# Patient Record
Sex: MALE | Race: WHITE | Employment: OTHER | URBAN - METROPOLITAN AREA
[De-identification: names, ages, dates, MRNs, and addresses within clinical notes are randomized per-mention and may not be internally consistent; named-entity substitution may affect disease eponyms.]

---

## 2023-05-19 LAB
ALANINE AMINOTRANSFERASE (SGPT) (U/L) IN SER/PLAS: 25 U/L (ref 10–52)
ALBUMIN (G/DL) IN SER/PLAS: 4 G/DL (ref 3.4–5)
ALKALINE PHOSPHATASE (U/L) IN SER/PLAS: 71 U/L (ref 33–136)
ASPARTATE AMINOTRANSFERASE (SGOT) (U/L) IN SER/PLAS: 26 U/L (ref 9–39)
BASOPHILS (10*3/UL) IN BLOOD BY AUTOMATED COUNT: 0.05 X10E9/L (ref 0–0.1)
BASOPHILS/100 LEUKOCYTES IN BLOOD BY AUTOMATED COUNT: 0.8 % (ref 0–2)
BILIRUBIN DIRECT (MG/DL) IN SER/PLAS: 0.1 MG/DL (ref 0–0.3)
BILIRUBIN TOTAL (MG/DL) IN SER/PLAS: 0.5 MG/DL (ref 0–1.2)
C REACTIVE PROTEIN (MG/L) IN SER/PLAS: 0.16 MG/DL
CREATININE (MG/DL) IN SER/PLAS: 1.04 MG/DL (ref 0.5–1.3)
EOSINOPHILS (10*3/UL) IN BLOOD BY AUTOMATED COUNT: 0.2 X10E9/L (ref 0–0.4)
EOSINOPHILS/100 LEUKOCYTES IN BLOOD BY AUTOMATED COUNT: 3.3 % (ref 0–6)
ERYTHROCYTE DISTRIBUTION WIDTH (RATIO) BY AUTOMATED COUNT: 15.1 % (ref 11.5–14.5)
ERYTHROCYTE MEAN CORPUSCULAR HEMOGLOBIN CONCENTRATION (G/DL) BY AUTOMATED: 31.6 G/DL (ref 32–36)
ERYTHROCYTE MEAN CORPUSCULAR VOLUME (FL) BY AUTOMATED COUNT: 100 FL (ref 80–100)
ERYTHROCYTES (10*6/UL) IN BLOOD BY AUTOMATED COUNT: 3.93 X10E12/L (ref 4.5–5.9)
GFR MALE: 74 ML/MIN/1.73M2
HEMATOCRIT (%) IN BLOOD BY AUTOMATED COUNT: 39.2 % (ref 41–52)
HEMOGLOBIN (G/DL) IN BLOOD: 12.4 G/DL (ref 13.5–17.5)
IMMATURE GRANULOCYTES/100 LEUKOCYTES IN BLOOD BY AUTOMATED COUNT: 0.3 % (ref 0–0.9)
LEUKOCYTES (10*3/UL) IN BLOOD BY AUTOMATED COUNT: 6.1 X10E9/L (ref 4.4–11.3)
LYMPHOCYTES (10*3/UL) IN BLOOD BY AUTOMATED COUNT: 1.15 X10E9/L (ref 0.8–3)
LYMPHOCYTES/100 LEUKOCYTES IN BLOOD BY AUTOMATED COUNT: 18.9 % (ref 13–44)
MONOCYTES (10*3/UL) IN BLOOD BY AUTOMATED COUNT: 0.47 X10E9/L (ref 0.05–0.8)
MONOCYTES/100 LEUKOCYTES IN BLOOD BY AUTOMATED COUNT: 7.7 % (ref 2–10)
NEUTROPHILS (10*3/UL) IN BLOOD BY AUTOMATED COUNT: 4.21 X10E9/L (ref 1.6–5.5)
NEUTROPHILS/100 LEUKOCYTES IN BLOOD BY AUTOMATED COUNT: 69 % (ref 40–80)
NRBC (PER 100 WBCS) BY AUTOMATED COUNT: 0 /100 WBC (ref 0–0)
PLATELETS (10*3/UL) IN BLOOD AUTOMATED COUNT: 159 X10E9/L (ref 150–450)
PROTEIN TOTAL: 6.6 G/DL (ref 6.4–8.2)

## 2023-08-18 LAB
ALANINE AMINOTRANSFERASE (SGPT) (U/L) IN SER/PLAS: 34 U/L (ref 10–52)
ALBUMIN (G/DL) IN SER/PLAS: 4.1 G/DL (ref 3.4–5)
ALKALINE PHOSPHATASE (U/L) IN SER/PLAS: 91 U/L (ref 33–136)
ASPARTATE AMINOTRANSFERASE (SGOT) (U/L) IN SER/PLAS: 43 U/L (ref 9–39)
BASOPHILS (10*3/UL) IN BLOOD BY AUTOMATED COUNT: 0.06 X10E9/L (ref 0–0.1)
BASOPHILS/100 LEUKOCYTES IN BLOOD BY AUTOMATED COUNT: 0.9 % (ref 0–2)
BILIRUBIN DIRECT (MG/DL) IN SER/PLAS: 0.1 MG/DL (ref 0–0.3)
BILIRUBIN TOTAL (MG/DL) IN SER/PLAS: 0.5 MG/DL (ref 0–1.2)
C REACTIVE PROTEIN (MG/L) IN SER/PLAS: 0.54 MG/DL
CREATININE (MG/DL) IN SER/PLAS: 1.06 MG/DL (ref 0.5–1.3)
EOSINOPHILS (10*3/UL) IN BLOOD BY AUTOMATED COUNT: 0.32 X10E9/L (ref 0–0.4)
EOSINOPHILS/100 LEUKOCYTES IN BLOOD BY AUTOMATED COUNT: 5 % (ref 0–6)
ERYTHROCYTE DISTRIBUTION WIDTH (RATIO) BY AUTOMATED COUNT: 14.6 % (ref 11.5–14.5)
ERYTHROCYTE MEAN CORPUSCULAR HEMOGLOBIN CONCENTRATION (G/DL) BY AUTOMATED: 32.8 G/DL (ref 32–36)
ERYTHROCYTE MEAN CORPUSCULAR VOLUME (FL) BY AUTOMATED COUNT: 101 FL (ref 80–100)
ERYTHROCYTES (10*6/UL) IN BLOOD BY AUTOMATED COUNT: 3.93 X10E12/L (ref 4.5–5.9)
GFR MALE: 72 ML/MIN/1.73M2
HEMATOCRIT (%) IN BLOOD BY AUTOMATED COUNT: 39.6 % (ref 41–52)
HEMOGLOBIN (G/DL) IN BLOOD: 13 G/DL (ref 13.5–17.5)
IMMATURE GRANULOCYTES/100 LEUKOCYTES IN BLOOD BY AUTOMATED COUNT: 0.3 % (ref 0–0.9)
LEUKOCYTES (10*3/UL) IN BLOOD BY AUTOMATED COUNT: 6.4 X10E9/L (ref 4.4–11.3)
LYMPHOCYTES (10*3/UL) IN BLOOD BY AUTOMATED COUNT: 1.18 X10E9/L (ref 0.8–3)
LYMPHOCYTES/100 LEUKOCYTES IN BLOOD BY AUTOMATED COUNT: 18.4 % (ref 13–44)
MONOCYTES (10*3/UL) IN BLOOD BY AUTOMATED COUNT: 0.77 X10E9/L (ref 0.05–0.8)
MONOCYTES/100 LEUKOCYTES IN BLOOD BY AUTOMATED COUNT: 12 % (ref 2–10)
NEUTROPHILS (10*3/UL) IN BLOOD BY AUTOMATED COUNT: 4.06 X10E9/L (ref 1.6–5.5)
NEUTROPHILS/100 LEUKOCYTES IN BLOOD BY AUTOMATED COUNT: 63.4 % (ref 40–80)
NRBC (PER 100 WBCS) BY AUTOMATED COUNT: 0 /100 WBC (ref 0–0)
PLATELETS (10*3/UL) IN BLOOD AUTOMATED COUNT: 146 X10E9/L (ref 150–450)
PROTEIN TOTAL: 6.5 G/DL (ref 6.4–8.2)

## 2023-08-31 ENCOUNTER — HOSPITAL ENCOUNTER (OUTPATIENT)
Dept: DATA CONVERSION | Facility: HOSPITAL | Age: 78
Discharge: HOME | End: 2023-08-31
Payer: MEDICARE

## 2023-08-31 DIAGNOSIS — E75.5 OTHER LIPID STORAGE DISORDERS: ICD-10-CM

## 2023-08-31 DIAGNOSIS — I10 ESSENTIAL (PRIMARY) HYPERTENSION: ICD-10-CM

## 2023-08-31 DIAGNOSIS — R73.09 OTHER ABNORMAL GLUCOSE: ICD-10-CM

## 2023-08-31 LAB
ALBUMIN SERPL-MCNC: 4 GM/DL (ref 3.5–5)
ALBUMIN/GLOB SERPL: 1.8 RATIO (ref 1.5–3)
ALP BLD-CCNC: 101 U/L (ref 35–125)
ALT SERPL-CCNC: 35 U/L (ref 5–40)
ANION GAP SERPL CALCULATED.3IONS-SCNC: 12 MMOL/L (ref 0–19)
AST SERPL-CCNC: 61 U/L (ref 5–40)
BASOPHILS # BLD AUTO: 0.06 K/UL (ref 0–0.22)
BASOPHILS NFR BLD AUTO: 1.1 % (ref 0–1)
BILIRUB SERPL-MCNC: 0.3 MG/DL (ref 0.1–1.2)
BUN SERPL-MCNC: 14 MG/DL (ref 8–25)
BUN/CREAT SERPL: 12.7 RATIO (ref 8–21)
CALCIUM SERPL-MCNC: 8.8 MG/DL (ref 8.5–10.4)
CHLORIDE SERPL-SCNC: 106 MMOL/L (ref 97–107)
CO2 SERPL-SCNC: 23 MMOL/L (ref 24–31)
CREAT SERPL-MCNC: 1.1 MG/DL (ref 0.4–1.6)
DEPRECATED RDW RBC AUTO: 52.4 FL (ref 37–54)
DIFFERENTIAL METHOD BLD: ABNORMAL
EOSINOPHIL # BLD AUTO: 0.67 K/UL (ref 0–0.45)
EOSINOPHIL NFR BLD: 12.2 % (ref 0–3)
ERYTHROCYTE [DISTWIDTH] IN BLOOD BY AUTOMATED COUNT: 14.5 % (ref 11.7–15)
GFR SERPL CREATININE-BSD FRML MDRD: 69 ML/MIN/1.73 M2
GLOBULIN SER-MCNC: 2.2 G/DL (ref 1.9–3.7)
GLUCOSE SERPL-MCNC: 106 MG/DL (ref 65–99)
HCT VFR BLD AUTO: 38.7 % (ref 41–50)
HGB BLD-MCNC: 12.6 GM/DL (ref 13.5–16.5)
IMM GRANULOCYTES # BLD AUTO: 0.02 K/UL (ref 0–0.1)
LYMPHOCYTES # BLD AUTO: 1.37 K/UL (ref 1.2–3.2)
LYMPHOCYTES NFR BLD MANUAL: 24.9 % (ref 20–40)
MCH RBC QN AUTO: 32.1 PG (ref 26–34)
MCHC RBC AUTO-ENTMCNC: 32.6 % (ref 31–37)
MCV RBC AUTO: 98.5 FL (ref 80–100)
MONOCYTES # BLD AUTO: 0.69 K/UL (ref 0–0.8)
MONOCYTES NFR BLD MANUAL: 12.5 % (ref 0–8)
NEUTROPHILS # BLD AUTO: 2.69 K/UL
NEUTROPHILS # BLD AUTO: 2.69 K/UL (ref 1.8–7.7)
NEUTROPHILS.IMMATURE NFR BLD: 0.4 % (ref 0–1)
NEUTS SEG NFR BLD: 48.9 % (ref 50–70)
NRBC BLD-RTO: 0 /100 WBC
PLATELET # BLD AUTO: 146 K/UL (ref 150–450)
PMV BLD AUTO: 9.9 CU (ref 7–12.6)
POTASSIUM SERPL-SCNC: 4.7 MMOL/L (ref 3.4–5.1)
PROT SERPL-MCNC: 6.2 G/DL (ref 5.9–7.9)
RBC # BLD AUTO: 3.93 M/UL (ref 4.5–5.5)
SODIUM SERPL-SCNC: 141 MMOL/L (ref 133–145)
WBC # BLD AUTO: 5.5 K/UL (ref 4.5–11)

## 2023-09-15 PROBLEM — I10 HYPERTENSION: Status: ACTIVE | Noted: 2023-09-15

## 2023-09-15 PROBLEM — K52.9 COLITIS: Status: ACTIVE | Noted: 2023-09-15

## 2023-09-15 PROBLEM — R21 RASH: Status: ACTIVE | Noted: 2023-09-15

## 2023-09-15 PROBLEM — R73.03 PREDIABETES: Status: ACTIVE | Noted: 2023-09-15

## 2023-09-15 PROBLEM — R79.9 ABNORMAL BLOOD CHEMISTRY: Status: ACTIVE | Noted: 2023-09-15

## 2023-09-15 PROBLEM — Q76.6 ABNORMALITY OF RIB DETERMINED BY X-RAY: Status: ACTIVE | Noted: 2023-09-15

## 2023-09-15 PROBLEM — R04.0 EPISTAXIS: Status: ACTIVE | Noted: 2023-09-15

## 2023-09-15 PROBLEM — N52.9 ERECTILE DYSFUNCTION: Status: ACTIVE | Noted: 2023-09-15

## 2023-09-15 PROBLEM — K21.9 GASTROESOPHAGEAL REFLUX DISEASE: Status: ACTIVE | Noted: 2023-09-15

## 2023-09-15 PROBLEM — M19.90 OSTEOARTHRITIS: Status: ACTIVE | Noted: 2023-09-15

## 2023-09-15 PROBLEM — E78.5 HYPERLIPIDEMIA: Status: ACTIVE | Noted: 2023-09-15

## 2023-09-15 PROBLEM — M06.9 RHEUMATOID ARTHRITIS (MULTI): Status: ACTIVE | Noted: 2023-09-15

## 2023-09-15 PROBLEM — M13.80 SERONEGATIVE ARTHRITIS: Status: ACTIVE | Noted: 2023-09-15

## 2023-09-15 PROBLEM — M81.0 OSTEOPOROSIS: Status: ACTIVE | Noted: 2023-09-15

## 2023-09-15 PROBLEM — K13.0 LIP ULCERATION: Status: ACTIVE | Noted: 2023-09-15

## 2023-09-15 RX ORDER — PREDNISONE 1 MG/1
TABLET ORAL
COMMUNITY
Start: 2023-05-19 | End: 2023-10-23 | Stop reason: ALTCHOICE

## 2023-09-15 RX ORDER — OMEPRAZOLE 10 MG/1
1 CAPSULE, DELAYED RELEASE ORAL DAILY
COMMUNITY
End: 2023-10-23 | Stop reason: ALTCHOICE

## 2023-09-15 RX ORDER — PREDNISONE 5 MG/1
1 TABLET ORAL DAILY
COMMUNITY
Start: 2022-09-19 | End: 2023-10-23 | Stop reason: ALTCHOICE

## 2023-09-15 RX ORDER — CHOLECALCIFEROL (VITAMIN D3) 125 MCG
1 CAPSULE ORAL DAILY
COMMUNITY
Start: 2019-10-07

## 2023-09-15 RX ORDER — UBIDECARENONE 30 MG
1 CAPSULE ORAL DAILY
COMMUNITY
End: 2024-04-22 | Stop reason: WASHOUT

## 2023-09-15 RX ORDER — ACETAMINOPHEN, ASPIRIN (NSAID), AND CAFFEINE 250; 250; 65 MG/1; MG/1; MG/1
2 TABLET, FILM COATED ORAL DAILY
COMMUNITY
End: 2024-05-30 | Stop reason: HOSPADM

## 2023-09-15 RX ORDER — LEFLUNOMIDE 20 MG/1
1 TABLET ORAL DAILY
COMMUNITY
End: 2024-04-02 | Stop reason: SDUPTHER

## 2023-09-15 RX ORDER — ACETAMINOPHEN 500 MG
1 TABLET ORAL DAILY
COMMUNITY
End: 2023-10-23 | Stop reason: ALTCHOICE

## 2023-09-15 RX ORDER — PREDNISONE 10 MG/1
10 TABLET ORAL EVERY MORNING
COMMUNITY
Start: 2023-04-10 | End: 2023-10-23 | Stop reason: ALTCHOICE

## 2023-09-15 RX ORDER — NABUMETONE 750 MG/1
1 TABLET, FILM COATED ORAL 2 TIMES DAILY
COMMUNITY
End: 2023-10-23 | Stop reason: ALTCHOICE

## 2023-09-15 RX ORDER — OMEPRAZOLE 20 MG/1
1 CAPSULE, DELAYED RELEASE ORAL DAILY
COMMUNITY
Start: 2019-10-07 | End: 2024-05-30 | Stop reason: HOSPADM

## 2023-09-15 RX ORDER — IBANDRONATE SODIUM 150 MG/1
1 TABLET, FILM COATED ORAL
COMMUNITY
End: 2023-12-04 | Stop reason: SDUPTHER

## 2023-09-15 RX ORDER — LISINOPRIL 10 MG/1
1 TABLET ORAL EVERY MORNING
COMMUNITY
Start: 2019-10-07 | End: 2023-10-10

## 2023-09-15 RX ORDER — LANOLIN ALCOHOL/MO/W.PET/CERES
1 CREAM (GRAM) TOPICAL DAILY
COMMUNITY
Start: 2019-10-07 | End: 2023-12-04 | Stop reason: WASHOUT

## 2023-10-09 DIAGNOSIS — I10 PRIMARY HYPERTENSION: Primary | ICD-10-CM

## 2023-10-10 RX ORDER — LISINOPRIL 10 MG/1
10 TABLET ORAL DAILY
Qty: 90 TABLET | Refills: 1 | Status: SHIPPED | OUTPATIENT
Start: 2023-10-10 | End: 2024-04-04

## 2023-10-23 ENCOUNTER — OFFICE VISIT (OUTPATIENT)
Dept: OTOLARYNGOLOGY | Facility: CLINIC | Age: 78
End: 2023-10-23
Payer: MEDICARE

## 2023-10-23 VITALS — WEIGHT: 166 LBS | BODY MASS INDEX: 24.59 KG/M2 | TEMPERATURE: 97.5 F | HEIGHT: 69 IN

## 2023-10-23 DIAGNOSIS — J34.3 HYPERTROPHY OF INFERIOR NASAL TURBINATE: ICD-10-CM

## 2023-10-23 DIAGNOSIS — J31.0 CHRONIC RHINITIS: Primary | ICD-10-CM

## 2023-10-23 DIAGNOSIS — J34.2 DEVIATED NASAL SEPTUM: ICD-10-CM

## 2023-10-23 PROCEDURE — 1160F RVW MEDS BY RX/DR IN RCRD: CPT | Performed by: OTOLARYNGOLOGY

## 2023-10-23 PROCEDURE — 99213 OFFICE O/P EST LOW 20 MIN: CPT | Performed by: OTOLARYNGOLOGY

## 2023-10-23 PROCEDURE — 1036F TOBACCO NON-USER: CPT | Performed by: OTOLARYNGOLOGY

## 2023-10-23 PROCEDURE — 1159F MED LIST DOCD IN RCRD: CPT | Performed by: OTOLARYNGOLOGY

## 2023-10-23 RX ORDER — AZELASTINE HCL 205.5 UG/1
SPRAY NASAL
Qty: 30 ML | Refills: 11 | Status: SHIPPED | OUTPATIENT
Start: 2023-10-23 | End: 2023-12-04 | Stop reason: WASHOUT

## 2023-10-23 RX ORDER — FLUTICASONE PROPIONATE 50 MCG
SPRAY, SUSPENSION (ML) NASAL
Qty: 16 G | Refills: 11 | Status: SHIPPED | OUTPATIENT
Start: 2023-10-23 | End: 2023-12-04 | Stop reason: WASHOUT

## 2023-10-23 NOTE — PROGRESS NOTES
HPI  Bret Rowland is a 78 y.o. male with long history of bilateral nasal obstruction, fluctuates.  Often better with Afrin but he uses very rarely.  He was told that he would benefit from septoplasty years ago but never pursued.  He is not on any prescription nasal spray.  Does not do much noise nasal infection.      Past Medical History:   Diagnosis Date    Personal history of other diseases of the digestive system     History of gastroesophageal reflux (GERD)    Personal history of other diseases of the musculoskeletal system and connective tissue     History of arthritis            Medications:     Current Outpatient Medications:     aspirin-acetaminophen-caffeine (Excedrin Extra Strength) 250-250-65 mg tablet, Take 2 tablets by mouth once daily., Disp: , Rfl:     cholecalciferol (Vitamin D-3) 125 MCG (5000 UT) capsule, Take 1 capsule (5,000 Units) by mouth once daily., Disp: , Rfl:     ibandronate (Boniva) 150 mg tablet, Take 1 tablet (150 mg) by mouth every 30 (thirty) days., Disp: , Rfl:     leflunomide (Arava) 20 mg tablet, Take 1 tablet (20 mg) by mouth once daily., Disp: , Rfl:     lisinopril 10 mg tablet, Take 1 tablet by mouth once daily, Disp: 90 tablet, Rfl: 1    magnesium oxide (Mag-Ox) 400 mg (241.3 mg magnesium) tablet, Take 1 tablet (400 mg) by mouth once daily., Disp: , Rfl:     multivit-min-FA-lycopen-lutein (ESSENTIAL Man) 0.4-2-250 mg-mg-mcg tablet, Take 1 tablet by mouth once daily., Disp: , Rfl:     NON FORMULARY, Prevagen CAPS  Refills: 0     Active, Disp: , Rfl:     omeprazole (PriLOSEC) 20 mg DR capsule, Take 1 capsule (20 mg) by mouth once daily. At the same time, Disp: , Rfl:      Allergies:  Allergies   Allergen Reactions    Amoxicillin-Pot Clavulanate Unknown    Erythromycin Unknown    Hydroxychloroquine Hives    Levofloxacin Hives    Nsaids (Non-Steroidal Anti-Inflammatory Drug) Unknown    Sulfa (Sulfonamide Antibiotics) Unknown        Physical Exam:  Last Recorded  "Vitals  Temperature 36.4 °C (97.5 °F), height 1.753 m (5' 9\"), weight 75.3 kg (166 lb).  General:     General appearance: Well-developed, well-nourished in no acute distress.       Voice:  normal       Head/face: Normal appearance; nontender to palpation     Facial nerve function: Normal and symmetric bilaterally.    Oral/oropharynx:     Oral vestibule: Normal labial and gingival mucosa     Tongue/floor of mouth: Normal without lesion     Oropharynx: Clear.  No lesions present of the hard/soft palate, posterior pharynx    Neck:     Neck: Normal appearance, trachea midline     Salivary glands: Normal to palpation bilaterally     Lymph nodes: No cervical lymphadenopathy to palpation     Thyroid: No thyromegaly.  No palpable nodules     Range of motion: Normal    Neurological:     Cortical functions: Alert and oriented x3, appropriate affect       Larynx/hypopharynx:     Laryngeal findings: Mirror exam inadequate or limited secondary to enlarged base of tongue and/or excessive gagging    Ear:     Ear canal: Normal bilaterally     Tympanic membrane: Intact and mobile bilaterally     Pinna: Normal bilaterally     Hearing:  Gross hearing assessment normal by voice    Nose:     Visualized using: Anterior rhinoscopy     Nasopharynx: Inadequate mirror exam secondary to gag, anatomy.       Nasal dorsum: Nontraumatic midline appearance     Septum: Deviation     inferior turbinates: Congestion bilaterally     Mucosa: Bilateral, pink, normal appearing       Assessment/Plan   He has obvious nasal septal deviation and bilateral inferior turbinate hypertrophy.  Recommend azelastine and fluticasone.  Follow-up in a month.  We discussed consideration of procedures including office-based turbinate surgeries which may be of some benefit here.  We will see how he responds to the prescribed therapy         Justino Dykes MD    "

## 2023-11-17 ENCOUNTER — APPOINTMENT (OUTPATIENT)
Dept: RHEUMATOLOGY | Facility: CLINIC | Age: 78
End: 2023-11-17
Payer: MEDICARE

## 2023-11-20 ENCOUNTER — OFFICE VISIT (OUTPATIENT)
Dept: OTOLARYNGOLOGY | Facility: CLINIC | Age: 78
End: 2023-11-20
Payer: MEDICARE

## 2023-11-20 VITALS — HEIGHT: 69 IN | BODY MASS INDEX: 24.44 KG/M2 | WEIGHT: 165 LBS

## 2023-11-20 DIAGNOSIS — J34.3 HYPERTROPHY OF INFERIOR NASAL TURBINATE: ICD-10-CM

## 2023-11-20 DIAGNOSIS — J34.2 DEVIATED NASAL SEPTUM: Primary | ICD-10-CM

## 2023-11-20 PROCEDURE — 1159F MED LIST DOCD IN RCRD: CPT | Performed by: OTOLARYNGOLOGY

## 2023-11-20 PROCEDURE — 1160F RVW MEDS BY RX/DR IN RCRD: CPT | Performed by: OTOLARYNGOLOGY

## 2023-11-20 PROCEDURE — 99213 OFFICE O/P EST LOW 20 MIN: CPT | Performed by: OTOLARYNGOLOGY

## 2023-11-20 PROCEDURE — 1036F TOBACCO NON-USER: CPT | Performed by: OTOLARYNGOLOGY

## 2023-11-20 NOTE — PROGRESS NOTES
HPI  Bret Rowland is a 78 y.o. male follow-up trial of nasal sprays.  He did not get much relief from his nasal congestion with the nasal sprays.  He continues to be bothered especially at night.      Prior history: long history of bilateral nasal obstruction, fluctuates.  Often better with Afrin but he uses very rarely.  He was told that he would benefit from septoplasty years ago but never pursued.  He is not on any prescription nasal spray.  Does not do much noise nasal infection.      Past Medical History:   Diagnosis Date    Personal history of other diseases of the digestive system     History of gastroesophageal reflux (GERD)    Personal history of other diseases of the musculoskeletal system and connective tissue     History of arthritis            Medications:     Current Outpatient Medications:     aspirin-acetaminophen-caffeine (Excedrin Extra Strength) 250-250-65 mg tablet, Take 2 tablets by mouth once daily., Disp: , Rfl:     azelastine 205.5 mcg (0.15 %) spray,non-aerosol, Administer 2 sprays to each nostril twice daily, Disp: 30 mL, Rfl: 11    cholecalciferol (Vitamin D-3) 125 MCG (5000 UT) capsule, Take 1 capsule (125 mcg) by mouth once daily., Disp: , Rfl:     fluticasone (Flonase) 50 mcg/actuation nasal spray, Administer 2 pumps to each nostril once daily, Disp: 16 g, Rfl: 11    ibandronate (Boniva) 150 mg tablet, Take 1 tablet (150 mg) by mouth every 30 (thirty) days., Disp: , Rfl:     leflunomide (Arava) 20 mg tablet, Take 1 tablet (20 mg) by mouth once daily., Disp: , Rfl:     lisinopril 10 mg tablet, Take 1 tablet by mouth once daily, Disp: 90 tablet, Rfl: 1    magnesium oxide (Mag-Ox) 400 mg (241.3 mg magnesium) tablet, Take 1 tablet (400 mg) by mouth once daily., Disp: , Rfl:     multivit-min-FA-lycopen-lutein (ESSENTIAL Man) 0.4-2-250 mg-mg-mcg tablet, Take 1 tablet by mouth once daily., Disp: , Rfl:     NON FORMULARY, Prevagen CAPS  Refills: 0     Active, Disp: , Rfl:     omeprazole  (PriLOSEC) 20 mg DR capsule, Take 1 capsule (20 mg) by mouth once daily. At the same time, Disp: , Rfl:      Allergies:  Allergies   Allergen Reactions    Amoxicillin-Pot Clavulanate Unknown    Erythromycin Unknown    Hydroxychloroquine Hives    Levofloxacin Hives    Nsaids (Non-Steroidal Anti-Inflammatory Drug) Unknown    Sulfa (Sulfonamide Antibiotics) Unknown        Physical Exam:  Last Recorded Vitals  There were no vitals taken for this visit.  General:     General appearance: Well-developed, well-nourished in no acute distress.       Voice:  normal       Head/face: Normal appearance; nontender to palpation     Facial nerve function: Normal and symmetric bilaterally.    Oral/oropharynx:     Oral vestibule: Normal labial and gingival mucosa     Tongue/floor of mouth: Normal without lesion     Oropharynx: Clear.  No lesions present of the hard/soft palate, posterior pharynx    Neck:     Neck: Normal appearance, trachea midline     Salivary glands: Normal to palpation bilaterally     Lymph nodes: No cervical lymphadenopathy to palpation     Thyroid: No thyromegaly.  No palpable nodules     Range of motion: Normal    Neurological:     Cortical functions: Alert and oriented x3, appropriate affect       Larynx/hypopharynx:     Laryngeal findings: Mirror exam inadequate or limited secondary to enlarged base of tongue and/or excessive gagging    Ear:     Ear canal: Normal bilaterally     Tympanic membrane: Intact and mobile bilaterally     Pinna: Normal bilaterally     Hearing:  Gross hearing assessment normal by voice    Nose:     Visualized using: Anterior rhinoscopy     Nasopharynx: Inadequate mirror exam secondary to gag, anatomy.       Nasal dorsum: Nontraumatic midline appearance     Septum: Deviation     inferior turbinates: Congestion bilaterally     Mucosa: Bilateral, pink, normal appearing       Assessment/Plan   He has obvious nasal septal deviation and bilateral inferior turbinate hypertrophy.  He has not  responded well to medical therapy.  We discussed procedural options including office-based turbinate hypertrophy versus septoplasty and bilateral inferior turbinate reduction under general anesthesia.  He is very vital and I think he would get more benefit from addressing the considerable nasal septal deviation and I have recommended septoplasty and bilateral inferior turbinate reduction and outfracture.  The risks, goals, and alternatives were discussed in detail.  Specifically, the risks including, but not limited to, general anesthesia, bleeding, infection, nasal septal perforation, numbness, alteration of sense of smell, potential need for revision surgery or inadequate resolution of symptoms and CSF leak were discussed in detail.  The patient has indicated informed consent would like to schedule.         Justino Dykes MD

## 2023-12-04 ENCOUNTER — LAB (OUTPATIENT)
Dept: LAB | Facility: LAB | Age: 78
End: 2023-12-04
Payer: MEDICARE

## 2023-12-04 ENCOUNTER — OFFICE VISIT (OUTPATIENT)
Dept: RHEUMATOLOGY | Facility: CLINIC | Age: 78
End: 2023-12-04
Payer: MEDICARE

## 2023-12-04 ENCOUNTER — APPOINTMENT (OUTPATIENT)
Dept: RHEUMATOLOGY | Facility: CLINIC | Age: 78
End: 2023-12-04
Payer: MEDICARE

## 2023-12-04 VITALS
DIASTOLIC BLOOD PRESSURE: 64 MMHG | HEIGHT: 69 IN | BODY MASS INDEX: 24.73 KG/M2 | SYSTOLIC BLOOD PRESSURE: 133 MMHG | WEIGHT: 167 LBS | HEART RATE: 65 BPM

## 2023-12-04 DIAGNOSIS — Z79.899 ENCOUNTER FOR LONG-TERM (CURRENT) USE OF MEDICATIONS: ICD-10-CM

## 2023-12-04 DIAGNOSIS — M81.0 OSTEOPOROSIS, UNSPECIFIED OSTEOPOROSIS TYPE, UNSPECIFIED PATHOLOGICAL FRACTURE PRESENCE: ICD-10-CM

## 2023-12-04 DIAGNOSIS — M13.80 SERONEGATIVE ARTHRITIS: Primary | ICD-10-CM

## 2023-12-04 DIAGNOSIS — M13.80 SERONEGATIVE ARTHRITIS: ICD-10-CM

## 2023-12-04 LAB
25(OH)D3 SERPL-MCNC: 84 NG/ML (ref 30–100)
ALBUMIN SERPL BCP-MCNC: 3.9 G/DL (ref 3.4–5)
ALP SERPL-CCNC: 67 U/L (ref 33–136)
ALT SERPL W P-5'-P-CCNC: 30 U/L (ref 10–52)
ANION GAP SERPL CALC-SCNC: 13 MMOL/L (ref 10–20)
AST SERPL W P-5'-P-CCNC: 32 U/L (ref 9–39)
BILIRUB SERPL-MCNC: 0.5 MG/DL (ref 0–1.2)
BUN SERPL-MCNC: 16 MG/DL (ref 6–23)
CALCIUM SERPL-MCNC: 8.3 MG/DL (ref 8.6–10.3)
CCP IGG SERPL-ACNC: <1 U/ML
CHLORIDE SERPL-SCNC: 108 MMOL/L (ref 98–107)
CO2 SERPL-SCNC: 26 MMOL/L (ref 21–32)
CREAT SERPL-MCNC: 0.94 MG/DL (ref 0.5–1.3)
CRP SERPL-MCNC: 0.31 MG/DL
ERYTHROCYTE [DISTWIDTH] IN BLOOD BY AUTOMATED COUNT: 16.6 % (ref 11.5–14.5)
ERYTHROCYTE [SEDIMENTATION RATE] IN BLOOD BY WESTERGREN METHOD: 4 MM/H (ref 0–20)
GFR SERPL CREATININE-BSD FRML MDRD: 83 ML/MIN/1.73M*2
GLUCOSE SERPL-MCNC: 89 MG/DL (ref 74–99)
HCT VFR BLD AUTO: 39.1 % (ref 41–52)
HGB BLD-MCNC: 12.2 G/DL (ref 13.5–17.5)
MCH RBC QN AUTO: 31.4 PG (ref 26–34)
MCHC RBC AUTO-ENTMCNC: 31.2 G/DL (ref 32–36)
MCV RBC AUTO: 101 FL (ref 80–100)
NRBC BLD-RTO: 0 /100 WBCS (ref 0–0)
PLATELET # BLD AUTO: 124 X10*3/UL (ref 150–450)
POTASSIUM SERPL-SCNC: 3.9 MMOL/L (ref 3.5–5.3)
PROT SERPL-MCNC: 5.9 G/DL (ref 6.4–8.2)
RBC # BLD AUTO: 3.88 X10*6/UL (ref 4.5–5.9)
RHEUMATOID FACT SER NEPH-ACNC: <10 IU/ML (ref 0–15)
SODIUM SERPL-SCNC: 143 MMOL/L (ref 136–145)
WBC # BLD AUTO: 6.2 X10*3/UL (ref 4.4–11.3)

## 2023-12-04 PROCEDURE — 86140 C-REACTIVE PROTEIN: CPT

## 2023-12-04 PROCEDURE — 1159F MED LIST DOCD IN RCRD: CPT | Performed by: INTERNAL MEDICINE

## 2023-12-04 PROCEDURE — 1036F TOBACCO NON-USER: CPT | Performed by: INTERNAL MEDICINE

## 2023-12-04 PROCEDURE — 3075F SYST BP GE 130 - 139MM HG: CPT | Performed by: INTERNAL MEDICINE

## 2023-12-04 PROCEDURE — 80053 COMPREHEN METABOLIC PANEL: CPT

## 2023-12-04 PROCEDURE — 82306 VITAMIN D 25 HYDROXY: CPT

## 2023-12-04 PROCEDURE — 99214 OFFICE O/P EST MOD 30 MIN: CPT | Performed by: INTERNAL MEDICINE

## 2023-12-04 PROCEDURE — 85027 COMPLETE CBC AUTOMATED: CPT

## 2023-12-04 PROCEDURE — 86431 RHEUMATOID FACTOR QUANT: CPT

## 2023-12-04 PROCEDURE — 1160F RVW MEDS BY RX/DR IN RCRD: CPT | Performed by: INTERNAL MEDICINE

## 2023-12-04 PROCEDURE — 3078F DIAST BP <80 MM HG: CPT | Performed by: INTERNAL MEDICINE

## 2023-12-04 PROCEDURE — 85652 RBC SED RATE AUTOMATED: CPT

## 2023-12-04 PROCEDURE — 36415 COLL VENOUS BLD VENIPUNCTURE: CPT

## 2023-12-04 PROCEDURE — 86200 CCP ANTIBODY: CPT

## 2023-12-04 RX ORDER — PREDNISONE 5 MG/1
5 TABLET ORAL EVERY OTHER DAY
Qty: 45 TABLET | Refills: 3 | Status: SHIPPED | OUTPATIENT
Start: 2023-12-04 | End: 2024-01-29 | Stop reason: SDUPTHER

## 2023-12-04 RX ORDER — IBANDRONATE SODIUM 150 MG/1
150 TABLET, FILM COATED ORAL
Qty: 3 TABLET | Refills: 3 | Status: SHIPPED | OUTPATIENT
Start: 2023-12-04 | End: 2024-04-22 | Stop reason: SDUPTHER

## 2023-12-04 RX ORDER — PREDNISONE 5 MG/1
5 TABLET ORAL DAILY
COMMUNITY
End: 2023-12-04 | Stop reason: SDUPTHER

## 2023-12-04 ASSESSMENT — ENCOUNTER SYMPTOMS
BRUISES/BLEEDS EASILY: 1
SLEEP DISTURBANCE: 1
FATIGUE: 1
SHORTNESS OF BREATH: 1

## 2023-12-04 NOTE — PROGRESS NOTES
"Subjective   Patient ID: Bret Rowland is a 78 y.o. male who presents for Rheumatoid Arthritis (New Patient~Previously saw Dr. Avila).  HPI  Patient with history of seronegative rheumatoid arthritis, osteoarthritis, and osteoporosis previously seen by Dr. Avila and Dr. Louis.  Previous medications have included Plaquenil    When he initially had his symptoms 5 years ago, he had symptoms in his shoulders that he could not lift his shoulders.  Also had symptoms in his hips.  He had seen provider and eventually Dr. Louis and was diagnosed with seronegative rheumatoid.  He says that they have never heard of polymyalgia rheumatica.  He never really had swollen joints.  Has been on ibandronate and has been compliant with medication.  Denies any heartburn issues with it.    He did have rib fractures in 1984 after falling down concrete steps.    When he had last seen Dr. Avila in August discussed about tapering off prednisone and he was going down 1 mg every 2 weeks.  He was off prednisone altogether for about 1 week and had such achiness, stiffness in his hips and shoulders that he got back on again.  He was on 10 mg for quite some time.  Has a lot of soreness.  The last time he took prednisone was about 2 days ago.    He is feeling a bit achy now.    He had been taking more Excedrin recentlyReview of Systems   Constitutional:  Positive for fatigue.   Eyes:         Wears glasses   Respiratory:  Positive for shortness of breath.         Previous smoker quit 35 years ago   Cardiovascular:  Negative for chest pain.        Hypertension, had full cardiac workup for his shortness of breath on exertion with cardiology and was negative.   Gastrointestinal:         Had  h/o microscopic colitis.  Now controlling it with diet.  Previously had been on budesonide and colestipol.   Musculoskeletal:         Per HPI   Hematological:  Bruises/bleeds easily.   Psychiatric/Behavioral:  Positive for sleep disturbance (\"can't shurt " "down.\").        Objective   Physical Exam  GEN: NAD A&O x3 appropriate affect able to get onto the exam table  EYES:  no conjunctival redness, eyelids normal, wears glasses  SKIN: no rashes, ulcers, or subcutaneous nodules  PULSES: +radials  TENDER POINTS: 0/18   MSK:  Contracture Dupuytren's of the fifth digit on the right but no synovitis in DIP PIP MCP wrist elbows no tenderness in the proximal upper or lower extremities no swelling of the knees or ankles  Assessment/Plan        Seronegative rheumatoid arthritis -his presentation previously sounded like polymyalgia rheumatica.  He said he never really had any swelling but had stiffness in the hips and shoulders.  Currently on leflunomide 20 mg and has been on prednisone.  Had been on prednisone 10 mg for quite some time and getting off it caused return of symptoms.  Discussed about just trying doing prednisone every other day 5 mg.    Osteoporosis last bone density was 5/17/2022   -Left femoral neck -2.5.  Currently on ibandronate    Osteoarthritis    Will have him do blood work    Will have him come back in 4 months or as needed.  "

## 2023-12-06 NOTE — RESULT ENCOUNTER NOTE
I have reviewed your blood work from 12/4/2023.    Your comprehensive metabolic panel showed a mild decrease in your calcium level but this may be falsely decreased because of your low protein.  Your liver and kidney functions were normal.    Your complete blood count did show some mild anemia but stable from previous.  You still have some mild decrease in your platelet counts.    You had antibodies negative for rheumatoid such as rheumatoid factor and Citrulline antibody.    Your vitamin D level was normal.    Your inflammatory markers, sedimentation rate and C-reactive protein, were normal.

## 2024-01-11 ENCOUNTER — DOCUMENTATION (OUTPATIENT)
Dept: OTOLARYNGOLOGY | Facility: HOSPITAL | Age: 79
End: 2024-01-11
Payer: MEDICARE

## 2024-01-11 NOTE — PROGRESS NOTES
Pre-op. Diagnosis:      1.Deviated nasal septum   2.Bilateral turbinate hypertrophy      Post-op. Diagnosis:      1.Same as pre-op diagnosis      Operation:      1.Septoplasty   2.Bilateral inferior turbinate reduction surgery   3.Bilateral inferior turbinate outfracture      Anesthesia:   GETA       Indications:   78 year old male with history of bilateral nasal obstruction refractory to medical management.  Significant nasal septal deviation and some inferior turbinate hypertrophy on physical examination. The above procedure was recommended and a detailed discussion of the risks goals and alternatives were discussed in detail. Informed consent was indicated and the patient presents today for the procedure.      Details of Procedure:   The patient was seen and identified in the preoperative area and transported to the operating room and positioned on the table in a supine fashion. General anesthesia was induced and the patient was orotracheally intubated without difficulty. A throat pack was placed. The nose was prepared by infiltrating 1% lidocaine with epinepherine into the septal mucosa bilaterally. Neosynepherine soaked pledgets were placed in the nasal cavity bilaterally. The patient was draped in the standard fashion. The pledgets were removed. A Start incision was made in the left septal mucosa and a mucoperichondrial flap was elevated. The anterior cartilage was penetrated and a mucoperichondrial flap was elevated on the right as well.  With endoscopic guidance, deviated portions of the cartilage and bony septum were resected significantly improving the nasal airway.  Subsequent to this, the left inferior turbinate was infiltrated with 4 cc of normal saline. The Gyrus microdebrider with the turbinate blade was used to submucosally resect the turbinate on the left. The inferior turbinate was then outfractured. This further improved the airway on the left. The same procedure was performed to the right  inferior turbinate with a similar result. The nasal cavities were irrigated and suctioned bilaterally. The incision was closed anteriorly on the left with chromic suture. Silastic splints were placed bilaterally and sutured across the septum with prolene suture. Telfa packs were placed for hemostasis bilaterally. A drip pad was placed after cleaning the face. The patient was awakened, extubated, and transferred to the recovery room in good condition. The patient tolerated the procedure well and there were no apparent intraoperative complications.      Specimens:   None      Complications:   None      Findings:   Nasal septal deviation and bilateral inferior turbinate hypertrophy

## 2024-01-17 ENCOUNTER — OFFICE VISIT (OUTPATIENT)
Dept: OTOLARYNGOLOGY | Facility: CLINIC | Age: 79
End: 2024-01-17
Payer: MEDICARE

## 2024-01-17 VITALS — BODY MASS INDEX: 24.73 KG/M2 | WEIGHT: 167 LBS | HEIGHT: 69 IN

## 2024-01-17 DIAGNOSIS — J34.2 DEVIATED NASAL SEPTUM: Primary | ICD-10-CM

## 2024-01-17 PROCEDURE — 1036F TOBACCO NON-USER: CPT | Performed by: OTOLARYNGOLOGY

## 2024-01-17 PROCEDURE — 1160F RVW MEDS BY RX/DR IN RCRD: CPT | Performed by: OTOLARYNGOLOGY

## 2024-01-17 PROCEDURE — 1159F MED LIST DOCD IN RCRD: CPT | Performed by: OTOLARYNGOLOGY

## 2024-01-17 PROCEDURE — 99024 POSTOP FOLLOW-UP VISIT: CPT | Performed by: OTOLARYNGOLOGY

## 2024-01-17 NOTE — PROGRESS NOTES
Patient is approximately 1 week status post septoplasty and bilateral inferior turbinate reduction and outfracture.  He had considerable bleeding in the recovery room.  Cauterized and packed and splints removed at that time.  Since then, uneventful postoperative course.  Pain tolerable.  No significant bleeding.        Exam:  Awake, alert, no apparent distress.  Nasal dorsum midline.  1 packing removed from the left and 2 packings removed from the right.  Septal incision healing well.  Cautery healing well.  No perforations.  Septum midline.  Suctioned bilaterally.  Excellent airway bilaterally.  Satisfied      Impression:  Approximately 1 week status post septoplasty, bilateral inferior turbinate reduction and outfracture.  Doing well.      Packing removed.  Wound care reviewed.  Recommend nasal saline.  Recheck 2 weeks, sooner as needed

## 2024-01-24 ENCOUNTER — OFFICE VISIT (OUTPATIENT)
Dept: OTOLARYNGOLOGY | Facility: CLINIC | Age: 79
End: 2024-01-24
Payer: MEDICARE

## 2024-01-24 DIAGNOSIS — J34.2 DEVIATED NASAL SEPTUM: Primary | ICD-10-CM

## 2024-01-24 PROCEDURE — 1036F TOBACCO NON-USER: CPT | Performed by: OTOLARYNGOLOGY

## 2024-01-24 PROCEDURE — 1159F MED LIST DOCD IN RCRD: CPT | Performed by: OTOLARYNGOLOGY

## 2024-01-24 PROCEDURE — 99024 POSTOP FOLLOW-UP VISIT: CPT | Performed by: OTOLARYNGOLOGY

## 2024-01-24 PROCEDURE — 1157F ADVNC CARE PLAN IN RCRD: CPT | Performed by: OTOLARYNGOLOGY

## 2024-01-24 PROCEDURE — 1160F RVW MEDS BY RX/DR IN RCRD: CPT | Performed by: OTOLARYNGOLOGY

## 2024-01-24 NOTE — PROGRESS NOTES
2 weeks status post septoplasty and bilateral inferior turbinate reduction and outfracture.  He has done well since seen last week.  He has not had any bleeding.  He is breathing great through his nose bilaterally.    Awake, alert, no apparent distress.  Septum midline.  Incision healed.  He has some healing cauterized areas of the left and right.  No perforation seen.    A/p: Healing well status post septo/turbs with postoperative bleeding that has remained controlled.  Functionally he is doing much better than preoperatively.  I have asked him to use some antibiotic ointment twice daily for a week or so and he will recheck in 3 months, sooner as needed

## 2024-01-26 ENCOUNTER — PATIENT MESSAGE (OUTPATIENT)
Dept: RHEUMATOLOGY | Facility: CLINIC | Age: 79
End: 2024-01-26
Payer: MEDICARE

## 2024-01-26 DIAGNOSIS — M13.80 SERONEGATIVE ARTHRITIS: ICD-10-CM

## 2024-01-29 RX ORDER — PREDNISONE 1 MG/1
TABLET ORAL
Qty: 140 TABLET | Refills: 0 | Status: SHIPPED | OUTPATIENT
Start: 2024-01-29 | End: 2024-04-22 | Stop reason: ALTCHOICE

## 2024-01-31 ENCOUNTER — APPOINTMENT (OUTPATIENT)
Dept: OTOLARYNGOLOGY | Facility: CLINIC | Age: 79
End: 2024-01-31
Payer: MEDICARE

## 2024-02-28 ENCOUNTER — OFFICE VISIT (OUTPATIENT)
Dept: PRIMARY CARE | Facility: CLINIC | Age: 79
End: 2024-02-28
Payer: MEDICARE

## 2024-02-28 ENCOUNTER — LAB (OUTPATIENT)
Dept: LAB | Facility: LAB | Age: 79
End: 2024-02-28
Payer: MEDICARE

## 2024-02-28 VITALS
TEMPERATURE: 98 F | HEIGHT: 69 IN | OXYGEN SATURATION: 98 % | DIASTOLIC BLOOD PRESSURE: 68 MMHG | SYSTOLIC BLOOD PRESSURE: 138 MMHG | WEIGHT: 166 LBS | BODY MASS INDEX: 24.59 KG/M2 | HEART RATE: 72 BPM

## 2024-02-28 DIAGNOSIS — M13.80 SERONEGATIVE ARTHRITIS: Chronic | ICD-10-CM

## 2024-02-28 DIAGNOSIS — R79.89 ELEVATED LIVER FUNCTION TESTS: ICD-10-CM

## 2024-02-28 DIAGNOSIS — Z00.00 ROUTINE GENERAL MEDICAL EXAMINATION AT HEALTH CARE FACILITY: Primary | ICD-10-CM

## 2024-02-28 DIAGNOSIS — D64.9 ANEMIA, UNSPECIFIED TYPE: Primary | ICD-10-CM

## 2024-02-28 DIAGNOSIS — R73.03 PREDIABETES: Chronic | ICD-10-CM

## 2024-02-28 DIAGNOSIS — E78.2 MIXED HYPERLIPIDEMIA: ICD-10-CM

## 2024-02-28 DIAGNOSIS — E78.2 MIXED HYPERLIPIDEMIA: Chronic | ICD-10-CM

## 2024-02-28 DIAGNOSIS — I10 PRIMARY HYPERTENSION: ICD-10-CM

## 2024-02-28 DIAGNOSIS — K21.9 GASTROESOPHAGEAL REFLUX DISEASE, UNSPECIFIED WHETHER ESOPHAGITIS PRESENT: Chronic | ICD-10-CM

## 2024-02-28 PROBLEM — M81.0 OSTEOPOROSIS: Status: RESOLVED | Noted: 2023-09-15 | Resolved: 2024-02-28

## 2024-02-28 PROBLEM — E78.5 HYPERLIPIDEMIA: Chronic | Status: ACTIVE | Noted: 2023-09-15

## 2024-02-28 PROBLEM — R04.0 EPISTAXIS: Status: RESOLVED | Noted: 2023-09-15 | Resolved: 2024-02-28

## 2024-02-28 PROBLEM — R79.9 ABNORMAL BLOOD CHEMISTRY: Status: RESOLVED | Noted: 2023-09-15 | Resolved: 2024-02-28

## 2024-02-28 PROBLEM — R21 RASH: Status: RESOLVED | Noted: 2023-09-15 | Resolved: 2024-02-28

## 2024-02-28 PROBLEM — M19.90 OSTEOARTHRITIS: Chronic | Status: ACTIVE | Noted: 2023-09-15

## 2024-02-28 PROBLEM — M06.9 RHEUMATOID ARTHRITIS (MULTI): Chronic | Status: RESOLVED | Noted: 2023-09-15 | Resolved: 2024-02-28

## 2024-02-28 PROBLEM — M06.9 RHEUMATOID ARTHRITIS (MULTI): Chronic | Status: ACTIVE | Noted: 2023-09-15

## 2024-02-28 PROBLEM — N52.9 ERECTILE DYSFUNCTION: Chronic | Status: ACTIVE | Noted: 2023-09-15

## 2024-02-28 PROBLEM — Q76.6 ABNORMALITY OF RIB DETERMINED BY X-RAY: Status: RESOLVED | Noted: 2023-09-15 | Resolved: 2024-02-28

## 2024-02-28 PROBLEM — K13.0 LIP ULCERATION: Status: RESOLVED | Noted: 2023-09-15 | Resolved: 2024-02-28

## 2024-02-28 PROBLEM — K52.9 COLITIS: Status: RESOLVED | Noted: 2023-09-15 | Resolved: 2024-02-28

## 2024-02-28 LAB
ALBUMIN SERPL BCP-MCNC: 3.4 G/DL (ref 3.4–5)
ALP SERPL-CCNC: 97 U/L (ref 33–136)
ALT SERPL W P-5'-P-CCNC: 52 U/L (ref 10–52)
ANION GAP SERPL CALC-SCNC: 13 MMOL/L (ref 10–20)
APPEARANCE UR: ABNORMAL
AST SERPL W P-5'-P-CCNC: 76 U/L (ref 9–39)
BASOPHILS # BLD AUTO: 0.04 X10*3/UL (ref 0–0.1)
BASOPHILS NFR BLD AUTO: 0.9 %
BILIRUB SERPL-MCNC: 0.3 MG/DL (ref 0–1.2)
BILIRUB UR STRIP.AUTO-MCNC: NEGATIVE MG/DL
BUN SERPL-MCNC: 17 MG/DL (ref 6–23)
CALCIUM SERPL-MCNC: 7.9 MG/DL (ref 8.6–10.3)
CHLORIDE SERPL-SCNC: 108 MMOL/L (ref 98–107)
CHOLEST SERPL-MCNC: 185 MG/DL (ref 0–199)
CHOLESTEROL/HDL RATIO: 3.4
CO2 SERPL-SCNC: 24 MMOL/L (ref 21–32)
COLOR UR: ABNORMAL
CREAT SERPL-MCNC: 1.03 MG/DL (ref 0.5–1.3)
EGFRCR SERPLBLD CKD-EPI 2021: 74 ML/MIN/1.73M*2
EOSINOPHIL # BLD AUTO: 0.34 X10*3/UL (ref 0–0.4)
EOSINOPHIL NFR BLD AUTO: 7.7 %
ERYTHROCYTE [DISTWIDTH] IN BLOOD BY AUTOMATED COUNT: 15.5 % (ref 11.5–14.5)
GLUCOSE SERPL-MCNC: 127 MG/DL (ref 74–99)
GLUCOSE UR STRIP.AUTO-MCNC: NEGATIVE MG/DL
HCT VFR BLD AUTO: 32.9 % (ref 41–52)
HDLC SERPL-MCNC: 54.3 MG/DL
HGB BLD-MCNC: 10.1 G/DL (ref 13.5–17.5)
HOLD SPECIMEN: NORMAL
IMM GRANULOCYTES # BLD AUTO: 0.02 X10*3/UL (ref 0–0.5)
IMM GRANULOCYTES NFR BLD AUTO: 0.5 % (ref 0–0.9)
KETONES UR STRIP.AUTO-MCNC: NEGATIVE MG/DL
LDLC SERPL CALC-MCNC: 85 MG/DL
LEUKOCYTE ESTERASE UR QL STRIP.AUTO: NEGATIVE
LYMPHOCYTES # BLD AUTO: 0.89 X10*3/UL (ref 0.8–3)
LYMPHOCYTES NFR BLD AUTO: 20.3 %
MCH RBC QN AUTO: 30.9 PG (ref 26–34)
MCHC RBC AUTO-ENTMCNC: 30.7 G/DL (ref 32–36)
MCV RBC AUTO: 101 FL (ref 80–100)
MONOCYTES # BLD AUTO: 0.61 X10*3/UL (ref 0.05–0.8)
MONOCYTES NFR BLD AUTO: 13.9 %
NEUTROPHILS # BLD AUTO: 2.49 X10*3/UL (ref 1.6–5.5)
NEUTROPHILS NFR BLD AUTO: 56.7 %
NITRITE UR QL STRIP.AUTO: NEGATIVE
NON HDL CHOLESTEROL: 131 MG/DL (ref 0–149)
NRBC BLD-RTO: 0 /100 WBCS (ref 0–0)
PH UR STRIP.AUTO: 5 [PH]
PLATELET # BLD AUTO: 133 X10*3/UL (ref 150–450)
POTASSIUM SERPL-SCNC: 4 MMOL/L (ref 3.5–5.3)
PROT SERPL-MCNC: 5.8 G/DL (ref 6.4–8.2)
PROT UR STRIP.AUTO-MCNC: NEGATIVE MG/DL
RBC # BLD AUTO: 3.27 X10*6/UL (ref 4.5–5.9)
RBC # UR STRIP.AUTO: NEGATIVE /UL
SODIUM SERPL-SCNC: 141 MMOL/L (ref 136–145)
SP GR UR STRIP.AUTO: 1.02
TRIGL SERPL-MCNC: 227 MG/DL (ref 0–149)
TSH SERPL-ACNC: 0.67 MIU/L (ref 0.44–3.98)
UROBILINOGEN UR STRIP.AUTO-MCNC: <2 MG/DL
VLDL: 45 MG/DL (ref 0–40)
WBC # BLD AUTO: 4.4 X10*3/UL (ref 4.4–11.3)

## 2024-02-28 PROCEDURE — 86803 HEPATITIS C AB TEST: CPT

## 2024-02-28 PROCEDURE — 85025 COMPLETE CBC W/AUTO DIFF WBC: CPT

## 2024-02-28 PROCEDURE — 3075F SYST BP GE 130 - 139MM HG: CPT | Performed by: FAMILY MEDICINE

## 2024-02-28 PROCEDURE — 86015 ACTIN ANTIBODY EACH: CPT

## 2024-02-28 PROCEDURE — G0439 PPPS, SUBSEQ VISIT: HCPCS | Performed by: FAMILY MEDICINE

## 2024-02-28 PROCEDURE — 86038 ANTINUCLEAR ANTIBODIES: CPT

## 2024-02-28 PROCEDURE — 1157F ADVNC CARE PLAN IN RCRD: CPT | Performed by: FAMILY MEDICINE

## 2024-02-28 PROCEDURE — 80053 COMPREHEN METABOLIC PANEL: CPT

## 2024-02-28 PROCEDURE — 87340 HEPATITIS B SURFACE AG IA: CPT

## 2024-02-28 PROCEDURE — 86256 FLUORESCENT ANTIBODY TITER: CPT

## 2024-02-28 PROCEDURE — 99214 OFFICE O/P EST MOD 30 MIN: CPT | Performed by: FAMILY MEDICINE

## 2024-02-28 PROCEDURE — 81003 URINALYSIS AUTO W/O SCOPE: CPT

## 2024-02-28 PROCEDURE — 1170F FXNL STATUS ASSESSED: CPT | Performed by: FAMILY MEDICINE

## 2024-02-28 PROCEDURE — 82728 ASSAY OF FERRITIN: CPT

## 2024-02-28 PROCEDURE — 1036F TOBACCO NON-USER: CPT | Performed by: FAMILY MEDICINE

## 2024-02-28 PROCEDURE — 1160F RVW MEDS BY RX/DR IN RCRD: CPT | Performed by: FAMILY MEDICINE

## 2024-02-28 PROCEDURE — 80061 LIPID PANEL: CPT

## 2024-02-28 PROCEDURE — 36415 COLL VENOUS BLD VENIPUNCTURE: CPT

## 2024-02-28 PROCEDURE — 83036 HEMOGLOBIN GLYCOSYLATED A1C: CPT

## 2024-02-28 PROCEDURE — 85045 AUTOMATED RETICULOCYTE COUNT: CPT

## 2024-02-28 PROCEDURE — 86708 HEPATITIS A ANTIBODY: CPT

## 2024-02-28 PROCEDURE — 82607 VITAMIN B-12: CPT

## 2024-02-28 PROCEDURE — 99215 OFFICE O/P EST HI 40 MIN: CPT | Performed by: FAMILY MEDICINE

## 2024-02-28 PROCEDURE — 3078F DIAST BP <80 MM HG: CPT | Performed by: FAMILY MEDICINE

## 2024-02-28 PROCEDURE — 1159F MED LIST DOCD IN RCRD: CPT | Performed by: FAMILY MEDICINE

## 2024-02-28 PROCEDURE — 84443 ASSAY THYROID STIM HORMONE: CPT

## 2024-02-28 PROCEDURE — 1125F AMNT PAIN NOTED PAIN PRSNT: CPT | Performed by: FAMILY MEDICINE

## 2024-02-28 ASSESSMENT — ENCOUNTER SYMPTOMS
PSYCHIATRIC NEGATIVE: 1
LOSS OF SENSATION IN FEET: 0
RESPIRATORY NEGATIVE: 1
NEUROLOGICAL NEGATIVE: 1
CONSTITUTIONAL NEGATIVE: 1
OCCASIONAL FEELINGS OF UNSTEADINESS: 0
GASTROINTESTINAL NEGATIVE: 1
EYES NEGATIVE: 1
ENDOCRINE NEGATIVE: 1
MUSCULOSKELETAL NEGATIVE: 1
ALLERGIC/IMMUNOLOGIC NEGATIVE: 1
DEPRESSION: 0

## 2024-02-28 ASSESSMENT — PATIENT HEALTH QUESTIONNAIRE - PHQ9
SUM OF ALL RESPONSES TO PHQ9 QUESTIONS 1 AND 2: 0
1. LITTLE INTEREST OR PLEASURE IN DOING THINGS: NOT AT ALL
2. FEELING DOWN, DEPRESSED OR HOPELESS: NOT AT ALL

## 2024-02-28 ASSESSMENT — ACTIVITIES OF DAILY LIVING (ADL)
ADEQUATE_TO_COMPLETE_ADL: YES
GROCERY_SHOPPING: INDEPENDENT
DRESSING: INDEPENDENT
FEEDING: INDEPENDENT
BATHING: INDEPENDENT
TOILETING: INDEPENDENT
DOING_HOUSEWORK: INDEPENDENT
JUDGMENT_ADEQUATE_SAFELY_COMPLETE_DAILY_ACTIVITIES: YES
MANAGING_FINANCES: INDEPENDENT
TAKING_MEDICATION: INDEPENDENT

## 2024-02-28 ASSESSMENT — LIFESTYLE VARIABLES
AUDIT-C TOTAL SCORE: 3
HOW OFTEN DO YOU HAVE A DRINK CONTAINING ALCOHOL: 2-3 TIMES A WEEK
SKIP TO QUESTIONS 9-10: 1
HOW MANY STANDARD DRINKS CONTAINING ALCOHOL DO YOU HAVE ON A TYPICAL DAY: 1 OR 2
HOW OFTEN DO YOU HAVE SIX OR MORE DRINKS ON ONE OCCASION: NEVER

## 2024-02-28 ASSESSMENT — PAIN SCALES - GENERAL: PAINLEVEL: 7

## 2024-02-28 ASSESSMENT — COGNITIVE AND FUNCTIONAL STATUS - GENERAL: TRAIL MAKING TEST: PATIENT DOES NOT COMPLETE TRAIL MAKING TEST PROPERLY.

## 2024-02-28 NOTE — PROGRESS NOTES
"Subjective   Reason for Visit: Bret Rowland is an 78 y.o. male here for a Medicare Wellness visit.     Past Medical, Surgical, and Family History reviewed and updated in chart.    Reviewed all medications by prescribing practitioner or clinical pharmacist (such as prescriptions, OTCs, herbal therapies and supplements) and documented in the medical record.    HPI  Hyperlipidemia:          Patient here for F/U. stable, due for fasting lipid profile, currently trying to control with therapeutic lifestyle changes.   Hypertension:          Patient here for F/U. at goal, doing well and without complaints, tolerating meds with no side effects.   Diabetes Mellitus:          Insulin Resistance diagnosed with pre diabetes, follow fasting blood sugars periodically, counseled on weight loss and regular exercise.   GERD:          GERD F/U doing well and without complaints, controlled.   Inflammatory Polyarthritis:          c/o Morning stiffness on prednisone for serroneg ra managed by long at [facility]          c/o Joint pain.          Denies : Joint swelling.   Patient Care Team:  Justin Gallo MD as PCP - General (Family Medicine)  Justin Gallo MD as PCP - [employer] PCP     Review of Systems   Constitutional: Negative.    HENT: Negative.     Eyes: Negative.    Respiratory: Negative.     Gastrointestinal: Negative.    Endocrine: Negative.    Genitourinary: Negative.    Musculoskeletal: Negative.    Skin: Negative.    Allergic/Immunologic: Negative.    Neurological: Negative.    Psychiatric/Behavioral: Negative.         Objective   Vitals:  /68   Pulse 72   Temp 36.7 °C (98 °F) (Temporal)   Ht 1.753 m (5' 9\")   Wt 75.3 kg (166 lb)   SpO2 98%   BMI 24.51 kg/m²       Physical Exam  Vitals reviewed.   Constitutional:       Appearance: Normal appearance.   HENT:      Right Ear: Tympanic membrane, ear canal and external ear normal.      Left Ear: Tympanic membrane, ear canal and external ear " normal.      Nose: Nose normal.      Mouth/Throat:      Mouth: Mucous membranes are moist.   Eyes:      Extraocular Movements: Extraocular movements intact.      Conjunctiva/sclera: Conjunctivae normal.      Pupils: Pupils are equal, round, and reactive to light.   Cardiovascular:      Rate and Rhythm: Normal rate and regular rhythm.      Pulses: Normal pulses.      Heart sounds: Normal heart sounds.   Pulmonary:      Breath sounds: Normal breath sounds.   Abdominal:      General: Abdomen is flat. Bowel sounds are normal.      Palpations: Abdomen is soft.   Musculoskeletal:         General: Normal range of motion.      Cervical back: Neck supple.   Skin:     General: Skin is warm.   Neurological:      General: No focal deficit present.      Mental Status: He is alert and oriented to person, place, and time.      Cranial Nerves: Cranial nerves 2-12 are intact.   Psychiatric:         Attention and Perception: Attention normal.         Mood and Affect: Mood normal.         Assessment/Plan   Problem List Items Addressed This Visit       Gastroesophageal reflux disease (Chronic)    Hyperlipidemia (Chronic)    Relevant Orders    Urinalysis with Reflex Culture and Microscopic    Comprehensive Metabolic Panel    CBC and Auto Differential    TSH with reflex to Free T4 if abnormal    Lipid Panel    Hypertension (Chronic)    Relevant Orders    Urinalysis with Reflex Culture and Microscopic    Comprehensive Metabolic Panel    CBC and Auto Differential    TSH with reflex to Free T4 if abnormal    Lipid Panel    Prediabetes (Chronic)    Relevant Orders    Hemoglobin A1C    Seronegative arthritis (Chronic)     Other Visit Diagnoses       Routine general medical examination at health care facility    -  Primary    Relevant Orders    1 Year Follow Up In Primary Care - Wellness Exam          Rto 6mo    Cont current meds

## 2024-02-29 ENCOUNTER — TELEPHONE (OUTPATIENT)
Dept: PRIMARY CARE | Facility: CLINIC | Age: 79
End: 2024-02-29
Payer: MEDICARE

## 2024-02-29 LAB
EST. AVERAGE GLUCOSE BLD GHB EST-MCNC: 108 MG/DL
FERRITIN SERPL-MCNC: 58 NG/ML (ref 20–300)
HAV AB SER QL IA: NONREACTIVE
HBA1C MFR BLD: 5.4 %
HCV AB SER QL: NONREACTIVE
HGB RETIC QN: 30 PG (ref 28–38)
IMMATURE RETIC FRACTION: 18.8 %
RETICS #: 0.05 X10*6/UL (ref 0.02–0.11)
RETICS/RBC NFR AUTO: 1.7 % (ref 0.5–2)
VIT B12 SERPL-MCNC: 1616 PG/ML (ref 211–911)

## 2024-03-01 ENCOUNTER — HOSPITAL ENCOUNTER (OUTPATIENT)
Dept: RADIOLOGY | Facility: HOSPITAL | Age: 79
Discharge: HOME | End: 2024-03-01
Payer: MEDICARE

## 2024-03-01 ENCOUNTER — TELEPHONE (OUTPATIENT)
Dept: PRIMARY CARE | Facility: CLINIC | Age: 79
End: 2024-03-01
Payer: MEDICARE

## 2024-03-01 DIAGNOSIS — R79.89 ELEVATED LIVER FUNCTION TESTS: ICD-10-CM

## 2024-03-01 LAB — HBV SURFACE AG SERPL QL IA: NORMAL

## 2024-03-01 PROCEDURE — 76705 ECHO EXAM OF ABDOMEN: CPT

## 2024-03-01 NOTE — TELEPHONE ENCOUNTER
Kayla from client services called to say the test for Hep B surface antigen was cancelled, due to not enough sample.

## 2024-03-03 LAB — ANA SER QL HEP2 SUBST: NEGATIVE

## 2024-03-04 LAB — SMOOTH MUSCLE AB SER QL IF: ABNORMAL

## 2024-03-08 ENCOUNTER — TELEPHONE (OUTPATIENT)
Dept: PRIMARY CARE | Facility: CLINIC | Age: 79
End: 2024-03-08
Payer: MEDICARE

## 2024-03-08 NOTE — TELEPHONE ENCOUNTER
Pt's wife called back about lab results. Columba is aware and she says Oren has an upcoming appt with Dr. Prashant Dean.

## 2024-03-27 ENCOUNTER — HOSPITAL ENCOUNTER (OUTPATIENT)
Dept: RADIOLOGY | Facility: HOSPITAL | Age: 79
Discharge: HOME | End: 2024-03-27
Payer: MEDICARE

## 2024-03-27 ENCOUNTER — LAB (OUTPATIENT)
Dept: LAB | Facility: LAB | Age: 79
End: 2024-03-27
Payer: MEDICARE

## 2024-03-27 DIAGNOSIS — M81.0 OSTEOPOROSIS, UNSPECIFIED OSTEOPOROSIS TYPE, UNSPECIFIED PATHOLOGICAL FRACTURE PRESENCE: ICD-10-CM

## 2024-03-27 DIAGNOSIS — R74.8 ABNORMAL LEVELS OF OTHER SERUM ENZYMES: ICD-10-CM

## 2024-03-27 DIAGNOSIS — D64.9 ANEMIA, UNSPECIFIED TYPE: ICD-10-CM

## 2024-03-27 DIAGNOSIS — M13.80 SERONEGATIVE ARTHRITIS: ICD-10-CM

## 2024-03-27 DIAGNOSIS — R79.89 ELEVATED LIVER FUNCTION TESTS: ICD-10-CM

## 2024-03-27 DIAGNOSIS — Z79.899 ENCOUNTER FOR LONG-TERM (CURRENT) USE OF MEDICATIONS: ICD-10-CM

## 2024-03-27 LAB
ALBUMIN SERPL BCP-MCNC: 3.5 G/DL (ref 3.4–5)
ALP SERPL-CCNC: 94 U/L (ref 33–136)
ALT SERPL W P-5'-P-CCNC: 55 U/L (ref 10–52)
ANION GAP SERPL CALC-SCNC: 12 MMOL/L (ref 10–20)
AST SERPL W P-5'-P-CCNC: 57 U/L (ref 9–39)
BILIRUB DIRECT SERPL-MCNC: 0.1 MG/DL (ref 0–0.3)
BILIRUB SERPL-MCNC: 0.4 MG/DL (ref 0–1.2)
BUN SERPL-MCNC: 13 MG/DL (ref 6–23)
CALCIUM SERPL-MCNC: 8.3 MG/DL (ref 8.6–10.3)
CHLORIDE SERPL-SCNC: 108 MMOL/L (ref 98–107)
CO2 SERPL-SCNC: 25 MMOL/L (ref 21–32)
CREAT SERPL-MCNC: 1.03 MG/DL (ref 0.5–1.3)
CRP SERPL-MCNC: 0.43 MG/DL
EGFRCR SERPLBLD CKD-EPI 2021: 74 ML/MIN/1.73M*2
ERYTHROCYTE [DISTWIDTH] IN BLOOD BY AUTOMATED COUNT: 14.6 % (ref 11.5–14.5)
ERYTHROCYTE [SEDIMENTATION RATE] IN BLOOD BY WESTERGREN METHOD: 9 MM/H (ref 0–20)
GLUCOSE SERPL-MCNC: 96 MG/DL (ref 74–99)
HCT VFR BLD AUTO: 33.7 % (ref 41–52)
HGB BLD-MCNC: 10.3 G/DL (ref 13.5–17.5)
MCH RBC QN AUTO: 29.5 PG (ref 26–34)
MCHC RBC AUTO-ENTMCNC: 30.6 G/DL (ref 32–36)
MCV RBC AUTO: 97 FL (ref 80–100)
NRBC BLD-RTO: 0 /100 WBCS (ref 0–0)
PLATELET # BLD AUTO: 157 X10*3/UL (ref 150–450)
POTASSIUM SERPL-SCNC: 4.1 MMOL/L (ref 3.5–5.3)
PROT SERPL-MCNC: 6.1 G/DL (ref 6.4–8.2)
RBC # BLD AUTO: 3.49 X10*6/UL (ref 4.5–5.9)
SODIUM SERPL-SCNC: 141 MMOL/L (ref 136–145)
WBC # BLD AUTO: 5 X10*3/UL (ref 4.4–11.3)

## 2024-03-27 PROCEDURE — 86140 C-REACTIVE PROTEIN: CPT

## 2024-03-27 PROCEDURE — 85027 COMPLETE CBC AUTOMATED: CPT

## 2024-03-27 PROCEDURE — 82248 BILIRUBIN DIRECT: CPT

## 2024-03-27 PROCEDURE — 36415 COLL VENOUS BLD VENIPUNCTURE: CPT

## 2024-03-27 PROCEDURE — 76981 USE PARENCHYMA: CPT | Performed by: RADIOLOGY

## 2024-03-27 PROCEDURE — 85652 RBC SED RATE AUTOMATED: CPT

## 2024-03-27 PROCEDURE — 80053 COMPREHEN METABOLIC PANEL: CPT

## 2024-03-27 PROCEDURE — 76981 USE PARENCHYMA: CPT

## 2024-04-02 DIAGNOSIS — M13.80 SERONEGATIVE ARTHRITIS: Primary | ICD-10-CM

## 2024-04-03 RX ORDER — LEFLUNOMIDE 20 MG/1
20 TABLET ORAL DAILY
Qty: 30 TABLET | Refills: 0 | Status: SHIPPED | OUTPATIENT
Start: 2024-04-03 | End: 2024-04-04 | Stop reason: SDUPTHER

## 2024-04-04 DIAGNOSIS — I10 PRIMARY HYPERTENSION: ICD-10-CM

## 2024-04-04 DIAGNOSIS — M13.80 SERONEGATIVE ARTHRITIS: ICD-10-CM

## 2024-04-04 RX ORDER — LISINOPRIL 10 MG/1
10 TABLET ORAL DAILY
Qty: 90 TABLET | Refills: 1 | Status: SHIPPED | OUTPATIENT
Start: 2024-04-04

## 2024-04-05 RX ORDER — LEFLUNOMIDE 20 MG/1
20 TABLET ORAL DAILY
Qty: 30 TABLET | Refills: 1 | Status: SHIPPED | OUTPATIENT
Start: 2024-04-05 | End: 2024-04-22 | Stop reason: SDUPTHER

## 2024-04-05 RX ORDER — LISINOPRIL 10 MG/1
10 TABLET ORAL DAILY
Qty: 90 TABLET | Refills: 1 | OUTPATIENT
Start: 2024-04-05

## 2024-04-22 ENCOUNTER — LAB (OUTPATIENT)
Dept: LAB | Facility: LAB | Age: 79
End: 2024-04-22
Payer: MEDICARE

## 2024-04-22 ENCOUNTER — OFFICE VISIT (OUTPATIENT)
Dept: RHEUMATOLOGY | Facility: CLINIC | Age: 79
End: 2024-04-22
Payer: MEDICARE

## 2024-04-22 VITALS
BODY MASS INDEX: 24.29 KG/M2 | HEART RATE: 101 BPM | OXYGEN SATURATION: 96 % | WEIGHT: 164 LBS | SYSTOLIC BLOOD PRESSURE: 123 MMHG | HEIGHT: 69 IN | DIASTOLIC BLOOD PRESSURE: 71 MMHG

## 2024-04-22 DIAGNOSIS — M13.80 SERONEGATIVE ARTHRITIS: ICD-10-CM

## 2024-04-22 DIAGNOSIS — M13.80 SERONEGATIVE ARTHRITIS: Primary | ICD-10-CM

## 2024-04-22 DIAGNOSIS — M81.0 OSTEOPOROSIS, UNSPECIFIED OSTEOPOROSIS TYPE, UNSPECIFIED PATHOLOGICAL FRACTURE PRESENCE: ICD-10-CM

## 2024-04-22 LAB
CCP IGG SERPL-ACNC: <1 U/ML
CENTROMERE B AB SER-ACNC: <0.2 AI
CHROMATIN AB SERPL-ACNC: <0.2 AI
CRP SERPL-MCNC: 0.41 MG/DL
DSDNA AB SER-ACNC: <1 IU/ML
ENA JO1 AB SER QL IA: <0.2 AI
ENA RNP AB SER IA-ACNC: <0.2 AI
ENA SCL70 AB SER QL IA: <0.2 AI
ENA SM AB SER IA-ACNC: <0.2 AI
ENA SM+RNP AB SER QL IA: <0.2 AI
ENA SS-A AB SER IA-ACNC: <0.2 AI
ENA SS-B AB SER IA-ACNC: <0.2 AI
ERYTHROCYTE [SEDIMENTATION RATE] IN BLOOD BY WESTERGREN METHOD: 18 MM/H (ref 0–20)
HBV CORE AB SER QL: NONREACTIVE
HBV SURFACE AG SERPL QL IA: NONREACTIVE
HCV AB SER QL: NONREACTIVE
RHEUMATOID FACT SER NEPH-ACNC: <10 IU/ML (ref 0–15)
RIBOSOMAL P AB SER-ACNC: <0.2 AI

## 2024-04-22 PROCEDURE — 86704 HEP B CORE ANTIBODY TOTAL: CPT

## 2024-04-22 PROCEDURE — 86200 CCP ANTIBODY: CPT

## 2024-04-22 PROCEDURE — 36415 COLL VENOUS BLD VENIPUNCTURE: CPT

## 2024-04-22 PROCEDURE — 1159F MED LIST DOCD IN RCRD: CPT | Performed by: INTERNAL MEDICINE

## 2024-04-22 PROCEDURE — 3078F DIAST BP <80 MM HG: CPT | Performed by: INTERNAL MEDICINE

## 2024-04-22 PROCEDURE — 3074F SYST BP LT 130 MM HG: CPT | Performed by: INTERNAL MEDICINE

## 2024-04-22 PROCEDURE — 99214 OFFICE O/P EST MOD 30 MIN: CPT | Performed by: INTERNAL MEDICINE

## 2024-04-22 PROCEDURE — 1157F ADVNC CARE PLAN IN RCRD: CPT | Performed by: INTERNAL MEDICINE

## 2024-04-22 PROCEDURE — 86140 C-REACTIVE PROTEIN: CPT

## 2024-04-22 PROCEDURE — 86803 HEPATITIS C AB TEST: CPT

## 2024-04-22 PROCEDURE — 85652 RBC SED RATE AUTOMATED: CPT

## 2024-04-22 PROCEDURE — 1160F RVW MEDS BY RX/DR IN RCRD: CPT | Performed by: INTERNAL MEDICINE

## 2024-04-22 PROCEDURE — 86235 NUCLEAR ANTIGEN ANTIBODY: CPT

## 2024-04-22 PROCEDURE — 86481 TB AG RESPONSE T-CELL SUSP: CPT

## 2024-04-22 PROCEDURE — 86038 ANTINUCLEAR ANTIBODIES: CPT

## 2024-04-22 PROCEDURE — 86431 RHEUMATOID FACTOR QUANT: CPT

## 2024-04-22 PROCEDURE — 87340 HEPATITIS B SURFACE AG IA: CPT

## 2024-04-22 PROCEDURE — 86225 DNA ANTIBODY NATIVE: CPT

## 2024-04-22 RX ORDER — IBANDRONATE SODIUM 150 MG/1
150 TABLET, FILM COATED ORAL
Qty: 3 TABLET | Refills: 3 | Status: SHIPPED | OUTPATIENT
Start: 2024-04-22 | End: 2025-04-22

## 2024-04-22 RX ORDER — LEFLUNOMIDE 20 MG/1
20 TABLET ORAL DAILY
Qty: 30 TABLET | Refills: 5 | Status: SHIPPED | OUTPATIENT
Start: 2024-04-22 | End: 2024-05-07 | Stop reason: ALTCHOICE

## 2024-04-22 ASSESSMENT — ENCOUNTER SYMPTOMS
BRUISES/BLEEDS EASILY: 1
FATIGUE: 1
SHORTNESS OF BREATH: 1

## 2024-04-22 NOTE — PROGRESS NOTES
"Subjective   Patient ID: Bret Rowland \"Oren\" is a 78 y.o. male who presents for Arthritis (4 month follow up ).  HPI  Patient with history of seronegative rheumatoid arthritis, osteoarthritis, and osteoporosis previously seen by Dr. Avila and Dr. Louis.  Previous medications have included Plaquenil    When I had last seen him in December he was on leflunomide 20 mg and prednisone 10 mg.  He had been attempting to try to taper off.  He has now been off of prednisone for 1 month.  He has been taking 8 Excedrin a day and is still very achy.  Has swelling in his hands and has some finger pain.  His pain in his hips, neck, shoulders,.  He goes on his exercise bike for about 10 minutes.  Has a lot of stiffness after sitting for a while.  Review of Systems   Constitutional:  Positive for fatigue.   Eyes:         Wears glasses   Respiratory:  Positive for shortness of breath.         Previous smoker quit 35 years ago   Cardiovascular:  Negative for chest pain.        Hypertension, had full cardiac workup for his shortness of breath on exertion with cardiology and was negative.   Gastrointestinal:         Had  h/o microscopic colitis.  Now controlling it with diet.  Previously had been on budesonide and colestipol.   Musculoskeletal:         Per HPI   Hematological:  Bruises/bleeds easily.   Psychiatric/Behavioral:  Positive for sleep disturbance.        Objective   Physical Exam  GEN: NAD A&O x3 appropriate affect able to get onto the exam table  EYES:  no conjunctival redness, eyelids normal, wears glasses  SKIN: no rashes, ulcers, or subcutaneous nodules  PULSES: +radials  TENDER POINTS: 0/18   MSK:  Contracture Dupuytren's of the fifth digit on the right but no synovitis in DIP PIP MCP wrist elbows no tenderness in the proximal upper or lower extremities no swelling of the knees or ankles  Assessment/Plan        Seronegative rheumatoid arthritis versus PMR symptoms.  Uncertain of his diagnosis since he has been on " medicines for quite some time.  Has had difficulty getting off prednisone and now has been off for about a month.  Will check his inflammatory markers.  Currently on leflunomide 10 mg   -If his inflammatory markers are elevated would consider possible biologic.  He has had elevations of transaminases so the use of Kevzara/Actemra would be prohibited.  Could consider the use of Orencia or anti-TNF inhibitor.  Elevation of transaminases with negative STEVE and just very mildly elevated anti-smooth muscle antibody.  Has been seen by Dr. Dean and had an ultrasound that did show some mild to moderate fibrotic changes of the liver.  Has been on prednisone off and on and even with prednisone his transaminases has been elevated.    Osteoporosis last bone density was 5/17/2022   -Left femoral neck -2.5.  Currently on ibandronate   -Will be due for bone density in May    Osteoarthritis        Will have him come back in 4 months or as needed.

## 2024-04-23 LAB — ANA SER QL HEP2 SUBST: NEGATIVE

## 2024-04-24 ENCOUNTER — LAB (OUTPATIENT)
Dept: LAB | Facility: LAB | Age: 79
End: 2024-04-24
Payer: MEDICARE

## 2024-04-24 ENCOUNTER — OFFICE VISIT (OUTPATIENT)
Dept: OTOLARYNGOLOGY | Facility: CLINIC | Age: 79
End: 2024-04-24
Payer: MEDICARE

## 2024-04-24 ENCOUNTER — PATIENT MESSAGE (OUTPATIENT)
Dept: RHEUMATOLOGY | Facility: CLINIC | Age: 79
End: 2024-04-24

## 2024-04-24 VITALS — HEIGHT: 69 IN | BODY MASS INDEX: 24.44 KG/M2 | WEIGHT: 165 LBS

## 2024-04-24 DIAGNOSIS — M13.80 SERONEGATIVE ARTHRITIS: Primary | ICD-10-CM

## 2024-04-24 DIAGNOSIS — M13.80 SERONEGATIVE ARTHRITIS: ICD-10-CM

## 2024-04-24 DIAGNOSIS — J34.2 DEVIATED NASAL SEPTUM: Primary | ICD-10-CM

## 2024-04-24 LAB
NIL(NEG) CONTROL SPOT COUNT: NORMAL
PANEL A SPOT COUNT: 0
PANEL B SPOT COUNT: 0
POS CONTROL SPOT COUNT: NORMAL
T-SPOT. TB INTERPRETATION: NEGATIVE

## 2024-04-24 PROCEDURE — 99024 POSTOP FOLLOW-UP VISIT: CPT | Performed by: OTOLARYNGOLOGY

## 2024-04-24 PROCEDURE — 36415 COLL VENOUS BLD VENIPUNCTURE: CPT

## 2024-04-24 PROCEDURE — 1157F ADVNC CARE PLAN IN RCRD: CPT | Performed by: OTOLARYNGOLOGY

## 2024-04-24 PROCEDURE — 83789 MASS SPECTROMETRY QUAL/QUAN: CPT

## 2024-04-24 PROCEDURE — 82657 ENZYME CELL ACTIVITY: CPT

## 2024-04-24 PROCEDURE — 1159F MED LIST DOCD IN RCRD: CPT | Performed by: OTOLARYNGOLOGY

## 2024-04-24 PROCEDURE — 1160F RVW MEDS BY RX/DR IN RCRD: CPT | Performed by: OTOLARYNGOLOGY

## 2024-04-24 ASSESSMENT — ENCOUNTER SYMPTOMS: SLEEP DISTURBANCE: 1

## 2024-04-24 NOTE — PROGRESS NOTES
status post septoplasty and bilateral inferior turbinate reduction and outfracture 1/2024.  He has done well.  He has not had any bleeding.  He is breathing very well through his nose bilaterally.    Awake, alert, no apparent distress.  Septum midline.  Incision healed.  He is healed at the cauterized anterior septum.  No perforation seen anteriorly.  Small clean posterior perforation seen better through right nasal cavity.    A/p: Healed well.  Breathing well.  Nasal moisture. Recheck as needed.

## 2024-04-27 LAB — TPMT BLD-CCNC: 29.6 U/ML (ref 24–44)

## 2024-05-07 RX ORDER — AZATHIOPRINE 50 MG/1
50 TABLET ORAL 2 TIMES DAILY
Qty: 60 TABLET | Refills: 5 | Status: SHIPPED | OUTPATIENT
Start: 2024-05-07 | End: 2024-05-30 | Stop reason: ALTCHOICE

## 2024-05-08 ENCOUNTER — OFFICE VISIT (OUTPATIENT)
Dept: PODIATRY | Facility: CLINIC | Age: 79
End: 2024-05-08
Payer: MEDICARE

## 2024-05-08 ENCOUNTER — LAB (OUTPATIENT)
Dept: LAB | Facility: LAB | Age: 79
End: 2024-05-08
Payer: MEDICARE

## 2024-05-08 DIAGNOSIS — B35.1 FUNGAL NAIL INFECTION: ICD-10-CM

## 2024-05-08 DIAGNOSIS — M79.672 LEFT FOOT PAIN: Primary | ICD-10-CM

## 2024-05-08 DIAGNOSIS — R74.8 ABNORMAL LEVELS OF OTHER SERUM ENZYMES: Primary | ICD-10-CM

## 2024-05-08 DIAGNOSIS — L85.1 PLANTAR POROKERATOSIS, ACQUIRED: ICD-10-CM

## 2024-05-08 LAB
ALBUMIN SERPL BCP-MCNC: 3.9 G/DL (ref 3.4–5)
ALP SERPL-CCNC: 77 U/L (ref 33–136)
ALT SERPL W P-5'-P-CCNC: 37 U/L (ref 10–52)
AST SERPL W P-5'-P-CCNC: 42 U/L (ref 9–39)
BILIRUB DIRECT SERPL-MCNC: 0.1 MG/DL (ref 0–0.3)
BILIRUB SERPL-MCNC: 0.3 MG/DL (ref 0–1.2)
PROT SERPL-MCNC: 5.9 G/DL (ref 6.4–8.2)

## 2024-05-08 PROCEDURE — 1160F RVW MEDS BY RX/DR IN RCRD: CPT | Performed by: PODIATRIST

## 2024-05-08 PROCEDURE — 1036F TOBACCO NON-USER: CPT | Performed by: PODIATRIST

## 2024-05-08 PROCEDURE — 1159F MED LIST DOCD IN RCRD: CPT | Performed by: PODIATRIST

## 2024-05-08 PROCEDURE — 99203 OFFICE O/P NEW LOW 30 MIN: CPT | Performed by: PODIATRIST

## 2024-05-08 PROCEDURE — 80076 HEPATIC FUNCTION PANEL: CPT

## 2024-05-08 PROCEDURE — 1157F ADVNC CARE PLAN IN RCRD: CPT | Performed by: PODIATRIST

## 2024-05-08 PROCEDURE — 36415 COLL VENOUS BLD VENIPUNCTURE: CPT

## 2024-05-08 NOTE — PROGRESS NOTES
This is a 78 y.o. male new patient for foot pain    History of Present Illness:   Patient states they are here for  foot pain  Left foot has callous  Tender when walking  Also has question of fungal nail to right foot  Has CNU he has been applying, happy with results  No other pedal complaints    Past Medical History  Past Medical History:   Diagnosis Date    Personal history of other diseases of the digestive system     History of gastroesophageal reflux (GERD)    Personal history of other diseases of the musculoskeletal system and connective tissue     History of arthritis       Medications and Allergies have been reviewed.    Review Of Systems:  GENERAL: No weight loss, malaise or fevers.  HEENT: Negative for frequent or significant headaches,   RESPIRATORY: Negative for cough, wheezing or shortness of breath.  CARDIOVASCULAR: Negative for chest pain, leg swelling or palpitations.    Physical Exam:  Patient is a pleasant, cooperative, well developed 78 y.o.  adult male. The patient is alert and oriented to time, place and person.   Patient has normal affect and mood.    Examination of Both Lower Extremities:   Objective:   Vasc: DP and PT pulses are palpable bilateral.  CFT is less than 3 seconds bilateral.  Skin temperature is warm to cool proximal to distal bilateral.  +varicositites    Neuro: Vibratory, light touch and proprioception are intact bilateral.      Derm: Nails 1-5 bilateral are intact. HPK to left sub 4th met head. Debrided to smooth skin. Right fungal nail noted.    Ortho: Muscle strength is 5/5 for all pedal groups tested.     1. Left foot pain        2. Plantar porokeratosis, acquired        3. Fungal nail infection          Patient educated on proper foot care.  All hpk tissue was debrided to smooth skin  Keep filed down  Right foot notes fungal hallux  Continue topical   Fu prn  Patient was in agreement to this plan. All questions answered.      Rupa Hernandez DPM  [phone]  Option  2  Fax: [phone]

## 2024-05-14 ENCOUNTER — APPOINTMENT (OUTPATIENT)
Dept: RADIOLOGY | Facility: HOSPITAL | Age: 79
End: 2024-05-14
Payer: MEDICARE

## 2024-05-28 ENCOUNTER — TELEPHONE (OUTPATIENT)
Dept: PRIMARY CARE | Facility: CLINIC | Age: 79
End: 2024-05-28

## 2024-05-28 ENCOUNTER — APPOINTMENT (OUTPATIENT)
Dept: RADIOLOGY | Facility: HOSPITAL | Age: 79
DRG: 438 | End: 2024-05-28
Payer: MEDICARE

## 2024-05-28 ENCOUNTER — OFFICE VISIT (OUTPATIENT)
Dept: PRIMARY CARE | Facility: CLINIC | Age: 79
End: 2024-05-28
Payer: MEDICARE

## 2024-05-28 ENCOUNTER — HOSPITAL ENCOUNTER (OUTPATIENT)
Facility: HOSPITAL | Age: 79
Setting detail: OBSERVATION
Discharge: HOME | DRG: 438 | End: 2024-05-30
Attending: STUDENT IN AN ORGANIZED HEALTH CARE EDUCATION/TRAINING PROGRAM | Admitting: INTERNAL MEDICINE
Payer: MEDICARE

## 2024-05-28 ENCOUNTER — APPOINTMENT (OUTPATIENT)
Dept: CARDIOLOGY | Facility: HOSPITAL | Age: 79
DRG: 438 | End: 2024-05-28
Payer: MEDICARE

## 2024-05-28 VITALS
HEART RATE: 104 BPM | BODY MASS INDEX: 23.63 KG/M2 | OXYGEN SATURATION: 98 % | DIASTOLIC BLOOD PRESSURE: 62 MMHG | TEMPERATURE: 98.1 F | SYSTOLIC BLOOD PRESSURE: 102 MMHG | WEIGHT: 160 LBS

## 2024-05-28 DIAGNOSIS — R41.0 CONFUSION: ICD-10-CM

## 2024-05-28 DIAGNOSIS — R93.89 ABNORMAL CT SCAN: ICD-10-CM

## 2024-05-28 DIAGNOSIS — K92.1 MELENA: ICD-10-CM

## 2024-05-28 DIAGNOSIS — K21.9 GASTROESOPHAGEAL REFLUX DISEASE, UNSPECIFIED WHETHER ESOPHAGITIS PRESENT: Chronic | ICD-10-CM

## 2024-05-28 DIAGNOSIS — K85.90 ACUTE PANCREATITIS, UNSPECIFIED COMPLICATION STATUS, UNSPECIFIED PANCREATITIS TYPE (HHS-HCC): ICD-10-CM

## 2024-05-28 DIAGNOSIS — N30.90 CYSTITIS: ICD-10-CM

## 2024-05-28 DIAGNOSIS — R53.1 GENERALIZED WEAKNESS: Primary | ICD-10-CM

## 2024-05-28 DIAGNOSIS — K92.2 UGIB (UPPER GASTROINTESTINAL BLEED): Primary | ICD-10-CM

## 2024-05-28 DIAGNOSIS — R53.83 OTHER FATIGUE: ICD-10-CM

## 2024-05-28 LAB
ALBUMIN SERPL BCP-MCNC: 3.8 G/DL (ref 3.4–5)
ALP SERPL-CCNC: 138 U/L (ref 33–136)
ALT SERPL W P-5'-P-CCNC: 36 U/L (ref 10–52)
ANION GAP SERPL CALC-SCNC: 18 MMOL/L (ref 10–20)
APPEARANCE UR: ABNORMAL
AST SERPL W P-5'-P-CCNC: 64 U/L (ref 9–39)
BASOPHILS # BLD AUTO: 0.04 X10*3/UL (ref 0–0.1)
BASOPHILS NFR BLD AUTO: 0.6 %
BILIRUB SERPL-MCNC: 0.6 MG/DL (ref 0–1.2)
BILIRUB UR STRIP.AUTO-MCNC: NEGATIVE MG/DL
BNP SERPL-MCNC: 297 PG/ML (ref 0–99)
BUN SERPL-MCNC: 27 MG/DL (ref 6–23)
CALCIUM SERPL-MCNC: 8.8 MG/DL (ref 8.6–10.3)
CARDIAC TROPONIN I PNL SERPL HS: 34 NG/L (ref 0–20)
CARDIAC TROPONIN I PNL SERPL HS: 35 NG/L (ref 0–20)
CHLORIDE SERPL-SCNC: 102 MMOL/L (ref 98–107)
CO2 SERPL-SCNC: 21 MMOL/L (ref 21–32)
COLOR UR: YELLOW
CREAT SERPL-MCNC: 1.48 MG/DL (ref 0.5–1.3)
EGFRCR SERPLBLD CKD-EPI 2021: 48 ML/MIN/1.73M*2
EOSINOPHIL # BLD AUTO: 0.21 X10*3/UL (ref 0–0.4)
EOSINOPHIL NFR BLD AUTO: 3 %
ERYTHROCYTE [DISTWIDTH] IN BLOOD BY AUTOMATED COUNT: 17.1 % (ref 11.5–14.5)
GLUCOSE SERPL-MCNC: 93 MG/DL (ref 74–99)
GLUCOSE UR STRIP.AUTO-MCNC: NORMAL MG/DL
HCT VFR BLD AUTO: 30.7 % (ref 41–52)
HEMOCCULT SP1 STL QL: NEGATIVE
HGB BLD-MCNC: 9.8 G/DL (ref 13.5–17.5)
HYALINE CASTS #/AREA URNS AUTO: ABNORMAL /LPF
IMM GRANULOCYTES # BLD AUTO: 0.02 X10*3/UL (ref 0–0.5)
IMM GRANULOCYTES NFR BLD AUTO: 0.3 % (ref 0–0.9)
KETONES UR STRIP.AUTO-MCNC: ABNORMAL MG/DL
LEUKOCYTE ESTERASE UR QL STRIP.AUTO: NEGATIVE
LYMPHOCYTES # BLD AUTO: 1.27 X10*3/UL (ref 0.8–3)
LYMPHOCYTES NFR BLD AUTO: 18.1 %
MAGNESIUM SERPL-MCNC: 2.08 MG/DL (ref 1.6–2.4)
MCH RBC QN AUTO: 28.1 PG (ref 26–34)
MCHC RBC AUTO-ENTMCNC: 31.9 G/DL (ref 32–36)
MCV RBC AUTO: 88 FL (ref 80–100)
MONOCYTES # BLD AUTO: 0.72 X10*3/UL (ref 0.05–0.8)
MONOCYTES NFR BLD AUTO: 10.2 %
MUCOUS THREADS #/AREA URNS AUTO: ABNORMAL /LPF
NEUTROPHILS # BLD AUTO: 4.77 X10*3/UL (ref 1.6–5.5)
NEUTROPHILS NFR BLD AUTO: 67.8 %
NITRITE UR QL STRIP.AUTO: NEGATIVE
NRBC BLD-RTO: 0 /100 WBCS (ref 0–0)
PH UR STRIP.AUTO: 5.5 [PH]
PHOSPHATE SERPL-MCNC: 4.3 MG/DL (ref 2.5–4.9)
PLATELET # BLD AUTO: 158 X10*3/UL (ref 150–450)
POTASSIUM SERPL-SCNC: 4 MMOL/L (ref 3.5–5.3)
PROT SERPL-MCNC: 6.7 G/DL (ref 6.4–8.2)
PROT UR STRIP.AUTO-MCNC: ABNORMAL MG/DL
RBC # BLD AUTO: 3.49 X10*6/UL (ref 4.5–5.9)
RBC # UR STRIP.AUTO: NEGATIVE /UL
RBC #/AREA URNS AUTO: ABNORMAL /HPF
SODIUM SERPL-SCNC: 137 MMOL/L (ref 136–145)
SP GR UR STRIP.AUTO: 1.02
TSH SERPL-ACNC: 2.27 MIU/L (ref 0.44–3.98)
UROBILINOGEN UR STRIP.AUTO-MCNC: NORMAL MG/DL
WBC # BLD AUTO: 7 X10*3/UL (ref 4.4–11.3)
WBC #/AREA URNS AUTO: ABNORMAL /HPF

## 2024-05-28 PROCEDURE — 87086 URINE CULTURE/COLONY COUNT: CPT | Mod: GEALAB | Performed by: STUDENT IN AN ORGANIZED HEALTH CARE EDUCATION/TRAINING PROGRAM

## 2024-05-28 PROCEDURE — 36415 COLL VENOUS BLD VENIPUNCTURE: CPT | Performed by: STUDENT IN AN ORGANIZED HEALTH CARE EDUCATION/TRAINING PROGRAM

## 2024-05-28 PROCEDURE — 96375 TX/PRO/DX INJ NEW DRUG ADDON: CPT

## 2024-05-28 PROCEDURE — 99214 OFFICE O/P EST MOD 30 MIN: CPT | Performed by: FAMILY MEDICINE

## 2024-05-28 PROCEDURE — 1036F TOBACCO NON-USER: CPT | Performed by: FAMILY MEDICINE

## 2024-05-28 PROCEDURE — 1160F RVW MEDS BY RX/DR IN RCRD: CPT | Performed by: FAMILY MEDICINE

## 2024-05-28 PROCEDURE — 1123F ACP DISCUSS/DSCN MKR DOCD: CPT | Performed by: FAMILY MEDICINE

## 2024-05-28 PROCEDURE — 83880 ASSAY OF NATRIURETIC PEPTIDE: CPT | Performed by: STUDENT IN AN ORGANIZED HEALTH CARE EDUCATION/TRAINING PROGRAM

## 2024-05-28 PROCEDURE — 84484 ASSAY OF TROPONIN QUANT: CPT | Performed by: STUDENT IN AN ORGANIZED HEALTH CARE EDUCATION/TRAINING PROGRAM

## 2024-05-28 PROCEDURE — 2550000001 HC RX 255 CONTRASTS: Performed by: STUDENT IN AN ORGANIZED HEALTH CARE EDUCATION/TRAINING PROGRAM

## 2024-05-28 PROCEDURE — 99285 EMERGENCY DEPT VISIT HI MDM: CPT | Mod: 25

## 2024-05-28 PROCEDURE — 96361 HYDRATE IV INFUSION ADD-ON: CPT

## 2024-05-28 PROCEDURE — 3074F SYST BP LT 130 MM HG: CPT | Performed by: FAMILY MEDICINE

## 2024-05-28 PROCEDURE — 1125F AMNT PAIN NOTED PAIN PRSNT: CPT | Performed by: FAMILY MEDICINE

## 2024-05-28 PROCEDURE — 74174 CTA ABD&PLVS W/CONTRAST: CPT | Performed by: RADIOLOGY

## 2024-05-28 PROCEDURE — 3078F DIAST BP <80 MM HG: CPT | Performed by: FAMILY MEDICINE

## 2024-05-28 PROCEDURE — 1159F MED LIST DOCD IN RCRD: CPT | Performed by: FAMILY MEDICINE

## 2024-05-28 PROCEDURE — 71275 CT ANGIOGRAPHY CHEST: CPT | Performed by: RADIOLOGY

## 2024-05-28 PROCEDURE — 80053 COMPREHEN METABOLIC PANEL: CPT | Performed by: STUDENT IN AN ORGANIZED HEALTH CARE EDUCATION/TRAINING PROGRAM

## 2024-05-28 PROCEDURE — 83735 ASSAY OF MAGNESIUM: CPT | Performed by: STUDENT IN AN ORGANIZED HEALTH CARE EDUCATION/TRAINING PROGRAM

## 2024-05-28 PROCEDURE — 71046 X-RAY EXAM CHEST 2 VIEWS: CPT | Performed by: RADIOLOGY

## 2024-05-28 PROCEDURE — 2500000004 HC RX 250 GENERAL PHARMACY W/ HCPCS (ALT 636 FOR OP/ED): Performed by: STUDENT IN AN ORGANIZED HEALTH CARE EDUCATION/TRAINING PROGRAM

## 2024-05-28 PROCEDURE — 81001 URINALYSIS AUTO W/SCOPE: CPT | Performed by: STUDENT IN AN ORGANIZED HEALTH CARE EDUCATION/TRAINING PROGRAM

## 2024-05-28 PROCEDURE — 82270 OCCULT BLOOD FECES: CPT | Performed by: STUDENT IN AN ORGANIZED HEALTH CARE EDUCATION/TRAINING PROGRAM

## 2024-05-28 PROCEDURE — 1157F ADVNC CARE PLAN IN RCRD: CPT | Performed by: FAMILY MEDICINE

## 2024-05-28 PROCEDURE — 84443 ASSAY THYROID STIM HORMONE: CPT | Performed by: STUDENT IN AN ORGANIZED HEALTH CARE EDUCATION/TRAINING PROGRAM

## 2024-05-28 PROCEDURE — 84100 ASSAY OF PHOSPHORUS: CPT | Performed by: STUDENT IN AN ORGANIZED HEALTH CARE EDUCATION/TRAINING PROGRAM

## 2024-05-28 PROCEDURE — 85025 COMPLETE CBC W/AUTO DIFF WBC: CPT | Performed by: STUDENT IN AN ORGANIZED HEALTH CARE EDUCATION/TRAINING PROGRAM

## 2024-05-28 PROCEDURE — 71275 CT ANGIOGRAPHY CHEST: CPT

## 2024-05-28 PROCEDURE — 83690 ASSAY OF LIPASE: CPT | Performed by: STUDENT IN AN ORGANIZED HEALTH CARE EDUCATION/TRAINING PROGRAM

## 2024-05-28 PROCEDURE — 71046 X-RAY EXAM CHEST 2 VIEWS: CPT

## 2024-05-28 PROCEDURE — 96365 THER/PROPH/DIAG IV INF INIT: CPT

## 2024-05-28 PROCEDURE — 93005 ELECTROCARDIOGRAM TRACING: CPT

## 2024-05-28 PROCEDURE — 1158F ADVNC CARE PLAN TLK DOCD: CPT | Performed by: FAMILY MEDICINE

## 2024-05-28 RX ADMIN — HYDROMORPHONE HYDROCHLORIDE 0.5 MG: 1 INJECTION, SOLUTION INTRAMUSCULAR; INTRAVENOUS; SUBCUTANEOUS at 21:46

## 2024-05-28 RX ADMIN — SODIUM CHLORIDE, POTASSIUM CHLORIDE, SODIUM LACTATE AND CALCIUM CHLORIDE 1000 ML: 600; 310; 30; 20 INJECTION, SOLUTION INTRAVENOUS at 19:31

## 2024-05-28 RX ADMIN — IOHEXOL 90 ML: 350 INJECTION, SOLUTION INTRAVENOUS at 21:41

## 2024-05-28 ASSESSMENT — PATIENT HEALTH QUESTIONNAIRE - PHQ9
1. LITTLE INTEREST OR PLEASURE IN DOING THINGS: NOT AT ALL
SUM OF ALL RESPONSES TO PHQ9 QUESTIONS 1 AND 2: 0
2. FEELING DOWN, DEPRESSED OR HOPELESS: NOT AT ALL

## 2024-05-28 ASSESSMENT — PAIN - FUNCTIONAL ASSESSMENT: PAIN_FUNCTIONAL_ASSESSMENT: 0-10

## 2024-05-28 ASSESSMENT — PAIN SCALES - GENERAL
PAINLEVEL: 9
PAINLEVEL_OUTOF10: 0 - NO PAIN

## 2024-05-28 ASSESSMENT — COLUMBIA-SUICIDE SEVERITY RATING SCALE - C-SSRS
2. HAVE YOU ACTUALLY HAD ANY THOUGHTS OF KILLING YOURSELF?: NO
1. IN THE PAST MONTH, HAVE YOU WISHED YOU WERE DEAD OR WISHED YOU COULD GO TO SLEEP AND NOT WAKE UP?: NO
6. HAVE YOU EVER DONE ANYTHING, STARTED TO DO ANYTHING, OR PREPARED TO DO ANYTHING TO END YOUR LIFE?: NO
1. IN THE PAST MONTH, HAVE YOU WISHED YOU WERE DEAD OR WISHED YOU COULD GO TO SLEEP AND NOT WAKE UP?: NO
6. HAVE YOU EVER DONE ANYTHING, STARTED TO DO ANYTHING, OR PREPARED TO DO ANYTHING TO END YOUR LIFE?: NO
2. HAVE YOU ACTUALLY HAD ANY THOUGHTS OF KILLING YOURSELF?: NO

## 2024-05-28 ASSESSMENT — ENCOUNTER SYMPTOMS
OCCASIONAL FEELINGS OF UNSTEADINESS: 0
SHORTNESS OF BREATH: 1
COUGH: 1
FATIGUE: 1
DEPRESSION: 0
ALTERED MENTAL STATUS: 1
LOSS OF SENSATION IN FEET: 0

## 2024-05-28 ASSESSMENT — LIFESTYLE VARIABLES
AUDIT-C TOTAL SCORE: 0
HOW OFTEN DO YOU HAVE A DRINK CONTAINING ALCOHOL: NEVER
SKIP TO QUESTIONS 9-10: 1
HOW OFTEN DO YOU HAVE SIX OR MORE DRINKS ON ONE OCCASION: NEVER
HOW MANY STANDARD DRINKS CONTAINING ALCOHOL DO YOU HAVE ON A TYPICAL DAY: PATIENT DOES NOT DRINK

## 2024-05-28 NOTE — ED PROVIDER NOTES
CC: Weakness, Gen (Sent in from PCP to be worked up for weakness, confusion, diarrhea and paleness.  Concerned for bleeding.)     HPI:  Patient is a 78-year-old male with a past medical history as noted below. He is presenting to the emergency department from his primary care physician with 2 days of generalized weakness, confusion, lethargy persistent cough as well as diarrhea.  Primary physician notes that there has been intermittent black tarry stools no bright red blood per rectum.    On my evaluation patient is reporting intermittent episodes of weakness which wife reports are accompanied by confusion, intermittent episodes of generalized pain.  Patient reports that he is not previously had dark or tarry stools noted above, endorses no bright red blood per rectum, no hemoptysis or hematemesis.  He is otherwise denying any active chest pain, shortness of breath, nausea/vomiting, fevers/chills, focal numbness weakness bilateral arms, legs, face.    Records Reviewed:  Recent available ED and inpatient notes reviewed in EMR.    PMHx/PSHx:  Per HPI.   - has a past medical history of Anemia (2019), Arthritis (2019), GERD (gastroesophageal reflux disease) (2000), Headache (April 20, 2024), Hypertension (2000), Inflammatory bowel disease (2020), Osteoporosis, Personal history of other diseases of the digestive system, and Personal history of other diseases of the musculoskeletal system and connective tissue.  - has a past surgical history that includes Other surgical history (10/07/2019); Septoplasty (2023); Sinus surgery (Jan 2024); and [place] tooth extraction (Jan 2024).    Medications:  Reviewed in EMR. See EMR for complete list of medications and doses.    Allergies:  Amoxicillin-pot clavulanate, Erythromycin, Hydroxychloroquine, Levofloxacin, Nsaids (non-steroidal anti-inflammatory drug), and Sulfa (sulfonamide antibiotics)    Social History:  - Tobacco:  reports that he quit smoking about 43 years ago. His  smoking use included cigarettes. He started smoking about 49 years ago. He has a 5 pack-year smoking history. He has never used smokeless tobacco.   - Alcohol:  reports that he does not currently use alcohol after a past usage of about 10.0 standard drinks of alcohol per week.   - Illicit Drugs:  reports no history of drug use.     ROS:  Per HPI.       ???????????????????????????????????????????????????????????????  Triage Vitals:  T 36.2 °C (97.2 °F)  HR (!) 105  BP 94/64  RR 18  O2 97 %      Physical Exam  Vitals and nursing note reviewed.   Constitutional:       General: He is not in acute distress.     Appearance: Normal appearance. He is not toxic-appearing.   HENT:      Head: Normocephalic and atraumatic.      Nose: No congestion or rhinorrhea.      Mouth/Throat:      Mouth: Mucous membranes are moist.      Pharynx: Oropharynx is clear.   Eyes:      Extraocular Movements: Extraocular movements intact.      Conjunctiva/sclera: Conjunctivae normal.      Pupils: Pupils are equal, round, and reactive to light.   Cardiovascular:      Rate and Rhythm: Regular rhythm. Tachycardia present.      Pulses: Normal pulses.      Heart sounds: No murmur heard.     No friction rub. No gallop.   Pulmonary:      Effort: Pulmonary effort is normal.      Breath sounds: Normal breath sounds. No wheezing, rhonchi or rales.   Abdominal:      General: There is no distension.      Palpations: Abdomen is soft.      Tenderness: There is no abdominal tenderness. There is no guarding or rebound.   Musculoskeletal:         General: No swelling, deformity or signs of injury. Normal range of motion.      Right lower leg: No edema.      Left lower leg: No edema.   Skin:     General: Skin is warm.      Findings: No bruising, lesion or rash.   Neurological:      General: No focal deficit present.      Mental Status: He is alert and oriented to person, place, and time. Mental status is at baseline.      Cranial Nerves: No cranial nerve  deficit.      Sensory: No sensory deficit.      Coordination: Coordination normal.   Psychiatric:         Mood and Affect: Mood normal.         Thought Content: Thought content normal.         Judgment: Judgment normal.       ???????????????????????????????????????????????????????????????  EKG:  EKG is obtained at 1721 it is noted be normal sinus rhythm with a ventricular rate of 92 bpm, P interval 140, QRS duration 92, QTc slightly prolonged at 489 there are T wave inversions that are noted in lead V1, no significant T wave inversions noted, occasional PVCs noted, it is compared to prior EKG from October 28, 2022 with persistent T wave inversions in lead V1.  Overall interpretation is reassuring study with no signs of acute ischemia or infarction.    Assessment and Plan:  Patient is a 78-year-old male with past medical history as noted above presenting at recommendation of PCP for further evaluation.  Initial workup included basic labs, chest x-ray, urinalysis for potential causes of the patient's confusion.  Labs are reviewed without any significant arrangements, troponin stable at 35, 34, initially had discussion with patient and wife regarding dispo home with safe follow-up however patient had acute episode of significant chest pain radiating towards his back.  At this point decision was made to admit the patient however we will first obtain CT angiogram of the chest abdomen pelvis to evaluate for potential vascular pathology that may be causing this pain such as abdominal aortic aneurysm/aortic dissection.    Admission is pending based on this imaging.  Please see providers handoff note/attending attestation for interpretation of final radiologist reading context patient's clinical condition and patient's eventual disposition.  Regardless believe the patient will require admission given chest pain symptoms and multiple cardiac risk factors.        ED Course:  ED Course as of 05/30/24 0046   Tue May 28, 2024    1813 OCCULT BLOOD, STOOL X1: Negative [BN]   1813 HEMOGLOBIN(!): 9.8 [BN]      ED Course User Index  [BN] Don Patel MD         Diagnoses as of 05/30/24 0046   Generalized weakness   Cystitis   Abnormal CT scan   Acute pancreatitis, unspecified complication status, unspecified pancreatitis type ([facility])        Social Determinants Limiting Care:      Disposition:  Admit    Don Patel MD       Procedures ? SmartLinks last updated 5/28/2024 5:04 PM        Don Patel MD  Resident  05/30/24 0055

## 2024-05-28 NOTE — TELEPHONE ENCOUNTER
Saturday he started with feeling weak, cough, chest congestion, confusion at night. Difficulty getting around, today he was able to get out of bed but couldn't yesterday. She said he has a hx of pna and is concerned about that.

## 2024-05-28 NOTE — PROGRESS NOTES
Normal rate Subjective   Patient ID: Oren Rowland is a 78 y.o. male who presents for Cough, Altered Mental Status, Fatigue, and Shortness of Breath.    Cough  Associated symptoms include shortness of breath.   Altered Mental Status  Fatigue  Associated symptoms include coughing and fatigue.   Shortness of Breath        2 days  Weakness generalized   Confusion   Lethargy  Cough without fevers  Diarrhea seeing gi  Using 8 Excedrin a day  Also on prednisone from Dr. Vera  Has intermittent black tarry stools, denies any bright red blood in his stool.  Normally his stools are brown    Review of Systems   Constitutional:  Positive for fatigue.   Respiratory:  Positive for cough and shortness of breath.        Objective   /62   Pulse 104   Temp 36.7 °C (98.1 °F) (Temporal)   Wt 72.6 kg (160 lb)   SpO2 98%   BMI 23.63 kg/m²     Physical Exam  Vitals reviewed.   Constitutional:       Appearance: Normal appearance.   Cardiovascular:      Rate and Rhythm: Normal rate and regular rhythm.      Heart sounds: Normal heart sounds. No murmur heard.  Pulmonary:      Breath sounds: Normal breath sounds.   Abdominal:      General: Abdomen is flat. Bowel sounds are normal.      Palpations: Abdomen is soft.   Musculoskeletal:         General: No swelling.   Neurological:      Mental Status: He is alert.     Pale on exam  Decreased capillary refill  Nailbeds appear pale  Slightly tachycardic noted on exam    Assessment/Plan   Diagnoses and all orders for this visit:  UGIB (upper gastrointestinal bleed)  Confusion  Other fatigue    Likely slow bleed from the chronic use of Excedrin daily  Discussed with patient and wife  Needs ED evaluation  Case was discussed with the ER physician  Feel safe if the patient can be travel by car  Patient is instructed to go directly to the emergency department  Patient was transported to his car in a wheelchair  Needs labs done  He is to stop taking the Excedrin  Await further ED workup

## 2024-05-29 PROBLEM — R53.1 GENERALIZED WEAKNESS: Status: ACTIVE | Noted: 2024-05-29

## 2024-05-29 LAB
ANION GAP SERPL CALC-SCNC: 15 MMOL/L (ref 10–20)
ATRIAL RATE: 92 BPM
BUN SERPL-MCNC: 30 MG/DL (ref 6–23)
CALCIUM SERPL-MCNC: 7.5 MG/DL (ref 8.6–10.3)
CHLORIDE SERPL-SCNC: 102 MMOL/L (ref 98–107)
CO2 SERPL-SCNC: 19 MMOL/L (ref 21–32)
CREAT SERPL-MCNC: 1.47 MG/DL (ref 0.5–1.3)
EGFRCR SERPLBLD CKD-EPI 2021: 49 ML/MIN/1.73M*2
ERYTHROCYTE [DISTWIDTH] IN BLOOD BY AUTOMATED COUNT: 16.6 % (ref 11.5–14.5)
GLUCOSE SERPL-MCNC: 68 MG/DL (ref 74–99)
HCT VFR BLD AUTO: 25.9 % (ref 41–52)
HGB BLD-MCNC: 8.3 G/DL (ref 13.5–17.5)
HOLD SPECIMEN: NORMAL
LIPASE SERPL-CCNC: 234 U/L (ref 9–82)
LIPASE SERPL-CCNC: 245 U/L (ref 9–82)
LIPASE SERPL-CCNC: 256 U/L (ref 9–82)
MCH RBC QN AUTO: 28 PG (ref 26–34)
MCHC RBC AUTO-ENTMCNC: 32 G/DL (ref 32–36)
MCV RBC AUTO: 88 FL (ref 80–100)
NRBC BLD-RTO: 0 /100 WBCS (ref 0–0)
P AXIS: 80 DEGREES
P OFFSET: 201 MS
P ONSET: 153 MS
PLATELET # BLD AUTO: 119 X10*3/UL (ref 150–450)
POTASSIUM SERPL-SCNC: 3.5 MMOL/L (ref 3.5–5.3)
PR INTERVAL: 140 MS
Q ONSET: 223 MS
QRS COUNT: 15 BEATS
QRS DURATION: 92 MS
QT INTERVAL: 396 MS
QTC CALCULATION(BAZETT): 489 MS
QTC FREDERICIA: 456 MS
R AXIS: -6 DEGREES
RBC # BLD AUTO: 2.96 X10*6/UL (ref 4.5–5.9)
SODIUM SERPL-SCNC: 132 MMOL/L (ref 136–145)
T AXIS: 53 DEGREES
T OFFSET: 421 MS
VENTRICULAR RATE: 92 BPM
WBC # BLD AUTO: 6.2 X10*3/UL (ref 4.4–11.3)

## 2024-05-29 PROCEDURE — G0378 HOSPITAL OBSERVATION PER HR: HCPCS

## 2024-05-29 PROCEDURE — 83690 ASSAY OF LIPASE: CPT | Performed by: INTERNAL MEDICINE

## 2024-05-29 PROCEDURE — 36415 COLL VENOUS BLD VENIPUNCTURE: CPT | Performed by: INTERNAL MEDICINE

## 2024-05-29 PROCEDURE — 97165 OT EVAL LOW COMPLEX 30 MIN: CPT | Mod: GO

## 2024-05-29 PROCEDURE — 97530 THERAPEUTIC ACTIVITIES: CPT | Mod: GO

## 2024-05-29 PROCEDURE — 83690 ASSAY OF LIPASE: CPT | Mod: 91 | Performed by: STUDENT IN AN ORGANIZED HEALTH CARE EDUCATION/TRAINING PROGRAM

## 2024-05-29 PROCEDURE — 2500000004 HC RX 250 GENERAL PHARMACY W/ HCPCS (ALT 636 FOR OP/ED): Performed by: INTERNAL MEDICINE

## 2024-05-29 PROCEDURE — 85027 COMPLETE CBC AUTOMATED: CPT | Performed by: INTERNAL MEDICINE

## 2024-05-29 PROCEDURE — 2500000001 HC RX 250 WO HCPCS SELF ADMINISTERED DRUGS (ALT 637 FOR MEDICARE OP)

## 2024-05-29 PROCEDURE — 80048 BASIC METABOLIC PNL TOTAL CA: CPT | Performed by: INTERNAL MEDICINE

## 2024-05-29 PROCEDURE — 2500000004 HC RX 250 GENERAL PHARMACY W/ HCPCS (ALT 636 FOR OP/ED): Performed by: STUDENT IN AN ORGANIZED HEALTH CARE EDUCATION/TRAINING PROGRAM

## 2024-05-29 PROCEDURE — 2500000001 HC RX 250 WO HCPCS SELF ADMINISTERED DRUGS (ALT 637 FOR MEDICARE OP): Performed by: INTERNAL MEDICINE

## 2024-05-29 PROCEDURE — 99223 1ST HOSP IP/OBS HIGH 75: CPT | Performed by: INTERNAL MEDICINE

## 2024-05-29 PROCEDURE — 1100000001 HC PRIVATE ROOM DAILY

## 2024-05-29 RX ORDER — ACETAMINOPHEN 650 MG/1
650 SUPPOSITORY RECTAL EVERY 4 HOURS PRN
Status: DISCONTINUED | OUTPATIENT
Start: 2024-05-29 | End: 2024-05-30 | Stop reason: HOSPADM

## 2024-05-29 RX ORDER — ACETAMINOPHEN 160 MG/5ML
650 SOLUTION ORAL EVERY 4 HOURS PRN
Status: DISCONTINUED | OUTPATIENT
Start: 2024-05-29 | End: 2024-05-30 | Stop reason: HOSPADM

## 2024-05-29 RX ORDER — GUAIFENESIN/DEXTROMETHORPHAN 100-10MG/5
5 SYRUP ORAL EVERY 4 HOURS PRN
Status: DISCONTINUED | OUTPATIENT
Start: 2024-05-29 | End: 2024-05-30 | Stop reason: HOSPADM

## 2024-05-29 RX ORDER — ALUMINUM HYDROXIDE, MAGNESIUM HYDROXIDE, AND SIMETHICONE 1200; 120; 1200 MG/30ML; MG/30ML; MG/30ML
30 SUSPENSION ORAL EVERY 6 HOURS PRN
Status: DISCONTINUED | OUTPATIENT
Start: 2024-05-29 | End: 2024-05-30 | Stop reason: HOSPADM

## 2024-05-29 RX ORDER — AZATHIOPRINE 50 MG/1
50 TABLET ORAL 2 TIMES DAILY
Status: DISCONTINUED | OUTPATIENT
Start: 2024-05-29 | End: 2024-05-30 | Stop reason: HOSPADM

## 2024-05-29 RX ORDER — ENOXAPARIN SODIUM 100 MG/ML
40 INJECTION SUBCUTANEOUS EVERY 24 HOURS
Status: DISCONTINUED | OUTPATIENT
Start: 2024-05-29 | End: 2024-05-30 | Stop reason: HOSPADM

## 2024-05-29 RX ORDER — LISINOPRIL 10 MG/1
10 TABLET ORAL DAILY
Status: DISCONTINUED | OUTPATIENT
Start: 2024-05-29 | End: 2024-05-30 | Stop reason: HOSPADM

## 2024-05-29 RX ORDER — CEPHALEXIN 500 MG/1
500 CAPSULE ORAL ONCE
Status: DISCONTINUED | OUTPATIENT
Start: 2024-05-29 | End: 2024-05-29

## 2024-05-29 RX ORDER — ONDANSETRON HYDROCHLORIDE 2 MG/ML
4 INJECTION, SOLUTION INTRAVENOUS EVERY 8 HOURS PRN
Status: DISCONTINUED | OUTPATIENT
Start: 2024-05-29 | End: 2024-05-30 | Stop reason: HOSPADM

## 2024-05-29 RX ORDER — ONDANSETRON 4 MG/1
4 TABLET, FILM COATED ORAL EVERY 8 HOURS PRN
Status: DISCONTINUED | OUTPATIENT
Start: 2024-05-29 | End: 2024-05-30 | Stop reason: HOSPADM

## 2024-05-29 RX ORDER — CEFTRIAXONE 1 G/50ML
1 INJECTION, SOLUTION INTRAVENOUS ONCE
Status: COMPLETED | OUTPATIENT
Start: 2024-05-29 | End: 2024-05-29

## 2024-05-29 RX ORDER — DOCUSATE SODIUM 100 MG/1
100 CAPSULE, LIQUID FILLED ORAL 2 TIMES DAILY
Status: DISCONTINUED | OUTPATIENT
Start: 2024-05-29 | End: 2024-05-29

## 2024-05-29 RX ORDER — PANTOPRAZOLE SODIUM 40 MG/1
40 TABLET, DELAYED RELEASE ORAL
Status: DISCONTINUED | OUTPATIENT
Start: 2024-05-29 | End: 2024-05-30 | Stop reason: HOSPADM

## 2024-05-29 RX ORDER — BACITRACIN ZINC 500 UNIT/G
OINTMENT IN PACKET (EA) TOPICAL
Status: COMPLETED
Start: 2024-05-29 | End: 2024-05-29

## 2024-05-29 RX ORDER — CEFTRIAXONE 1 G/50ML
1 INJECTION, SOLUTION INTRAVENOUS EVERY 24 HOURS
Status: DISCONTINUED | OUTPATIENT
Start: 2024-05-29 | End: 2024-05-30

## 2024-05-29 RX ORDER — CALCIUM CARBONATE 200(500)MG
500 TABLET,CHEWABLE ORAL 4 TIMES DAILY PRN
Status: DISCONTINUED | OUTPATIENT
Start: 2024-05-29 | End: 2024-05-30 | Stop reason: HOSPADM

## 2024-05-29 RX ORDER — GUAIFENESIN 600 MG/1
600 TABLET, EXTENDED RELEASE ORAL EVERY 12 HOURS PRN
Status: DISCONTINUED | OUTPATIENT
Start: 2024-05-29 | End: 2024-05-30 | Stop reason: HOSPADM

## 2024-05-29 RX ORDER — CHOLECALCIFEROL (VITAMIN D3) 125 MCG
5000 CAPSULE ORAL DAILY
Status: DISCONTINUED | OUTPATIENT
Start: 2024-05-29 | End: 2024-05-30 | Stop reason: HOSPADM

## 2024-05-29 RX ORDER — SODIUM CHLORIDE 9 MG/ML
100 INJECTION, SOLUTION INTRAVENOUS CONTINUOUS
Status: DISCONTINUED | OUTPATIENT
Start: 2024-05-29 | End: 2024-05-30 | Stop reason: HOSPADM

## 2024-05-29 RX ORDER — BACITRACIN ZINC 500 UNIT/G
1 OINTMENT IN PACKET (EA) TOPICAL ONCE
Status: COMPLETED | OUTPATIENT
Start: 2024-05-29 | End: 2024-05-29

## 2024-05-29 RX ORDER — ACETAMINOPHEN 325 MG/1
650 TABLET ORAL EVERY 4 HOURS PRN
Status: DISCONTINUED | OUTPATIENT
Start: 2024-05-29 | End: 2024-05-30 | Stop reason: HOSPADM

## 2024-05-29 RX ORDER — ACETAMINOPHEN 325 MG/1
975 TABLET ORAL ONCE
Status: COMPLETED | OUTPATIENT
Start: 2024-05-29 | End: 2024-05-29

## 2024-05-29 RX ORDER — CEPHALEXIN 500 MG/1
500 CAPSULE ORAL 3 TIMES DAILY
Qty: 30 CAPSULE | Refills: 0 | Status: SHIPPED | OUTPATIENT
Start: 2024-05-29 | End: 2024-05-30 | Stop reason: HOSPADM

## 2024-05-29 RX ADMIN — AZATHIOPRINE 50 MG: 50 TABLET ORAL at 20:45

## 2024-05-29 RX ADMIN — CEFTRIAXONE SODIUM 1 G: 1 INJECTION, SOLUTION INTRAVENOUS at 02:16

## 2024-05-29 RX ADMIN — ACETAMINOPHEN 650 MG: 325 TABLET ORAL at 20:55

## 2024-05-29 RX ADMIN — AZATHIOPRINE 50 MG: 50 TABLET ORAL at 08:31

## 2024-05-29 RX ADMIN — SODIUM CHLORIDE 100 ML/HR: 9 INJECTION, SOLUTION INTRAVENOUS at 18:28

## 2024-05-29 RX ADMIN — ACETAMINOPHEN 975 MG: 325 TABLET ORAL at 02:16

## 2024-05-29 RX ADMIN — CEFTRIAXONE SODIUM 1 G: 1 INJECTION, SOLUTION INTRAVENOUS at 20:45

## 2024-05-29 RX ADMIN — ENOXAPARIN SODIUM 40 MG: 40 INJECTION SUBCUTANEOUS at 08:30

## 2024-05-29 RX ADMIN — DOCUSATE SODIUM 100 MG: 100 CAPSULE, LIQUID FILLED ORAL at 08:30

## 2024-05-29 RX ADMIN — SODIUM CHLORIDE 100 ML/HR: 9 INJECTION, SOLUTION INTRAVENOUS at 08:31

## 2024-05-29 RX ADMIN — Medication 125 MCG: at 08:30

## 2024-05-29 RX ADMIN — Medication 1 APPLICATION: at 02:22

## 2024-05-29 RX ADMIN — BACITRACIN 1 APPLICATION: 500 OINTMENT TOPICAL at 02:22

## 2024-05-29 RX ADMIN — PANTOPRAZOLE SODIUM 40 MG: 40 TABLET, DELAYED RELEASE ORAL at 06:56

## 2024-05-29 RX ADMIN — ACETAMINOPHEN 650 MG: 325 TABLET ORAL at 16:45

## 2024-05-29 RX ADMIN — SODIUM CHLORIDE 1000 ML: 9 INJECTION, SOLUTION INTRAVENOUS at 02:16

## 2024-05-29 SDOH — HEALTH STABILITY: MENTAL HEALTH
HOW OFTEN DO YOU NEED TO HAVE SOMEONE HELP YOU WHEN YOU READ INSTRUCTIONS, PAMPHLETS, OR OTHER WRITTEN MATERIAL FROM YOUR DOCTOR OR PHARMACY?: PATIENT DECLINES TO RESPOND

## 2024-05-29 SDOH — ECONOMIC STABILITY: HOUSING INSECURITY
IN THE LAST 12 MONTHS, WAS THERE A TIME WHEN YOU DID NOT HAVE A STEADY PLACE TO SLEEP OR SLEPT IN A SHELTER (INCLUDING NOW)?: PATIENT DECLINED

## 2024-05-29 SDOH — SOCIAL STABILITY: SOCIAL INSECURITY: DO YOU FEEL ANYONE HAS EXPLOITED OR TAKEN ADVANTAGE OF YOU FINANCIALLY OR OF YOUR PERSONAL PROPERTY?: NO

## 2024-05-29 SDOH — SOCIAL STABILITY: SOCIAL NETWORK: IN A TYPICAL WEEK, HOW MANY TIMES DO YOU TALK ON THE PHONE WITH FAMILY, FRIENDS, OR NEIGHBORS?: PATIENT DECLINED

## 2024-05-29 SDOH — ECONOMIC STABILITY: INCOME INSECURITY
IN THE PAST 12 MONTHS, HAS THE ELECTRIC, GAS, OIL, OR WATER COMPANY THREATENED TO SHUT OFF SERVICE IN YOUR HOME?: PATIENT DECLINED

## 2024-05-29 SDOH — HEALTH STABILITY: PHYSICAL HEALTH: ON AVERAGE, HOW MANY MINUTES DO YOU ENGAGE IN EXERCISE AT THIS LEVEL?: 20 MIN

## 2024-05-29 SDOH — ECONOMIC STABILITY: FOOD INSECURITY: WITHIN THE PAST 12 MONTHS, YOU WORRIED THAT YOUR FOOD WOULD RUN OUT BEFORE YOU GOT MONEY TO BUY MORE.: NEVER TRUE

## 2024-05-29 SDOH — ECONOMIC STABILITY: FOOD INSECURITY: WITHIN THE PAST 12 MONTHS, THE FOOD YOU BOUGHT JUST DIDN'T LAST AND YOU DIDN'T HAVE MONEY TO GET MORE.: NEVER TRUE

## 2024-05-29 SDOH — ECONOMIC STABILITY: HOUSING INSECURITY: IN THE LAST 12 MONTHS, HOW MANY PLACES HAVE YOU LIVED?: 1

## 2024-05-29 SDOH — SOCIAL STABILITY: SOCIAL INSECURITY: ARE THERE ANY APPARENT SIGNS OF INJURIES/BEHAVIORS THAT COULD BE RELATED TO ABUSE/NEGLECT?: NO

## 2024-05-29 SDOH — HEALTH STABILITY: PHYSICAL HEALTH: ON AVERAGE, HOW MANY DAYS PER WEEK DO YOU ENGAGE IN MODERATE TO STRENUOUS EXERCISE (LIKE A BRISK WALK)?: 7 DAYS

## 2024-05-29 SDOH — HEALTH STABILITY: MENTAL HEALTH
STRESS IS WHEN SOMEONE FEELS TENSE, NERVOUS, ANXIOUS, OR CAN'T SLEEP AT NIGHT BECAUSE THEIR MIND IS TROUBLED. HOW STRESSED ARE YOU?: PATIENT DECLINED

## 2024-05-29 SDOH — SOCIAL STABILITY: SOCIAL INSECURITY
WITHIN THE LAST YEAR, HAVE YOU BEEN KICKED, HIT, SLAPPED, OR OTHERWISE PHYSICALLY HURT BY YOUR PARTNER OR EX-PARTNER?: PATIENT DECLINED

## 2024-05-29 SDOH — ECONOMIC STABILITY: HOUSING INSECURITY

## 2024-05-29 SDOH — SOCIAL STABILITY: SOCIAL NETWORK: HOW OFTEN DO YOU ATTENT MEETINGS OF THE CLUB OR ORGANIZATION YOU BELONG TO?: PATIENT DECLINED

## 2024-05-29 SDOH — ECONOMIC STABILITY: GENERAL

## 2024-05-29 SDOH — SOCIAL STABILITY: SOCIAL NETWORK
DO YOU BELONG TO ANY CLUBS OR ORGANIZATIONS SUCH AS CHURCH GROUPS UNIONS, FRATERNAL OR ATHLETIC GROUPS, OR SCHOOL GROUPS?: PATIENT DECLINED

## 2024-05-29 SDOH — SOCIAL STABILITY: SOCIAL NETWORK: ARE YOU MARRIED, WIDOWED, DIVORCED, SEPARATED, NEVER MARRIED, OR LIVING WITH A PARTNER?: PATIENT DECLINED

## 2024-05-29 SDOH — SOCIAL STABILITY: SOCIAL INSECURITY: WITHIN THE LAST YEAR, HAVE YOU BEEN AFRAID OF YOUR PARTNER OR EX-PARTNER?: PATIENT DECLINED

## 2024-05-29 SDOH — ECONOMIC STABILITY: FOOD INSECURITY: WITHIN THE PAST 12 MONTHS, YOU WORRIED THAT YOUR FOOD WOULD RUN OUT BEFORE YOU GOT THE MONEY TO BUY MORE.: NEVER TRUE

## 2024-05-29 SDOH — SOCIAL STABILITY: SOCIAL INSECURITY
WITHIN THE LAST YEAR, HAVE YOU BEEN HUMILIATED OR EMOTIONALLY ABUSED IN OTHER WAYS BY YOUR PARTNER OR EX-PARTNER?: PATIENT DECLINED

## 2024-05-29 SDOH — ECONOMIC STABILITY: FOOD INSECURITY: WITHIN THE PAST 12 MONTHS, THE FOOD YOU BOUGHT JUST DIDN'T LAST AND YOU DIDN'T HAVE MONEY TO GET MORE.: PATIENT DECLINED

## 2024-05-29 SDOH — SOCIAL STABILITY: SOCIAL NETWORK: HOW OFTEN DO YOU GET TOGETHER WITH FRIENDS OR RELATIVES?: PATIENT DECLINED

## 2024-05-29 SDOH — SOCIAL STABILITY: SOCIAL NETWORK: HOW OFTEN DO YOU ATTEND CHURCH OR RELIGIOUS SERVICES?: PATIENT DECLINED

## 2024-05-29 SDOH — ECONOMIC STABILITY: HOUSING INSECURITY
IN THE LAST 12 MONTHS, WAS THERE A TIME WHEN YOU DID NOT HAVE A STEADY PLACE TO SLEEP OR SLEPT IN A SHELTER (INCLUDING NOW)?: NO

## 2024-05-29 SDOH — SOCIAL STABILITY: SOCIAL INSECURITY: HAVE YOU HAD ANY THOUGHTS OF HARMING ANYONE ELSE?: NO

## 2024-05-29 SDOH — ECONOMIC STABILITY: FOOD INSECURITY: WITHIN THE PAST 12 MONTHS, YOU WORRIED THAT YOUR FOOD WOULD RUN OUT BEFORE YOU GOT MONEY TO BUY MORE.: PATIENT DECLINED

## 2024-05-29 SDOH — SOCIAL STABILITY: SOCIAL INSECURITY
WITHIN THE LAST YEAR, HAVE TO BEEN RAPED OR FORCED TO HAVE ANY KIND OF SEXUAL ACTIVITY BY YOUR PARTNER OR EX-PARTNER?: PATIENT DECLINED

## 2024-05-29 SDOH — SOCIAL STABILITY: SOCIAL INSECURITY: HAS ANYONE EVER THREATENED TO HURT YOUR FAMILY OR YOUR PETS?: NO

## 2024-05-29 SDOH — SOCIAL STABILITY: SOCIAL INSECURITY: DOES ANYONE TRY TO KEEP YOU FROM HAVING/CONTACTING OTHER FRIENDS OR DOING THINGS OUTSIDE YOUR HOME?: NO

## 2024-05-29 SDOH — ECONOMIC STABILITY: INCOME INSECURITY: HOW HARD IS IT FOR YOU TO PAY FOR THE VERY BASICS LIKE FOOD, HOUSING, MEDICAL CARE, AND HEATING?: PATIENT DECLINED

## 2024-05-29 SDOH — ECONOMIC STABILITY: TRANSPORTATION INSECURITY
IN THE PAST 12 MONTHS, HAS THE LACK OF TRANSPORTATION KEPT YOU FROM MEDICAL APPOINTMENTS OR FROM GETTING MEDICATIONS?: PATIENT DECLINED

## 2024-05-29 SDOH — ECONOMIC STABILITY: TRANSPORTATION INSECURITY
IN THE PAST 12 MONTHS, HAS LACK OF TRANSPORTATION KEPT YOU FROM MEETINGS, WORK, OR FROM GETTING THINGS NEEDED FOR DAILY LIVING?: PATIENT DECLINED

## 2024-05-29 SDOH — ECONOMIC STABILITY: HOUSING INSECURITY: IN THE PAST 12 MONTHS HAS THE ELECTRIC, GAS, OIL, OR WATER COMPANY THREATENED TO SHUT OFF SERVICES IN YOUR HOME?: NO

## 2024-05-29 SDOH — ECONOMIC STABILITY: TRANSPORTATION INSECURITY: IN THE PAST 12 MONTHS, HAS LACK OF TRANSPORTATION KEPT YOU FROM MEDICAL APPOINTMENTS OR FROM GETTING MEDICATIONS?: NO

## 2024-05-29 SDOH — ECONOMIC STABILITY: FOOD INSECURITY

## 2024-05-29 SDOH — ECONOMIC STABILITY: INCOME INSECURITY: IN THE LAST 12 MONTHS, WAS THERE A TIME WHEN YOU WERE NOT ABLE TO PAY THE MORTGAGE OR RENT ON TIME?: NO

## 2024-05-29 SDOH — SOCIAL STABILITY: SOCIAL INSECURITY: ARE YOU OR HAVE YOU BEEN THREATENED OR ABUSED PHYSICALLY, EMOTIONALLY, OR SEXUALLY BY ANYONE?: NO

## 2024-05-29 SDOH — SOCIAL STABILITY: SOCIAL INSECURITY: ABUSE: ADULT

## 2024-05-29 SDOH — SOCIAL STABILITY: SOCIAL INSECURITY: DO YOU FEEL UNSAFE GOING BACK TO THE PLACE WHERE YOU ARE LIVING?: NO

## 2024-05-29 SDOH — ECONOMIC STABILITY: TRANSPORTATION INSECURITY
IN THE PAST 12 MONTHS, HAS THE LACK OF TRANSPORTATION KEPT YOU FROM MEDICAL APPOINTMENTS OR FROM GETTING MEDICATIONS?: NO

## 2024-05-29 SDOH — ECONOMIC STABILITY: FOOD INSECURITY: WITHIN THE PAST 12 MONTHS, THE FOOD YOU BOUGHT JUST DIDN’T LAST AND YOU DIDN’T HAVE MONEY TO GET MORE.: NEVER TRUE

## 2024-05-29 SDOH — ECONOMIC STABILITY: INCOME INSECURITY: IN THE LAST 12 MONTHS, WAS THERE A TIME WHEN YOU WERE NOT ABLE TO PAY THE MORTGAGE OR RENT ON TIME?: PATIENT DECLINED

## 2024-05-29 SDOH — SOCIAL STABILITY: SOCIAL INSECURITY: WERE YOU ABLE TO COMPLETE ALL THE BEHAVIORAL HEALTH SCREENINGS?: YES

## 2024-05-29 SDOH — SOCIAL STABILITY: SOCIAL INSECURITY: HAVE YOU HAD THOUGHTS OF HARMING ANYONE ELSE?: NO

## 2024-05-29 SDOH — ECONOMIC STABILITY: TRANSPORTATION INSECURITY
IN THE PAST 12 MONTHS, HAS LACK OF TRANSPORTATION KEPT YOU FROM MEETINGS, WORK, OR FROM GETTING THINGS NEEDED FOR DAILY LIVING?: NO

## 2024-05-29 SDOH — ECONOMIC STABILITY: TRANSPORTATION INSECURITY

## 2024-05-29 SDOH — ECONOMIC STABILITY: HOUSING INSECURITY: IN THE LAST 12 MONTHS, WAS THERE A TIME WHEN YOU WERE NOT ABLE TO PAY THE MORTGAGE OR RENT ON TIME?: NO

## 2024-05-29 ASSESSMENT — ACTIVITIES OF DAILY LIVING (ADL)
BATHING_ASSISTANCE: STAND BY
HEARING - RIGHT EAR: FUNCTIONAL
JUDGMENT_ADEQUATE_SAFELY_COMPLETE_DAILY_ACTIVITIES: YES
HEARING - LEFT EAR: FUNCTIONAL
FEEDING YOURSELF: INDEPENDENT
ADEQUATE_TO_COMPLETE_ADL: YES
LACK_OF_TRANSPORTATION: NO
GROOMING: INDEPENDENT
ASSISTIVE_DEVICE: EYEGLASSES;DENTURES UPPER;DENTURES PARTIAL
PATIENT'S MEMORY ADEQUATE TO SAFELY COMPLETE DAILY ACTIVITIES?: YES
WALKS IN HOME: INDEPENDENT
LACK_OF_TRANSPORTATION: NO
DRESSING YOURSELF: INDEPENDENT
TOILETING: INDEPENDENT
ADL_ASSISTANCE: INDEPENDENT
BATHING: INDEPENDENT

## 2024-05-29 ASSESSMENT — COGNITIVE AND FUNCTIONAL STATUS - GENERAL
DAILY ACTIVITIY SCORE: 24
DAILY ACTIVITIY SCORE: 22
MOBILITY SCORE: 22
TOILETING: A LITTLE
PATIENT BASELINE BEDBOUND: NO
HELP NEEDED FOR BATHING: A LITTLE
DAILY ACTIVITIY SCORE: 24
CLIMB 3 TO 5 STEPS WITH RAILING: A LITTLE
WALKING IN HOSPITAL ROOM: A LITTLE
MOBILITY SCORE: 22
WALKING IN HOSPITAL ROOM: A LITTLE
CLIMB 3 TO 5 STEPS WITH RAILING: A LITTLE

## 2024-05-29 ASSESSMENT — LIFESTYLE VARIABLES
PRESCIPTION_ABUSE_PAST_12_MONTHS: NO
HOW OFTEN DO YOU HAVE A DRINK CONTAINING ALCOHOL: NEVER
SUBSTANCE_ABUSE_PAST_12_MONTHS: NO
AUDIT-C TOTAL SCORE: 0
AUDIT-C TOTAL SCORE: 0
HOW MANY STANDARD DRINKS CONTAINING ALCOHOL DO YOU HAVE ON A TYPICAL DAY: PATIENT DOES NOT DRINK
SKIP TO QUESTIONS 9-10: 1
HOW OFTEN DO YOU HAVE 6 OR MORE DRINKS ON ONE OCCASION: NEVER

## 2024-05-29 ASSESSMENT — SOCIAL DETERMINANTS OF HEALTH (SDOH): IN THE PAST 12 MONTHS, HAS THE ELECTRIC, GAS, OIL, OR WATER COMPANY THREATENED TO SHUT OFF SERVICE IN YOUR HOME?: NO

## 2024-05-29 ASSESSMENT — PAIN - FUNCTIONAL ASSESSMENT
PAIN_FUNCTIONAL_ASSESSMENT: 0-10

## 2024-05-29 ASSESSMENT — PAIN SCALES - GENERAL
PAINLEVEL_OUTOF10: 3
PAINLEVEL_OUTOF10: 0 - NO PAIN
PAINLEVEL_OUTOF10: 0 - NO PAIN
PAINLEVEL_OUTOF10: 9
PAINLEVEL_OUTOF10: 0 - NO PAIN
PAINLEVEL_OUTOF10: 0 - NO PAIN

## 2024-05-29 ASSESSMENT — ENCOUNTER SYMPTOMS
COUGH: 1
CONFUSION: 1
FEVER: 1
ACTIVITY CHANGE: 1
FATIGUE: 1
DIARRHEA: 1

## 2024-05-29 ASSESSMENT — PATIENT HEALTH QUESTIONNAIRE - PHQ9
1. LITTLE INTEREST OR PLEASURE IN DOING THINGS: NOT AT ALL
SUM OF ALL RESPONSES TO PHQ9 QUESTIONS 1 & 2: 0
2. FEELING DOWN, DEPRESSED OR HOPELESS: NOT AT ALL

## 2024-05-29 NOTE — PROGRESS NOTES
05/29/24 0947   Discharge Planning   Living Arrangements Spouse/significant other   Support Systems Spouse/significant other   Assistance Needed X3, independent in ADLs, ambulates without assistive devices, drives, No O2, CPAP or Bipap at baseline   Type of Residence Private residence   Do you have animals or pets at home? No   Who is requesting discharge planning? Patient   Home or Post Acute Services None   Patient expects to be discharged to: Home, No needs-denied need for HHC   Does the patient need discharge transport arranged? No   RoundTrip coordination needed? No   Has discharge transport been arranged? No   Financial Resource Strain   How hard is it for you to pay for the very basics like food, housing, medical care, and heating? Not very   Housing Stability   In the last 12 months, was there a time when you were not able to pay the mortgage or rent on time? N   In the last 12 months, how many places have you lived? 1   In the last 12 months, was there a time when you did not have a steady place to sleep or slept in a shelter (including now)? N   Transportation Needs   In the past 12 months, has lack of transportation kept you from medical appointments or from getting medications? no   In the past 12 months, has lack of transportation kept you from meetings, work, or from getting things needed for daily living? No

## 2024-05-29 NOTE — PROGRESS NOTES
For full history, physical exam, and prior hospital course, please see previous ED provider note. This is in addition to the primary record.    Comments/Additional Findings: Patient was signed out to me by Dr Carter at change of shift.   Pending items at this time include CT result.    CT shows 1. No thoracic or abdominal aortic aneurysm or dissection. Moderate  vascular calcification  2. Stable pancreatic cystic lesion seen at the pancreatic head. If  not previously evaluated, consider nonemergent pancreatic MRI to  better interrogate the internal features.  3. Subtle peripancreatic fluid and edema. Correlation with pancreatic  enzymes advised to exclude mild pancreatitis.  4. Emphysema. Bronchial thickening  5. Subtle bladder wall thickening. Correlation with urinalysis is  advised to exclude cystitis    I discussed lab work and imaging with patient and wife at bedside and they do not want admitted although I did offer multiple times and would prefer discharge home and close follow-up.  Was found to have cystitis and given Keflex here and a prescription sent to the pharmacy.  Discussed with them strict return precautions.  Also discussed all CT findings with them.    On reevaluation he developed a fever here and after further discussion with wife not comfortable going home so we will give him a dose of Rocephin and Keflex will be Keflex.  Case discussed with hospitalist service who agreed on admission.  Lipase is elevated.  Was also given a dose of Tylenol for the fever.    I discussed the differential, results and plan with the patient and/or family/friend/caregiver if present. All questions answered.        ED Course as of 05/29/24 0150 Tue May 28, 2024   1813 OCCULT BLOOD, STOOL X1: Negative [BN]   1813 HEMOGLOBIN(!): 9.8 [BN]      ED Course User Index  [BN] Don Patel MD         Diagnoses as of 05/29/24 0150   Generalized weakness   Cystitis   Abnormal CT scan   Acute pancreatitis, unspecified  complication status, unspecified pancreatitis type ([facility])                    Note: This note was dictated by speech recognition. Minor errors in transcription may be present.

## 2024-05-29 NOTE — CARE PLAN
The patient's goals for the shift include      The clinical goals for the shift include  patient will have a decrease in c/o pain through shift    Over the shift, the patient did not make progress toward the following goals. Barriers to progression include . Recommendations to address these barriers include   Problem: Pain  Goal: My pain/discomfort is manageable  Outcome: Progressing  Flowsheets (Taken 5/29/2024 0346)  Resident's pain/discomfort is manageable: Offer non-pharmacological pain management interventions   .

## 2024-05-29 NOTE — PROGRESS NOTES
"Physical Therapy                 Therapy Communication Note    Patient Name: Bret Rowland \"Bill\"  MRN: [id number]  Today's Date: 5/29/2024     Discipline: Physical Therapy    Missed Visit Reason: Missed Visit Reason: Cancel (Per OT patient is independent for functional mobility, no skilled PT necessary at this time.)    Missed Time: Cancel    "

## 2024-05-29 NOTE — PROGRESS NOTES
"Occupational Therapy    Evaluation    Patient Name: Bret Rowland \"Oren\"  MRN: [id number]  Today's Date: 5/29/2024  Time Calculation  Start Time: 1110  Stop Time: 1122  Time Calculation (min): 12 min    Assessment  IP OT Assessment  OT Assessment: Pt at functional baseline with ADL performance and household mobility. No further OT needs or goals identified.  Prognosis: Excellent  Barriers to Discharge: None  Evaluation/Treatment Tolerance: Patient tolerated treatment well  Medical Staff Made Aware: Yes  End of Session Communication: Bedside nurse  End of Session Patient Position: Bed, 3 rail up, Alarm on  Plan:  No Skilled OT: No acute OT goals identified  OT Frequency: OT eval only  OT Discharge Recommendations: No further acute OT  Equipment Recommended upon Discharge: Wheeled walker  OT Recommended Transfer Status: Stand by assist  OT - OK to Discharge: Yes (per OT POC)    Subjective   Current Problem:  1. Generalized weakness        2. Cystitis  cephalexin (Keflex) 500 mg capsule      3. Abnormal CT scan        4. Acute pancreatitis, unspecified complication status, unspecified pancreatitis type ([facility])          General:  General  Reason for Referral: 79 yo male referred to OT for generalized weakness  Referred By: Edgard Silva MD  Past Medical History Relevant to Rehab: HTN, OA, Anemia  Prior to Session Communication: Bedside nurse  Patient Position Received: Bed, 3 rail up, Alarm on  General Comment: Pt pleasant, cooperative with OT evaluation. IV in place.  Precautions:  Medical Precautions: Fall precautions     Pain:  Pain Assessment  Pain Assessment: 0-10  Pain Score: 0 - No pain    Objective   Cognition:  Overall Cognitive Status: Within Functional Limits  Orientation Level: Oriented X4     Home Living:  Type of Home: House  Lives With: Spouse  Home Adaptive Equipment: Walker rolling or standard  Home Layout: One level  Home Access: Stairs to enter without rails  Entrance Stairs-Rails: " None  Entrance Stairs-Number of Steps: 1 step threshold  Bathroom Shower/Tub: Tub/shower unit  Bathroom Toilet: Handicapped height  Bathroom Equipment: Grab bars in shower, Grab bars around toilet   Prior Function:  Level of [place]: Independent with ADLs and functional transfers, Independent with homemaking with ambulation  Receives Help From: Family  ADL Assistance: Independent  Homemaking Assistance: Independent (shares IADL with wife, (+) driving)  Ambulatory Assistance: Independent (no devices used)    ADL:  Eating Assistance: Independent  Grooming Assistance: Independent  Bathing Assistance: Stand by  UE Dressing Assistance: Independent  LE Dressing Assistance: Independent  Toileting Assistance with Device: Stand by  Functional Assistance: Stand by  ADL Comments: Pt stood/balanced in front of toilet to urinate, able to don/doff socks without physical assist. Other ADL performance anticipated d/t current clincial presentation  Activity Tolerance:  Endurance: Tolerates 10 - 20 min exercise with multiple rests  Bed Mobility/Transfers: Bed Mobility  Bed Mobility: Yes  Bed Mobility 1  Bed Mobility 1: Supine to sitting, Sitting to supine  Level of Assistance 1: Modified independent  Bed Mobility Comments 1: bed flat    Transfers  Transfer: Yes  Transfer 1  Transfer From 1: Sit to  Transfer to 1: Stand  Technique 1: Sit to stand, Stand to sit  Transfer Device 1: Walker  Transfer Level of Assistance 1: Modified independent    Functional Mobility:  Functional Mobility  Functional Mobility Performed: Yes  Functional Mobility 1  Surface 1: Level tile  Device 1: Rolling walker  Assistance 1: Close supervision  Comments 1: ambulated from bed <> bathroom with intermittent use of WW. Pt with good balance, no concerns with household distances  Sitting Balance:  Static Sitting Balance  Static Sitting-Balance Support: Feet supported  Static Sitting-Level of Assistance: Independent  Standing Balance:  Static Standing  Balance  Static Standing-Balance Support: Bilateral upper extremity supported  Static Standing-Level of Assistance: Distant supervision    Strength:  Strength Comments: MEGAN 5/5    Hand Function:  Hand Function  Gross Grasp: Functional  Coordination: Functional  Extremities: RUE   RUE : Within Functional Limits and LUE   LUE: Within Functional Limits    Outcome Measures: [facility] Daily Activity  Putting on and taking off regular lower body clothing: None  Bathing (including washing, rinsing, drying): A little  Putting on and taking off regular upper body clothing: None  Toileting, which includes using toilet, bedpan or urinal: A little  Taking care of personal grooming such as brushing teeth: None  Eating Meals: None  Daily Activity - Total Score: 22      Education Documentation  Body Mechanics, taught by Semaj Thompson OT at 5/29/2024 11:33 AM.  Learner: Patient  Readiness: Acceptance  Method: Explanation  Response: Verbalizes Understanding    Precautions, taught by Semaj Thompson OT at 5/29/2024 11:33 AM.  Learner: Patient  Readiness: Acceptance  Method: Explanation  Response: Verbalizes Understanding    ADL Training, taught by Semaj Thompson OT at 5/29/2024 11:33 AM.  Learner: Patient  Readiness: Acceptance  Method: Explanation  Response: Verbalizes Understanding    Education Comments  No comments found.

## 2024-05-29 NOTE — PROGRESS NOTES
05/29/24 1100   Referral Data   Referral Source Nurse   Referral Reason Follow up   Legal   Legal Comments SW consulted for AD.  Pt has AD on file, contact info updated & copies placed in chart.

## 2024-05-29 NOTE — CONSULTS
"Patient has Malnutrition Diagnosis: No  Nutrition Assessment    Reason for Assessment: Admission nursing screening (MST=3 wieght loss, eating poorly; consult needed: NO)    Patient is a 78 y.o. male presenting with: Generalized weakness,   Past Medical History:   Diagnosis Date    Anemia 2019    Arthritis 2019    GERD (gastroesophageal reflux disease) 2000    Headache April 20, 2024    Hypertension 2000    Inflammatory bowel disease 2020    Osteoporosis     Personal history of other diseases of the digestive system     History of gastroesophageal reflux (GERD)    Personal history of other diseases of the musculoskeletal system and connective tissue     History of arthritis      Past Surgical History:   Procedure Laterality Date    OTHER SURGICAL HISTORY  10/07/2019    Trigger finger repair    SEPTOPLASTY  2023    elizabeth    SINUS SURGERY  Jan 2024    WISDOM TOOTH EXTRACTION  Jan 2024      Nutrition History:  Food and Nutrient History: Pt reports decreased oral  intakes over past 7 days- this AM per pt he was able to tolerate fruit, juice, and coffee. Pt reports diarrhea that occurs intermittently- no foods aggravate this in particular per pt.     Allergies   Allergen Reactions    Amoxicillin-Pot Clavulanate Unknown and Other    Erythromycin Unknown and Other    Hydroxychloroquine Hives and Other    Levofloxacin Hives     Other Reaction(s): hive    Nsaids (Non-Steroidal Anti-Inflammatory Drug) Unknown    Sulfa (Sulfonamide Antibiotics) Unknown      GI Symptoms: Diarrhea     Oral Problems: None          Anthropometrics:  Height: 172.7 cm (5' 8\")   Weight: 72.6 kg (160 lb)   BMI (Calculated): 24.33  IBW/kg (Dietitian Calculated): 70 kg  Percent of IBW: 104 %       Weight History:   Wt Readings from Last 10 Encounters:   05/28/24 72.6 kg (160 lb)   05/28/24 72.6 kg (160 lb)   04/24/24 74.8 kg (165 lb)   04/22/24 74.4 kg (164 lb)   02/28/24 75.3 kg (166 lb)   01/17/24 75.8 kg (167 lb)   12/04/23 75.8 kg (167 lb)   11/20/23 " 74.8 kg (165 lb)   10/23/23 75.3 kg (166 lb)   08/28/23 76.3 kg (168 lb 3.2 oz)       Weight Change %:     Significant Weight Loss: No          Nutrition Focused Physical Exam Findings:    Subcutaneous Fat Loss:   Orbital Fat Pads: Mild-Moderate (slight dark circles and slight hollowing)  Buccal Fat Pads: Well nourished (full, rounded cheeks)  Muscle Wasting:  Temporalis: Well nourished (well-defined muscle)  Pectoralis (Clavicular Region): Mild-Moderate (some protrusion of clavicle)  Edema:  Edema: none  Physical Findings:  Skin: Negative (intact)    Nutrition Significant Labs:  CBC Trend:   Results from last 7 days   Lab Units 05/29/24  0450 05/28/24  1719   WBC AUTO x10*3/uL 6.2 7.0   RBC AUTO x10*6/uL 2.96* 3.49*   HEMOGLOBIN g/dL 8.3* 9.8*   HEMATOCRIT % 25.9* 30.7*   MCV fL 88 88   PLATELETS AUTO x10*3/uL 119* 158    , BMP Trend:   Results from last 7 days   Lab Units 05/29/24  0450 05/28/24  1719   GLUCOSE mg/dL 68* 93   CALCIUM mg/dL 7.5* 8.8   SODIUM mmol/L 132* 137   POTASSIUM mmol/L 3.5 4.0   CO2 mmol/L 19* 21   CHLORIDE mmol/L 102 102   BUN mg/dL 30* 27*   CREATININE mg/dL 1.47* 1.48*    , A1C:  Lab Results   Component Value Date    HGBA1C 5.4 02/28/2024   , BG POCT trend:    , Liver Function Trend:   Results from last 7 days   Lab Units 05/28/24  1719   ALK PHOS U/L 138*   AST U/L 64*   ALT U/L 36   BILIRUBIN TOTAL mg/dL 0.6    , Vit D:   Lab Results   Component Value Date    VITD25 84 12/04/2023    , Vit B12:   Lab Results   Component Value Date    THZXPGIG20 1,616 (H) 02/28/2024    , CRP:   Lab Results   Component Value Date    CRP 0.41 04/22/2024     Lab Results   Component Value Date    ALBUMIN 3.8 05/28/2024         Nutrition Specific Medications:      Current Facility-Administered Medications:     acetaminophen (Tylenol) tablet 650 mg, 650 mg, oral, q4h PRN **OR** acetaminophen (Tylenol) oral liquid 650 mg, 650 mg, oral, q4h PRN **OR** acetaminophen (Tylenol) suppository 650 mg, 650 mg, rectal,  q4h PRN, Edgard Silva MD    alum-mag hydroxide-simeth (Mylanta) 200-200-20 mg/5 mL oral suspension 30 mL, 30 mL, oral, q6h PRN, Edgard Silva MD    azaTHIOprine (Imuran) tablet 50 mg, 50 mg, oral, BID, Edgard Silva MD, 50 mg at 05/29/24 0831    calcium carbonate (Tums) chewable tablet 500 mg, 500 mg, oral, 4x daily PRN, Edgard Silva MD    cefTRIAXone (Rocephin) IVPB 1 g, 1 g, intravenous, q24h, Edgard Silva MD    cholecalciferol (Vitamin D-3) capsule 125 mcg, 5,000 Units, oral, Daily, Edgard Silva MD, 125 mcg at 05/29/24 0830    dextromethorphan-guaifenesin (Robitussin DM)  mg/5 mL oral liquid 5 mL, 5 mL, oral, q4h PRN, Edgard Silva MD    [Held by provider] enoxaparin (Lovenox) syringe 40 mg, 40 mg, subcutaneous, q24h, Edgard Silva MD, 40 mg at 05/29/24 0830    guaiFENesin (Mucinex) 12 hr tablet 600 mg, 600 mg, oral, q12h PRN, Edgard Silva MD    [Held by provider] lisinopril tablet 10 mg, 10 mg, oral, Daily, Edgard Silva MD    ondansetron (Zofran) tablet 4 mg, 4 mg, oral, q8h PRN **OR** ondansetron (Zofran) injection 4 mg, 4 mg, intravenous, q8h PRN, Edgard Silva MD    pantoprazole (ProtoNix) EC tablet 40 mg, 40 mg, oral, Daily before breakfast, Edgard Silva MD, 40 mg at 05/29/24 0656    sodium chloride 0.9% infusion, 100 mL/hr, intravenous, Continuous, Edgard Silva MD, Last Rate: 100 mL/hr at 05/29/24 1508, 100 mL/hr at 05/29/24 1508   Nursing Data Per flowsheet:   Stool Appearance: Loose, Mucous (05/29/24 1000)  Gastrointestinal  Gastrointestinal (WDL): Exceptions to WDL  Bowel Incontinence: Yes  Stool Appearance: Loose, Mucous  Stool Color: Green    Intake/Output Summary (Last 24 hours) at 5/29/2024 1515  Last data filed at 5/29/2024 1508  Gross per 24 hour   Intake 2951.67 ml   Output --   Net 2951.67 ml       Pain Score: 0 - No pain   Dietary Orders (From admission, onward)       Start     Ordered    05/29/24 1200  Oral  nutritional supplements  Until discontinued        Question Answer Comment   Deliver with Lunch strawberry   Select supplement: Ensure Plus High Protein        05/29/24 1159    05/29/24 0333  Adult diet Regular  Diet effective now        Question:  Diet type  Answer:  Regular    05/29/24 0332                     Estimated Needs:   Total Energy Estimated Needs (kCal):  (1815 kcal (25 kcal/kg))     Total Protein Estimated Needs (g):  ( g protein (1.2-1.5 g/kg))     Total Fluid Estimated Needs (mL):  (1 ml/kcal)          Nutrition Diagnosis   Malnutrition Diagnosis  Patient has Malnutrition Diagnosis: No    Nutrition Diagnosis  Patient has Nutrition Diagnosis: Yes  Diagnosis Status (1): New  Nutrition Diagnosis 1: Increased nutrient needs  Related to (1): pancreatitis, fever  As Evidenced by (1): altered metabolic demand with illness  Additional Assessment Information (1): ONS added for additional kcal and protein       Nutrition Interventions/Recommendations   Nutrition Prescription:  diet, fluids    Nutrition Interventions:   Interventions: Medical food supplement  Medical Food Supplement: Commercial beverage  Goal: Ensure Plus High Protein daily= 350 kcal, 20 g protein      Coordination of Care: MERY Flor  Nutrition Education:   Education Documentation  No documentation found.       Recommendations:  Monitor need for therapeutic diet restrictions  Weights: 2-3 x/week for trends  RFP, Mg daily; replete lytes prn          Nutrition Monitoring and Evaluation     Food/Nutrient Related History Monitoring  Monitoring and Evaluation Plan: Amount of food  Criteria: Pt will consume 50-75% of meals and ONS provided    Body Composition/Growth/Weight History  Monitoring and Evaluation Plan: Weight  Weight: Measured weight  Criteria: Pt will maintain current weight of 72.6 kg +/- 2 kg       Time Spent (min): 60 minutes

## 2024-05-29 NOTE — PROGRESS NOTES
"Bret Rowland \"Oren\" is a 78 y.o. male on day 0 of admission presenting with Generalized weakness.    Subjective   Pt denies pain or new complaints.        Objective     Physical Exam  Vitals and nursing note reviewed.   Constitutional:       General: He is not in acute distress.     Appearance: Normal appearance. He is not ill-appearing or toxic-appearing.   HENT:      Head: Normocephalic and atraumatic.      Nose: Nose normal.      Mouth/Throat:      Mouth: Mucous membranes are moist.   Eyes:      Extraocular Movements: Extraocular movements intact.      Conjunctiva/sclera: Conjunctivae normal.      Pupils: Pupils are equal, round, and reactive to light.   Cardiovascular:      Rate and Rhythm: Regular rhythm. Tachycardia present.      Heart sounds: No murmur heard.     No gallop.   Pulmonary:      Effort: Pulmonary effort is normal. No respiratory distress.      Breath sounds: Normal breath sounds. No wheezing, rhonchi or rales.   Abdominal:      General: Abdomen is flat. Bowel sounds are normal. There is no distension.      Palpations: Abdomen is soft. There is no mass.      Tenderness: There is no abdominal tenderness.   Musculoskeletal:         General: No swelling or tenderness. Normal range of motion.      Cervical back: Normal range of motion and neck supple.   Skin:     General: Skin is warm and dry.   Neurological:      Mental Status: He is alert.      Comments: Aox2-3, some confusion apparent, generalized weakness appreciated   Psychiatric:         Mood and Affect: Mood normal.         Behavior: Behavior normal.         Thought Content: Thought content normal.         Judgment: Judgment normal.         Last Recorded Vitals:  /54 (BP Location: Right arm, Patient Position: Lying)   Pulse (!) 111   Temp 36.5 °C (97.7 °F) (Temporal)   Resp 20   Ht 1.727 m (5' 8\")   Wt 72.6 kg (160 lb)   SpO2 95%   BMI 24.33 kg/m²      Scheduled medications:  azaTHIOprine, 50 mg, oral, BID  cefTRIAXone, 1 g, " intravenous, q24h  cholecalciferol, 5,000 Units, oral, Daily  docusate sodium, 100 mg, oral, BID  enoxaparin, 40 mg, subcutaneous, q24h  [Held by provider] lisinopril, 10 mg, oral, Daily  pantoprazole, 40 mg, oral, Daily before breakfast      Continuous medications:  sodium chloride 0.9%, 100 mL/hr, Last Rate: 100 mL/hr (05/29/24 0831)      PRN medications:  PRN medications: acetaminophen **OR** acetaminophen **OR** acetaminophen, alum-mag hydroxide-simeth, calcium carbonate, dextromethorphan-guaifenesin, guaiFENesin, ondansetron **OR** ondansetron     Relevant Results:  Results for orders placed or performed during the hospital encounter of 05/28/24 (from the past 24 hour(s))   CBC and Auto Differential   Result Value Ref Range    WBC 7.0 4.4 - 11.3 x10*3/uL    nRBC 0.0 0.0 - 0.0 /100 WBCs    RBC 3.49 (L) 4.50 - 5.90 x10*6/uL    Hemoglobin 9.8 (L) 13.5 - 17.5 g/dL    Hematocrit 30.7 (L) 41.0 - 52.0 %    MCV 88 80 - 100 fL    MCH 28.1 26.0 - 34.0 pg    MCHC 31.9 (L) 32.0 - 36.0 g/dL    RDW 17.1 (H) 11.5 - 14.5 %    Platelets 158 150 - 450 x10*3/uL    Neutrophils % 67.8 40.0 - 80.0 %    Immature Granulocytes %, Automated 0.3 0.0 - 0.9 %    Lymphocytes % 18.1 13.0 - 44.0 %    Monocytes % 10.2 2.0 - 10.0 %    Eosinophils % 3.0 0.0 - 6.0 %    Basophils % 0.6 0.0 - 2.0 %    Neutrophils Absolute 4.77 1.60 - 5.50 x10*3/uL    Immature Granulocytes Absolute, Automated 0.02 0.00 - 0.50 x10*3/uL    Lymphocytes Absolute 1.27 0.80 - 3.00 x10*3/uL    Monocytes Absolute 0.72 0.05 - 0.80 x10*3/uL    Eosinophils Absolute 0.21 0.00 - 0.40 x10*3/uL    Basophils Absolute 0.04 0.00 - 0.10 x10*3/uL   Comprehensive metabolic panel   Result Value Ref Range    Glucose 93 74 - 99 mg/dL    Sodium 137 136 - 145 mmol/L    Potassium 4.0 3.5 - 5.3 mmol/L    Chloride 102 98 - 107 mmol/L    Bicarbonate 21 21 - 32 mmol/L    Anion Gap 18 10 - 20 mmol/L    Urea Nitrogen 27 (H) 6 - 23 mg/dL    Creatinine 1.48 (H) 0.50 - 1.30 mg/dL    eGFR 48 (L) >60  mL/min/1.73m*2    Calcium 8.8 8.6 - 10.3 mg/dL    Albumin 3.8 3.4 - 5.0 g/dL    Alkaline Phosphatase 138 (H) 33 - 136 U/L    Total Protein 6.7 6.4 - 8.2 g/dL    AST 64 (H) 9 - 39 U/L    Bilirubin, Total 0.6 0.0 - 1.2 mg/dL    ALT 36 10 - 52 U/L   Magnesium   Result Value Ref Range    Magnesium 2.08 1.60 - 2.40 mg/dL   Phosphorus   Result Value Ref Range    Phosphorus 4.3 2.5 - 4.9 mg/dL   TSH with reflex to Free T4 if abnormal   Result Value Ref Range    Thyroid Stimulating Hormone 2.27 0.44 - 3.98 mIU/L   B-type natriuretic peptide   Result Value Ref Range     (H) 0 - 99 pg/mL   Troponin I, High Sensitivity, Initial   Result Value Ref Range    Troponin I, High Sensitivity 35 (H) 0 - 20 ng/L   ECG 12 lead   Result Value Ref Range    Ventricular Rate 92 BPM    Atrial Rate 92 BPM    MS Interval 140 ms    QRS Duration 92 ms    QT Interval 396 ms    QTC Calculation(Bazett) 489 ms    P Axis 80 degrees    R Axis -6 degrees    T Axis 53 degrees    QRS Count 15 beats    Q Onset 223 ms    P Onset 153 ms    P Offset 201 ms    T Offset 421 ms    QTC Fredericia 456 ms   Occult Blood, Stool    Specimen: Stool   Result Value Ref Range    Occult Blood, Stool X1 Negative Negative   Troponin, High Sensitivity, 1 Hour   Result Value Ref Range    Troponin I, High Sensitivity 34 (H) 0 - 20 ng/L   Lipase   Result Value Ref Range    Lipase 234 (H) 9 - 82 U/L   Urinalysis with Reflex Culture and Microscopic   Result Value Ref Range    Color, Urine Yellow Light-Yellow, Yellow, Dark-Yellow    Appearance, Urine Turbid (N) Clear    Specific Gravity, Urine 1.022 1.005 - 1.035    pH, Urine 5.5 5.0, 5.5, 6.0, 6.5, 7.0, 7.5, 8.0    Protein, Urine 50 (1+) (A) NEGATIVE, 10 (TRACE), 20 (TRACE) mg/dL    Glucose, Urine Normal Normal mg/dL    Blood, Urine NEGATIVE NEGATIVE    Ketones, Urine TRACE (A) NEGATIVE mg/dL    Bilirubin, Urine NEGATIVE NEGATIVE    Urobilinogen, Urine Normal Normal mg/dL    Nitrite, Urine NEGATIVE NEGATIVE    Leukocyte  Esterase, Urine NEGATIVE NEGATIVE   Extra Urine Gray Tube   Result Value Ref Range    Extra Tube Hold for add-ons.    Urinalysis Microscopic   Result Value Ref Range    WBC, Urine 6-10 (A) 1-5, NONE /HPF    RBC, Urine 1-2 NONE, 1-2, 3-5 /HPF    Mucus, Urine 1+ Reference range not established. /LPF    Hyaline Casts, Urine 4+ (A) NONE /LPF   Basic metabolic panel   Result Value Ref Range    Glucose 68 (L) 74 - 99 mg/dL    Sodium 132 (L) 136 - 145 mmol/L    Potassium 3.5 3.5 - 5.3 mmol/L    Chloride 102 98 - 107 mmol/L    Bicarbonate 19 (L) 21 - 32 mmol/L    Anion Gap 15 10 - 20 mmol/L    Urea Nitrogen 30 (H) 6 - 23 mg/dL    Creatinine 1.47 (H) 0.50 - 1.30 mg/dL    eGFR 49 (L) >60 mL/min/1.73m*2    Calcium 7.5 (L) 8.6 - 10.3 mg/dL   CBC   Result Value Ref Range    WBC 6.2 4.4 - 11.3 x10*3/uL    nRBC 0.0 0.0 - 0.0 /100 WBCs    RBC 2.96 (L) 4.50 - 5.90 x10*6/uL    Hemoglobin 8.3 (L) 13.5 - 17.5 g/dL    Hematocrit 25.9 (L) 41.0 - 52.0 %    MCV 88 80 - 100 fL    MCH 28.0 26.0 - 34.0 pg    MCHC 32.0 32.0 - 36.0 g/dL    RDW 16.6 (H) 11.5 - 14.5 %    Platelets 119 (L) 150 - 450 x10*3/uL   Lipase   Result Value Ref Range    Lipase 256 (H) 9 - 82 U/L       ECG 12 lead    Result Date: 5/29/2024  Sinus rhythm with occasional Premature ventricular complexes Incomplete right bundle branch block Nonspecific ST abnormality Prolonged QT Abnormal ECG When compared with ECG of 28-OCT-2022 04:53, Previous ECG has undetermined rhythm, needs review ST no longer depressed in Anterior leads    CT angio chest abdomen pelvis    Result Date: 5/28/2024  Interpreted By:  Yared Mei, STUDY: CT ANGIO CHEST ABDOMEN PELVIS;  5/28/2024 9:39 pm   INDICATION: Signs/Symptoms:shoulder pain, hip pain, mottled skin.   COMPARISON: 10/2022   ACCESSION NUMBER(S): [id number]   ORDERING CLINICIAN: AURY NORMAN   TECHNIQUE: Axial non-contrast images of the chest, abdomen, and pelvis  with coronal and sagittal reformatted images. Axial CT images of the chest,  abdomen and pelvis after the intravenous administration of of intravenous contrast using CT angiographic technique with coronal and sagittal reformatted images.  MIP images were provided and reviewed. 3D reconstructions were performed on a separate independent workstation.   FINDINGS: VASCULAR:   PULMONARY ARTERIES:   No acute pulmonary embolism.   THORACIC AORTA:  Non-contrast images show no evidence of acute intramural hematoma. No thoracic aortic aneurysm or dissection. Moderate thoracic aortic calcification. Anatomic pattern of great vessels off the arch is normal.   ABDOMINAL AORTA: Moderate vascular calcification. No aneurysm. This includes the origin of both renal arteries.     CHEST:   MEDIASTINUM AND LYMPH NODES: No enlarged intrathoracic or axillary lymph nodes.   HEART: Normal size.  No coronary artery calcifications. No pericardial effusion.   Lung fields: Pronounced emphysema. Bronchial thickening. No pneumothorax. No pleural effusions. Changes suggestive of old healed granulomas disease. No pericardial effusion.     Small sliding hiatal hernia   ABDOMEN/PELVIS:   Arterial phase imaging limits evaluation of the solid organs.   LIVER: Heterogeneous appearance. Small left hepatic cysts BILE DUCTS: Within normal limits. GALLBLADDER: Within normal limits. No radiopaque gallstone. PANCREAS: Cystic lesion seen in the head of the pancreas again detected measuring 2.0 x 3.4 cm. Additionally there is slight peripancreatic stranding. Correlation with pancreatic enzymes advised to exclude mild pancreatitis.   SPLEEN: Within normal limits. ADRENALS: Within normal limits. KIDNEYS, URETERS, BLADDER: Within normal limits. No hydronephrosis, stone, or focal lesion evident. Equivocal bladder wall thickening. Correlation with urinalysis advised.   VESSELS: See above.  No additional significant abnormality. LYMPH NODES: No enlarged lymph nodes. RETROPERITONEUM:  No significant abnormality.   BOWEL: Stomach is  unremarkable. No inflammatory bowel wall thickening or abnormal dilatation of the large or small bowel. Normal appendix. Mild diverticulosis. No focal bowel wall thickening.   PERITONEUM:   No significant ascites, free air, or fluid collection.   REPRODUCTIVE ORGANS: Within normal limits.   OSSEOUS STRUCTURES:  No acute osseous abnormality. ABDOMINAL WALL: Within normal limits.       1. No thoracic or abdominal aortic aneurysm or dissection. Moderate vascular calcification 2. Stable pancreatic cystic lesion seen at the pancreatic head. If not previously evaluated, consider nonemergent pancreatic MRI to better interrogate the internal features. 3. Subtle peripancreatic fluid and edema. Correlation with pancreatic enzymes advised to exclude mild pancreatitis. 4. Emphysema. Bronchial thickening 5. Subtle bladder wall thickening. Correlation with urinalysis is advised to exclude cystitis   Signed by: Yared Mei 5/28/2024 10:46 PM Dictation workstation:   [id number]    XR chest 2 views    Result Date: 5/28/2024  Interpreted By:  Babak Wiseman, STUDY: XR CHEST 2 VIEWS   INDICATION: Signs/Symptoms:Cough.   COMPARISON: June 7, 2023   ACCESSION NUMBER(S): [id number]   ORDERING CLINICIAN: BRIDGETTE BEAN   FINDINGS: No consolidation, effusion, edema, or pneumothorax. Heart size within normal limits.       No evidence of acute intrathoracic abnormality.   Signed by: Babak Wiseman 5/28/2024 5:34 PM Dictation workstation:   [id number]           Assessment/Plan   Principal Problem:    Generalized weakness    Acute metabolic encephalopathy 2/2 UTI  ?underlying dementia  - unclear baseline mentation  - Ucx pending  - CTX  - MOCA    Acute pancreatitis  - IVF  - trend lipase    NARAYAN  - IVF    Low bicarb  - monitor    Normocytic anemia  Hg trending down  Pt has been taking excedrin  - monitor  - GI consult  - protonix    Thrombocytopenia  - monitor    RA  - azathioprine    GERD  - protonix    DVT ppx: lovenox  Dispo: monitor  clinically          Loida Edwards MD  Hospitalist

## 2024-05-29 NOTE — H&P
History Of Present Illness  Oren Rowland is a 78 y.o. male, limited historian, presenting with weakness and confusion. Pt is not able to provide much history. He says his body has been throbbing. He also can't walk. Wife is currently unavailable. Per ED documentation, he developed weakness, confusion, lethargy, cough and diarrhea 2 days ago. He was febrile in the ED with a temp of 102.7. His work up in the ED revealed evidence of pancreatitis and UTI.      Past Medical History  Past Medical History:   Diagnosis Date    Anemia 2019    Arthritis 2019    GERD (gastroesophageal reflux disease) 2000    Headache April 20, 2024    Hypertension 2000    Inflammatory bowel disease 2020    Osteoporosis     Personal history of other diseases of the digestive system     History of gastroesophageal reflux (GERD)    Personal history of other diseases of the musculoskeletal system and connective tissue     History of arthritis       Past Surgical History  Past Surgical History:   Procedure Laterality Date    OTHER SURGICAL HISTORY  10/07/2019    Trigger finger repair    SEPTOPLASTY  2023    elizabeth    SINUS SURGERY  Jan 2024    WISDOM TOOTH EXTRACTION  Jan 2024        Social History  He reports that he quit smoking about 43 years ago. His smoking use included cigarettes. He started smoking about 49 years ago. He has a 5 pack-year smoking history. He has never used smokeless tobacco. He reports that he does not currently use alcohol after a past usage of about 10.0 standard drinks of alcohol per week. He reports that he does not use drugs.    Family History  Family History   Problem Relation Name Age of Onset    Arthritis Mother Daysi Rowland     Other (abestosis) Father      Cancer Father      No Known Problems Sister      No Known Problems Brother          Allergies  Amoxicillin-pot clavulanate, Erythromycin, Hydroxychloroquine, Levofloxacin, Nsaids (non-steroidal anti-inflammatory drug), and Sulfa (sulfonamide antibiotics)    Review  "of Systems   Reason unable to perform ROS: Confusion.   Constitutional:  Positive for activity change, fatigue and fever.   Respiratory:  Positive for cough.    Gastrointestinal:  Positive for diarrhea.   Psychiatric/Behavioral:  Positive for confusion.         Physical Exam  Constitutional:       General: He is not in acute distress.     Appearance: He is ill-appearing. He is not toxic-appearing or diaphoretic.   HENT:      Head: Normocephalic and atraumatic.      Nose: Nose normal.      Mouth/Throat:      Mouth: Mucous membranes are dry.      Pharynx: No oropharyngeal exudate or posterior oropharyngeal erythema.   Eyes:      General: No scleral icterus.        Right eye: No discharge.         Left eye: No discharge.   Cardiovascular:      Rate and Rhythm: Regular rhythm. Tachycardia present.      Heart sounds: No murmur heard.  Pulmonary:      Breath sounds: No wheezing, rhonchi or rales.   Abdominal:      General: There is no distension.      Palpations: There is no mass.      Tenderness: There is no abdominal tenderness.   Musculoskeletal:      Cervical back: Neck supple.      Right lower leg: No edema.      Left lower leg: No edema.   Lymphadenopathy:      Cervical: No cervical adenopathy.   Skin:     General: Skin is warm and dry.      Findings: No lesion or rash.   Neurological:      General: No focal deficit present.      Mental Status: He is alert. He is disoriented.   Psychiatric:         Mood and Affect: Mood normal.         Behavior: Behavior normal.          Last Recorded Vitals  Blood pressure (!) 119/100, pulse (!) 117, temperature (!) 39.3 °C (102.7 °F), resp. rate 20, height 1.727 m (5' 8\"), weight 72.6 kg (160 lb), SpO2 (!) 92%.    Relevant Results   Latest Reference Range & Units 05/28/24 17:19 05/28/24 17:25 05/28/24 18:26 05/28/24 18:27   GLUCOSE 74 - 99 mg/dL 93      SODIUM 136 - 145 mmol/L 137      POTASSIUM 3.5 - 5.3 mmol/L 4.0      CHLORIDE 98 - 107 mmol/L 102      Bicarbonate 21 - 32 mmol/L " 21      Anion Gap 10 - 20 mmol/L 18      Blood Urea Nitrogen 6 - 23 mg/dL 27 (H)      Creatinine 0.50 - 1.30 mg/dL 1.48 (H)      EGFR >60 mL/min/1.73m*2 48 (L)      Calcium 8.6 - 10.3 mg/dL 8.8      PHOSPHORUS 2.5 - 4.9 mg/dL 4.3      Albumin 3.4 - 5.0 g/dL 3.8      Alkaline Phosphatase 33 - 136 U/L 138 (H)      ALT 10 - 52 U/L 36      AST 9 - 39 U/L 64 (H)      Bilirubin Total 0.0 - 1.2 mg/dL 0.6      Total Protein 6.4 - 8.2 g/dL 6.7      MAGNESIUM 1.60 - 2.40 mg/dL 2.08      BNP 0 - 99 pg/mL 297 (H)      LIPASE 9 - 82 U/L   234 (H)    Troponin I, High Sensitivity 0 - 20 ng/L 35 (H)  34 (H)    Thyroid Stimulating Hormone 0.44 - 3.98 mIU/L 2.27      LEUKOCYTES (10*3/UL) IN BLOOD BY AUTOMATED COUNT, Prydeinig 4.4 - 11.3 x10*3/uL 7.0      nRBC 0.0 - 0.0 /100 WBCs 0.0      ERYTHROCYTES (10*6/UL) IN BLOOD BY AUTOMATED COUNT, Prydeinig 4.50 - 5.90 x10*6/uL 3.49 (L)      HEMOGLOBIN 13.5 - 17.5 g/dL 9.8 (L)      HEMATOCRIT 41.0 - 52.0 % 30.7 (L)      MCV 80 - 100 fL 88      MCH 26.0 - 34.0 pg 28.1      MCHC 32.0 - 36.0 g/dL 31.9 (L)      RED CELL DISTRIBUTION WIDTH 11.5 - 14.5 % 17.1 (H)      PLATELETS (10*3/UL) IN BLOOD AUTOMATED COUNT, Prydeinig 150 - 450 x10*3/uL 158      NEUTROPHILS/100 LEUKOCYTES IN BLOOD BY AUTOMATED COUNT, Prydeinig 40.0 - 80.0 % 67.8      Immature Granulocytes %, Automated 0.0 - 0.9 % 0.3      Lymphocytes % 13.0 - 44.0 % 18.1      Monocytes % 2.0 - 10.0 % 10.2      Eosinophils % 0.0 - 6.0 % 3.0      Basophils % 0.0 - 2.0 % 0.6      NEUTROPHILS (10*3/UL) IN BLOOD BY AUTOMATED COUNT, Prydeinig 1.60 - 5.50 x10*3/uL 4.77      Immature Granulocytes Absolute, Automated 0.00 - 0.50 x10*3/uL 0.02      Lymphocytes Absolute 0.80 - 3.00 x10*3/uL 1.27      Monocytes Absolute 0.05 - 0.80 x10*3/uL 0.72      Eosinophils Absolute 0.00 - 0.40 x10*3/uL 0.21      Basophils Absolute 0.00 - 0.10 x10*3/uL 0.04      URINE CULTURE     Rpt (IP)   Color, Urine Light-Yellow, Yellow, Dark-Yellow     Yellow   Appearance, Urine  Clear     Turbid !   Specific Gravity, Urine 1.005 - 1.035     1.022   pH, Urine 5.0, 5.5, 6.0, 6.5, 7.0, 7.5, 8.0     5.5   Protein, Urine NEGATIVE, 10 (TRACE), 20 (TRACE) mg/dL    50 (1+) !   Glucose, Urine Normal mg/dL    Normal   Blood, Urine NEGATIVE     NEGATIVE   Ketones, Urine NEGATIVE mg/dL    TRACE !   Bilirubin, Urine NEGATIVE     NEGATIVE   Urobilinogen, Urine Normal mg/dL    Normal   Nitrite, Urine NEGATIVE     NEGATIVE   Leukocyte Esterase, Urine NEGATIVE     NEGATIVE   OCCULT BLOOD, STOOL X1 Negative   Negative     Hyaline Casts, Urine NONE /LPF    4+ !   Mucus, Urine Reference range not established. /LPF    1+   RBC, Urine NONE, 1-2, 3-5 /HPF    1-2   WBC, Urine 1-5, NONE /HPF    6-10 !   (H): Data is abnormally high  (L): Data is abnormally low  !: Data is abnormal  (IP): In Process  Rpt: View report in Results Review for more information    CT ABD/PELVIS:     IMPRESSION:  1. No thoracic or abdominal aortic aneurysm or dissection. Moderate  vascular calcification  2. Stable pancreatic cystic lesion seen at the pancreatic head. If  not previously evaluated, consider nonemergent pancreatic MRI to  better interrogate the internal features.  3. Subtle peripancreatic fluid and edema. Correlation with pancreatic  enzymes advised to exclude mild pancreatitis.  4. Emphysema. Bronchial thickening  5. Subtle bladder wall thickening. Correlation with urinalysis is  advised to exclude cystitis     Assessment/Plan   Acute metabolic encephalopathy  Secondary to UTI    UTI  Urine culture  Ceftriaxone    Acute pancreatitis  IVF  Recheck lipase in am    NARAYAN  Prerenal  Volume expansion  Avoidance of nephrotoxic meds    Ambulatory dysfunction  Most likely related to some or all of above  PT/OT/SW    I spent 60 minutes in the professional and overall care of this patient.      Edgard Silva MD

## 2024-05-29 NOTE — PROGRESS NOTES
"Occupational Therapy    MoCA Completion    Patient Name: Bret Rowland \"Oren\"  MRN: [id number]  Today's Date: 5/29/2024  Time Calculation  Start Time: 1506  Stop Time: 1524  Time Calculation (min): 18 min    Subjective   Current Problem:  1. Generalized weakness        2. Cystitis  cephalexin (Keflex) 500 mg capsule      3. Abnormal CT scan        4. Acute pancreatitis, unspecified complication status, unspecified pancreatitis type ([facility])          Outcome Measures: MoCA  Visuospatial/Executive: 0  Naming: 3  Memory (Score '0' as this is an Unscored Section): 0  Attention: Read List of Digits: 1  Attention: Read List of Letters: 0  Attention: Serial Sevens: 1  Language: Repeat: 1  Language: Fluency: 0  Abstraction: 1  Delayed Recall: 0  Orientation: 4  Add 1 Point if </=12 yr Education: 0  MOCA Total Score: 11  A MOCA score of 26-30 indicates normal cognition. A MOCA score of 18-25 indicates mild cognitive impairment. A MOCA score of 10-17 indicates a moderate cognitive impairment. A MOCA score <10 indicates a severe cognitive impairment.         "

## 2024-05-30 ENCOUNTER — TELEPHONE (OUTPATIENT)
Dept: PRIMARY CARE | Facility: CLINIC | Age: 79
End: 2024-05-30
Payer: MEDICARE

## 2024-05-30 ENCOUNTER — PHARMACY VISIT (OUTPATIENT)
Dept: PHARMACY | Facility: CLINIC | Age: 79
End: 2024-05-30
Payer: MEDICARE

## 2024-05-30 VITALS
RESPIRATION RATE: 17 BRPM | DIASTOLIC BLOOD PRESSURE: 66 MMHG | SYSTOLIC BLOOD PRESSURE: 150 MMHG | HEIGHT: 68 IN | TEMPERATURE: 97.9 F | WEIGHT: 160 LBS | BODY MASS INDEX: 24.25 KG/M2 | OXYGEN SATURATION: 96 % | HEART RATE: 114 BPM

## 2024-05-30 LAB
ALBUMIN SERPL BCP-MCNC: 3 G/DL (ref 3.4–5)
ALP SERPL-CCNC: 105 U/L (ref 33–136)
ALT SERPL W P-5'-P-CCNC: 30 U/L (ref 10–52)
ANION GAP SERPL CALC-SCNC: 16 MMOL/L (ref 10–20)
AST SERPL W P-5'-P-CCNC: 68 U/L (ref 9–39)
BACTERIA UR CULT: NORMAL
BASOPHILS # BLD AUTO: 0.03 X10*3/UL (ref 0–0.1)
BASOPHILS NFR BLD AUTO: 0.5 %
BILIRUB SERPL-MCNC: 0.6 MG/DL (ref 0–1.2)
BUN SERPL-MCNC: 21 MG/DL (ref 6–23)
CALCIUM SERPL-MCNC: 7.4 MG/DL (ref 8.6–10.3)
CHLORIDE SERPL-SCNC: 107 MMOL/L (ref 98–107)
CO2 SERPL-SCNC: 18 MMOL/L (ref 21–32)
CREAT SERPL-MCNC: 0.98 MG/DL (ref 0.5–1.3)
EGFRCR SERPLBLD CKD-EPI 2021: 79 ML/MIN/1.73M*2
EOSINOPHIL # BLD AUTO: 0.31 X10*3/UL (ref 0–0.4)
EOSINOPHIL NFR BLD AUTO: 5 %
ERYTHROCYTE [DISTWIDTH] IN BLOOD BY AUTOMATED COUNT: 17 % (ref 11.5–14.5)
GLUCOSE SERPL-MCNC: 69 MG/DL (ref 74–99)
HCT VFR BLD AUTO: 28 % (ref 41–52)
HGB BLD-MCNC: 9.2 G/DL (ref 13.5–17.5)
IMM GRANULOCYTES # BLD AUTO: 0.04 X10*3/UL (ref 0–0.5)
IMM GRANULOCYTES NFR BLD AUTO: 0.7 % (ref 0–0.9)
LIPASE SERPL-CCNC: 175 U/L (ref 9–82)
LYMPHOCYTES # BLD AUTO: 0.54 X10*3/UL (ref 0.8–3)
LYMPHOCYTES NFR BLD AUTO: 8.8 %
MCH RBC QN AUTO: 29.3 PG (ref 26–34)
MCHC RBC AUTO-ENTMCNC: 32.9 G/DL (ref 32–36)
MCV RBC AUTO: 89 FL (ref 80–100)
MONOCYTES # BLD AUTO: 0.42 X10*3/UL (ref 0.05–0.8)
MONOCYTES NFR BLD AUTO: 6.8 %
NEUTROPHILS # BLD AUTO: 4.8 X10*3/UL (ref 1.6–5.5)
NEUTROPHILS NFR BLD AUTO: 78.2 %
NRBC BLD-RTO: 0 /100 WBCS (ref 0–0)
PLATELET # BLD AUTO: 146 X10*3/UL (ref 150–450)
POTASSIUM SERPL-SCNC: 3.5 MMOL/L (ref 3.5–5.3)
PROT SERPL-MCNC: 5.3 G/DL (ref 6.4–8.2)
RBC # BLD AUTO: 3.14 X10*6/UL (ref 4.5–5.9)
SODIUM SERPL-SCNC: 137 MMOL/L (ref 136–145)
WBC # BLD AUTO: 6.1 X10*3/UL (ref 4.4–11.3)

## 2024-05-30 PROCEDURE — 99239 HOSP IP/OBS DSCHRG MGMT >30: CPT | Performed by: STUDENT IN AN ORGANIZED HEALTH CARE EDUCATION/TRAINING PROGRAM

## 2024-05-30 PROCEDURE — 84075 ASSAY ALKALINE PHOSPHATASE: CPT | Performed by: STUDENT IN AN ORGANIZED HEALTH CARE EDUCATION/TRAINING PROGRAM

## 2024-05-30 PROCEDURE — 83690 ASSAY OF LIPASE: CPT | Performed by: STUDENT IN AN ORGANIZED HEALTH CARE EDUCATION/TRAINING PROGRAM

## 2024-05-30 PROCEDURE — 85025 COMPLETE CBC W/AUTO DIFF WBC: CPT | Performed by: STUDENT IN AN ORGANIZED HEALTH CARE EDUCATION/TRAINING PROGRAM

## 2024-05-30 PROCEDURE — 2500000001 HC RX 250 WO HCPCS SELF ADMINISTERED DRUGS (ALT 637 FOR MEDICARE OP): Performed by: INTERNAL MEDICINE

## 2024-05-30 PROCEDURE — RXMED WILLOW AMBULATORY MEDICATION CHARGE

## 2024-05-30 PROCEDURE — G0378 HOSPITAL OBSERVATION PER HR: HCPCS

## 2024-05-30 PROCEDURE — 36415 COLL VENOUS BLD VENIPUNCTURE: CPT | Performed by: STUDENT IN AN ORGANIZED HEALTH CARE EDUCATION/TRAINING PROGRAM

## 2024-05-30 PROCEDURE — 2500000004 HC RX 250 GENERAL PHARMACY W/ HCPCS (ALT 636 FOR OP/ED): Performed by: INTERNAL MEDICINE

## 2024-05-30 RX ORDER — PANTOPRAZOLE SODIUM 40 MG/1
40 TABLET, DELAYED RELEASE ORAL
Qty: 30 TABLET | Refills: 0 | Status: SHIPPED | OUTPATIENT
Start: 2024-05-31 | End: 2024-06-30

## 2024-05-30 RX ADMIN — Medication 125 MCG: at 09:41

## 2024-05-30 RX ADMIN — ACETAMINOPHEN 650 MG: 325 TABLET ORAL at 01:43

## 2024-05-30 RX ADMIN — PANTOPRAZOLE SODIUM 40 MG: 40 TABLET, DELAYED RELEASE ORAL at 06:08

## 2024-05-30 RX ADMIN — AZATHIOPRINE 50 MG: 50 TABLET ORAL at 09:41

## 2024-05-30 ASSESSMENT — PAIN SCALES - GENERAL
PAINLEVEL_OUTOF10: 0 - NO PAIN
PAINLEVEL_OUTOF10: 5 - MODERATE PAIN

## 2024-05-30 ASSESSMENT — COGNITIVE AND FUNCTIONAL STATUS - GENERAL
MOBILITY SCORE: 22
CLIMB 3 TO 5 STEPS WITH RAILING: A LITTLE
WALKING IN HOSPITAL ROOM: A LITTLE
WALKING IN HOSPITAL ROOM: A LITTLE
MOBILITY SCORE: 22
CLIMB 3 TO 5 STEPS WITH RAILING: A LITTLE
DRESSING REGULAR UPPER BODY CLOTHING: A LITTLE
DAILY ACTIVITIY SCORE: 22
TOILETING: A LITTLE
TOILETING: A LITTLE
DAILY ACTIVITIY SCORE: 23

## 2024-05-30 ASSESSMENT — PAIN - FUNCTIONAL ASSESSMENT
PAIN_FUNCTIONAL_ASSESSMENT: 0-10
PAIN_FUNCTIONAL_ASSESSMENT: 0-10

## 2024-05-30 ASSESSMENT — ACTIVITIES OF DAILY LIVING (ADL): LACK_OF_TRANSPORTATION: NO

## 2024-05-30 ASSESSMENT — PAIN DESCRIPTION - LOCATION: LOCATION: HIP

## 2024-05-30 NOTE — DISCHARGE SUMMARY
Discharge Diagnosis  New cognitive impairment diagnosis with MOCA 11/30    Issues Requiring Follow-Up  Post hospital follow up    Discharge Meds     Your medication list        START taking these medications        Instructions Last Dose Given Next Dose Due   cephalexin 500 mg capsule  Commonly known as: Keflex      Take 1 capsule (500 mg) by mouth 3 times a day for 10 days.              ASK your doctor about these medications        Instructions Last Dose Given Next Dose Due   azaTHIOprine 50 mg tablet  Commonly known as: Imuran      Take 1 tablet (50 mg) by mouth 2 times a day.       cholecalciferol 125 MCG (5000 UT) capsule  Commonly known as: Vitamin D-3           Excedrin Extra Strength 250-250-65 mg tablet  Generic drug: aspirin-acetaminophen-caffeine           ibandronate 150 mg tablet  Commonly known as: Boniva      Take 1 tablet (150 mg) by mouth every 30 (thirty) days.       lisinopril 10 mg tablet      Take 1 tablet by mouth once daily       omeprazole 20 mg DR capsule  Commonly known as: PriLOSEC                     Where to Get Your Medications        These medications were sent to [place] [id number]      Phone: [phone]   cephalexin 500 mg capsule         Test Results Pending At Discharge  Pending Labs       No current pending labs.            Hospital Course   Oren Rowland is a 78 y.o. male, limited historian, presenting with weakness and confusion. He is treated for  Acute metabolic encephalopathy 2/2 new dementia   MOCA 11/30  - UTI is ruled out     Acute pancreatitis  resolved  - IVF     NARAYAN  resolved  - IVF     Low bicarb  1x sodium bicarb  - monitor     Normocytic anemia  Hg stable  Pt has been taking excedrin  - protonix     Thrombocytopenia  - monitor     RA  - azathioprine     GERD  - protonix     Pt is hemodynamically stable for discharge at this time with close outpatient follow ups.     The patient was  discharged in satisfactory condition.   Medications and side effect profile reviewed with patient.  More than 30 minutes were spent in coordinating patient discharge     Pertinent Physical Exam At Time of Discharge  Physical Exam  Vitals and nursing note reviewed.   Constitutional:       General: He is not in acute distress.     Appearance: Normal appearance. He is not ill-appearing or toxic-appearing.   HENT:      Head: Normocephalic and atraumatic.      Nose: Nose normal.      Mouth/Throat:      Mouth: Mucous membranes are moist.   Eyes:      Extraocular Movements: Extraocular movements intact.      Conjunctiva/sclera: Conjunctivae normal.      Pupils: Pupils are equal, round, and reactive to light.   Cardiovascular:      Rate and Rhythm: Regular rhythm reg rate.     Heart sounds: No murmur heard.     No gallop.   Pulmonary:      Effort: Pulmonary effort is normal. No respiratory distress.      Breath sounds: Normal breath sounds. No wheezing, rhonchi or rales.   Abdominal:      General: Abdomen is flat. Bowel sounds are normal. There is no distension.      Palpations: Abdomen is soft. There is no mass.      Tenderness: There is no abdominal tenderness.   Musculoskeletal:         General: No swelling or tenderness. Normal range of motion.      Cervical back: Normal range of motion and neck supple.   Skin:     General: Skin is warm and dry.   Neurological:      Mental Status: He is alert.      Comments: Aox2-3, some confusion apparent, generalized weakness appreciated   Psychiatric:         Mood and Affect: Mood normal.         Behavior: Behavior normal.         Thought Content: Thought content normal.         Judgment: Judgment normal.     Outpatient Follow-Up  Future Appointments   Date Time Provider Department Center   8/19/2024  9:20 AM Tamiko Vera MD [id number] [facility]   8/28/2024 11:15 AM Justin Gallo MD [id number] [facility]         Loida Edwards MD  Hospitalist

## 2024-05-30 NOTE — CARE PLAN
The patient's goals for the shift include      The clinical goals for the shift include progressing    Over the shift, the patient did not make progress toward the following goals. Barriers to progression include . Recommendations to address these barriers include   Problem: Safety  Goal: Patient will be injury free during hospitalization  Outcome: Progressing     Problem: Daily Care  Goal: Daily care needs are met  Outcome: Progressing   .

## 2024-05-30 NOTE — PROGRESS NOTES
05/30/24 1026   Discharge Planning   Living Arrangements Spouse/significant other   Support Systems Spouse/significant other   Assistance Needed X3, independent in ADLs, ambulates without assistive devices, drives, No O2, CPAP or Bipap at baseline   Type of Residence Private residence   Do you have animals or pets at home? No   Who is requesting discharge planning? Patient   Home or Post Acute Services None   Patient expects to be discharged to: Patient aware of dc today. Spoke with wife and she is aware of dc and is coming in to transport. IMM obtained from patient. Wife given community support brochures   Financial Resource Strain   How hard is it for you to pay for the very basics like food, housing, medical care, and heating? Not very   Housing Stability   In the last 12 months, was there a time when you were not able to pay the mortgage or rent on time? N   In the last 12 months, how many places have you lived? 1   In the last 12 months, was there a time when you did not have a steady place to sleep or slept in a shelter (including now)? N   Transportation Needs   In the past 12 months, has lack of transportation kept you from medical appointments or from getting medications? no   In the past 12 months, has lack of transportation kept you from meetings, work, or from getting things needed for daily living? No

## 2024-05-30 NOTE — TELEPHONE ENCOUNTER
Pts wife calling to give you an update. Bill was kept over night and being released with a new onset of dementia. I scheduled pt FU appt next week.

## 2024-05-30 NOTE — NURSING NOTE
Discharge discussed with patient's wife. Questions answered. IVS removed on patient. Patient stable.

## 2024-05-31 ENCOUNTER — PATIENT OUTREACH (OUTPATIENT)
Dept: PRIMARY CARE | Facility: CLINIC | Age: 79
End: 2024-05-31
Payer: MEDICARE

## 2024-05-31 NOTE — SIGNIFICANT EVENT
Follow Up Phone Call    Outgoing phone call    Spoke to: Bret Rowland Relationship:spouse   Phone number: [phone]      Outcome: contacted patient/ family   Chief Complaint   Patient presents with    Weakness, Gen     Sent in from PCP to be worked up for weakness, confusion, diarrhea and paleness.  Concerned for bleeding.          Diagnosis:Not applicable    States he is sleeping a lot. He sees his PCP next week. No further questions or concerns.

## 2024-05-31 NOTE — PROGRESS NOTES
Discharge Facility: [facility]  Discharge Diagnosis: Generalized Weakness  Admission Date: 5/29/24  Discharge Date: 5/30/24    PCP Appointment Date: 6/5/24  Specialist Appointment Date: 8/9/24 (Rheumatology)  Hospital Encounter and Summary: Linked     Two attempts were made to reach patient within two business days after discharge. Voicemail left with contact information for patient to call back with any non-emergent questions or concerns.    Stiven Haley LPN

## 2024-06-05 ENCOUNTER — OFFICE VISIT (OUTPATIENT)
Dept: PRIMARY CARE | Facility: CLINIC | Age: 79
End: 2024-06-05
Payer: MEDICARE

## 2024-06-05 ENCOUNTER — TELEPHONE (OUTPATIENT)
Dept: PRIMARY CARE | Facility: CLINIC | Age: 79
End: 2024-06-05

## 2024-06-05 VITALS
TEMPERATURE: 98.1 F | OXYGEN SATURATION: 97 % | HEART RATE: 100 BPM | WEIGHT: 160 LBS | HEIGHT: 68 IN | BODY MASS INDEX: 24.25 KG/M2

## 2024-06-05 DIAGNOSIS — E78.2 MIXED HYPERLIPIDEMIA: Primary | Chronic | ICD-10-CM

## 2024-06-05 DIAGNOSIS — K86.2 PANCREATIC CYST (HHS-HCC): Chronic | ICD-10-CM

## 2024-06-05 DIAGNOSIS — D64.9 ANEMIA, UNSPECIFIED TYPE: Chronic | ICD-10-CM

## 2024-06-05 DIAGNOSIS — K85.90 ACUTE PANCREATITIS, UNSPECIFIED COMPLICATION STATUS, UNSPECIFIED PANCREATITIS TYPE (HHS-HCC): ICD-10-CM

## 2024-06-05 DIAGNOSIS — Z09 HOSPITAL DISCHARGE FOLLOW-UP: ICD-10-CM

## 2024-06-05 DIAGNOSIS — K59.1 FUNCTIONAL DIARRHEA: ICD-10-CM

## 2024-06-05 DIAGNOSIS — F03.90 DEMENTIA, UNSPECIFIED DEMENTIA SEVERITY, UNSPECIFIED DEMENTIA TYPE, UNSPECIFIED WHETHER BEHAVIORAL, PSYCHOTIC, OR MOOD DISTURBANCE OR ANXIETY (MULTI): Primary | ICD-10-CM

## 2024-06-05 PROCEDURE — 1111F DSCHRG MED/CURRENT MED MERGE: CPT | Performed by: FAMILY MEDICINE

## 2024-06-05 PROCEDURE — 1036F TOBACCO NON-USER: CPT | Performed by: FAMILY MEDICINE

## 2024-06-05 PROCEDURE — 1160F RVW MEDS BY RX/DR IN RCRD: CPT | Performed by: FAMILY MEDICINE

## 2024-06-05 PROCEDURE — 1125F AMNT PAIN NOTED PAIN PRSNT: CPT | Performed by: FAMILY MEDICINE

## 2024-06-05 PROCEDURE — 1159F MED LIST DOCD IN RCRD: CPT | Performed by: FAMILY MEDICINE

## 2024-06-05 PROCEDURE — 99496 TRANSJ CARE MGMT HIGH F2F 7D: CPT | Performed by: FAMILY MEDICINE

## 2024-06-05 PROCEDURE — 1157F ADVNC CARE PLAN IN RCRD: CPT | Performed by: FAMILY MEDICINE

## 2024-06-05 RX ORDER — COLESEVELAM 180 1/1
TABLET ORAL
Qty: 180 TABLET | Refills: 11 | Status: SHIPPED | OUTPATIENT
Start: 2024-06-05 | End: 2024-06-05 | Stop reason: SDUPTHER

## 2024-06-05 RX ORDER — COLESEVELAM 180 1/1
TABLET ORAL
Qty: 120 TABLET | Refills: 5 | Status: SHIPPED | OUTPATIENT
Start: 2024-06-05

## 2024-06-05 ASSESSMENT — PAIN SCALES - GENERAL: PAINLEVEL: 8

## 2024-06-05 ASSESSMENT — COLUMBIA-SUICIDE SEVERITY RATING SCALE - C-SSRS
2. HAVE YOU ACTUALLY HAD ANY THOUGHTS OF KILLING YOURSELF?: NO
6. HAVE YOU EVER DONE ANYTHING, STARTED TO DO ANYTHING, OR PREPARED TO DO ANYTHING TO END YOUR LIFE?: NO
1. IN THE PAST MONTH, HAVE YOU WISHED YOU WERE DEAD OR WISHED YOU COULD GO TO SLEEP AND NOT WAKE UP?: NO

## 2024-06-05 ASSESSMENT — ENCOUNTER SYMPTOMS
COUGH: 0
SHORTNESS OF BREATH: 0
ABDOMINAL DISTENTION: 0
OCCASIONAL FEELINGS OF UNSTEADINESS: 1
PALPITATIONS: 0
LOSS OF SENSATION IN FEET: 0
DEPRESSION: 0

## 2024-06-05 NOTE — TELEPHONE ENCOUNTER
Welchol. It says to take with meal(s) and pharmacy wants to clarify if patient should take with ALL meaals (3x's day) or just with one meal.  Thank you

## 2024-06-05 NOTE — PROGRESS NOTES
"Subjective   Patient ID: Oren Rowland is a 78 y.o. male who presents for [facility] follow up for dementia.    HPI     Review of Systems    Objective   Pulse 100   Temp 36.7 °C (98.1 °F)   Ht 1.727 m (5' 8\")   Wt 72.6 kg (160 lb)   SpO2 97%   BMI 24.33 kg/m²     Physical Exam    Assessment/Plan          "

## 2024-06-05 NOTE — PROGRESS NOTES
"Patient: Bret Martinez"Oren\"  : 1945  PCP: Justin Gallo MD  MRN: [id number]  Program: Transitional Care Management  Status: Enrolled  Effective Dates: 2024 - present  Responsible Staff: Stiven Haley LPN  Social Determinants to be Addressed: Physical Activity, Social Connections, Stress, Tobacco Use         Bret Almazan" is a 78 y.o. male presenting today for follow-up after being discharged from the hospital 7 days ago. The main problem requiring admission was dementia. The discharge summary and/or Transitional Care Management documentation was reviewed. Medication reconciliation was performed as indicated via the \"Bernardo as Reviewed\" timestamp.     Bret Martinez"Oren\" was contacted by Transitional Care Management services two days after his discharge. This encounter and supporting documentation was reviewed.    Review of Systems   Respiratory:  Negative for cough and shortness of breath.    Cardiovascular:  Negative for chest pain and palpitations.   Gastrointestinal:  Negative for abdominal distention.   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    Pulse 100   Temp 36.7 °C (98.1 °F)   Ht 1.727 m (5' 8\")   Wt 72.6 kg (160 lb)   SpO2 97%   BMI 24.33 kg/m²     Physical Exam  Vitals reviewed.   Constitutional:       Appearance: Normal appearance.   Cardiovascular:      Rate and Rhythm: Normal rate and regular rhythm.      Heart sounds: Normal heart sounds. No murmur heard.  Pulmonary:      Breath sounds: Normal breath sounds.   Abdominal:      General: Abdomen is flat. Bowel sounds are normal.      Palpations: Abdomen is soft.   Musculoskeletal:         General: No swelling.   Neurological:      Mental Status: He is alert.         The complexity of medical " decision making for this patient's transitional care is high    Assessment/Plan   Diagnoses and all orders for this visit:  Dementia, unspecified dementia severity, unspecified dementia type, unspecified whether behavioral, psychotic, or mood disturbance or anxiety (Multi)  Hospital discharge follow-up  Anemia, unspecified type    Refer to neurology for dementia workup  Refer to GI with diarrhea  Labs as directed rule out infectious etiology  Will try WelChol 3 tablets twice a day.  Avoid alcohol  Return to office in 1 month for reevaluation

## 2024-06-12 ENCOUNTER — TELEPHONE (OUTPATIENT)
Dept: RHEUMATOLOGY | Facility: CLINIC | Age: 79
End: 2024-06-12
Payer: MEDICARE

## 2024-06-12 ENCOUNTER — PATIENT OUTREACH (OUTPATIENT)
Dept: PRIMARY CARE | Facility: CLINIC | Age: 79
End: 2024-06-12
Payer: MEDICARE

## 2024-06-12 NOTE — PROGRESS NOTES
Unable to reach patient for call back after patient's follow up appointment with PCP.   LVM with call back number for patient to call if needed   If no voicemail available call attempts x 2 were made to contact the patient to assist with any questions or concerns patient may have.    Stiven Haley LPN

## 2024-06-13 ENCOUNTER — PATIENT MESSAGE (OUTPATIENT)
Dept: RHEUMATOLOGY | Facility: CLINIC | Age: 79
End: 2024-06-13
Payer: MEDICARE

## 2024-06-16 NOTE — TELEPHONE ENCOUNTER
I did send patient a [employer] message letting him know this and to let us know if he would like to retry medication

## 2024-06-28 ENCOUNTER — PATIENT OUTREACH (OUTPATIENT)
Dept: PRIMARY CARE | Facility: CLINIC | Age: 79
End: 2024-06-28
Payer: MEDICARE

## 2024-07-02 ENCOUNTER — HOSPITAL ENCOUNTER (OUTPATIENT)
Dept: RADIOLOGY | Facility: HOSPITAL | Age: 79
Discharge: HOME | End: 2024-07-02
Payer: MEDICARE

## 2024-07-02 ENCOUNTER — LAB (OUTPATIENT)
Dept: LAB | Facility: LAB | Age: 79
End: 2024-07-02
Payer: MEDICARE

## 2024-07-02 ENCOUNTER — ANCILLARY ORDERS (OUTPATIENT)
Dept: PRIMARY CARE | Facility: CLINIC | Age: 79
End: 2024-07-02

## 2024-07-02 DIAGNOSIS — K59.1 FUNCTIONAL DIARRHEA: ICD-10-CM

## 2024-07-02 DIAGNOSIS — D64.9 ANEMIA, UNSPECIFIED TYPE: Chronic | ICD-10-CM

## 2024-07-02 DIAGNOSIS — Z09 HOSPITAL DISCHARGE FOLLOW-UP: ICD-10-CM

## 2024-07-02 DIAGNOSIS — F03.90 DEMENTIA, UNSPECIFIED DEMENTIA SEVERITY, UNSPECIFIED DEMENTIA TYPE, UNSPECIFIED WHETHER BEHAVIORAL, PSYCHOTIC, OR MOOD DISTURBANCE OR ANXIETY (MULTI): Primary | ICD-10-CM

## 2024-07-02 DIAGNOSIS — K85.90 ACUTE PANCREATITIS, UNSPECIFIED COMPLICATION STATUS, UNSPECIFIED PANCREATITIS TYPE (HHS-HCC): ICD-10-CM

## 2024-07-02 DIAGNOSIS — K86.2 PANCREATIC CYST (HHS-HCC): Chronic | ICD-10-CM

## 2024-07-02 LAB
ALBUMIN SERPL BCP-MCNC: 3.8 G/DL (ref 3.4–5)
ALP SERPL-CCNC: 105 U/L (ref 33–136)
ALT SERPL W P-5'-P-CCNC: 56 U/L (ref 10–52)
AMYLASE SERPL-CCNC: 87 U/L (ref 29–103)
ANION GAP SERPL CALC-SCNC: 14 MMOL/L (ref 10–20)
AST SERPL W P-5'-P-CCNC: 83 U/L (ref 9–39)
BILIRUB SERPL-MCNC: 0.4 MG/DL (ref 0–1.2)
BUN SERPL-MCNC: 18 MG/DL (ref 6–23)
CALCIUM SERPL-MCNC: 8.7 MG/DL (ref 8.6–10.3)
CHLORIDE SERPL-SCNC: 104 MMOL/L (ref 98–107)
CO2 SERPL-SCNC: 24 MMOL/L (ref 21–32)
CREAT SERPL-MCNC: 0.91 MG/DL (ref 0.5–1.3)
EGFRCR SERPLBLD CKD-EPI 2021: 86 ML/MIN/1.73M*2
ERYTHROCYTE [SEDIMENTATION RATE] IN BLOOD BY WESTERGREN METHOD: 23 MM/H (ref 0–20)
GLUCOSE SERPL-MCNC: 97 MG/DL (ref 74–99)
LIPASE SERPL-CCNC: 77 U/L (ref 9–82)
POTASSIUM SERPL-SCNC: 4.2 MMOL/L (ref 3.5–5.3)
PROT SERPL-MCNC: 6 G/DL (ref 6.4–8.2)
SODIUM SERPL-SCNC: 138 MMOL/L (ref 136–145)

## 2024-07-02 PROCEDURE — 80053 COMPREHEN METABOLIC PANEL: CPT

## 2024-07-02 PROCEDURE — 83690 ASSAY OF LIPASE: CPT

## 2024-07-02 PROCEDURE — 74183 MRI ABD W/O CNTR FLWD CNTR: CPT

## 2024-07-02 PROCEDURE — 74183 MRI ABD W/O CNTR FLWD CNTR: CPT | Performed by: RADIOLOGY

## 2024-07-02 PROCEDURE — 2550000001 HC RX 255 CONTRASTS: Performed by: FAMILY MEDICINE

## 2024-07-02 PROCEDURE — 85652 RBC SED RATE AUTOMATED: CPT

## 2024-07-02 PROCEDURE — A9575 INJ GADOTERATE MEGLUMI 0.1ML: HCPCS | Performed by: FAMILY MEDICINE

## 2024-07-02 PROCEDURE — 36415 COLL VENOUS BLD VENIPUNCTURE: CPT

## 2024-07-02 PROCEDURE — 82150 ASSAY OF AMYLASE: CPT

## 2024-07-02 RX ORDER — GADOTERATE MEGLUMINE 376.9 MG/ML
14 INJECTION INTRAVENOUS
Status: COMPLETED | OUTPATIENT
Start: 2024-07-02 | End: 2024-07-02

## 2024-07-03 ENCOUNTER — PATIENT MESSAGE (OUTPATIENT)
Dept: RHEUMATOLOGY | Facility: CLINIC | Age: 79
End: 2024-07-03
Payer: MEDICARE

## 2024-07-03 ENCOUNTER — HOSPITAL ENCOUNTER (OUTPATIENT)
Facility: HOSPITAL | Age: 79
Setting detail: OBSERVATION
Discharge: HOME | End: 2024-07-06
Attending: EMERGENCY MEDICINE | Admitting: INTERNAL MEDICINE
Payer: MEDICARE

## 2024-07-03 DIAGNOSIS — I10 PRIMARY HYPERTENSION: ICD-10-CM

## 2024-07-03 DIAGNOSIS — K59.1 FUNCTIONAL DIARRHEA: ICD-10-CM

## 2024-07-03 DIAGNOSIS — R11.2 NAUSEA AND VOMITING, UNSPECIFIED VOMITING TYPE: ICD-10-CM

## 2024-07-03 DIAGNOSIS — M13.80 SERONEGATIVE ARTHRITIS: Primary | ICD-10-CM

## 2024-07-03 DIAGNOSIS — R19.7 DIARRHEA, UNSPECIFIED TYPE: Primary | ICD-10-CM

## 2024-07-03 LAB
ALBUMIN SERPL BCP-MCNC: 4.3 G/DL (ref 3.4–5)
ALP SERPL-CCNC: 114 U/L (ref 33–136)
ALT SERPL W P-5'-P-CCNC: 55 U/L (ref 10–52)
ANION GAP SERPL CALC-SCNC: 21 MMOL/L (ref 10–20)
APPEARANCE UR: CLEAR
AST SERPL W P-5'-P-CCNC: 67 U/L (ref 9–39)
BASOPHILS # BLD AUTO: 0.06 X10*3/UL (ref 0–0.1)
BASOPHILS NFR BLD AUTO: 0.8 %
BILIRUB SERPL-MCNC: 0.5 MG/DL (ref 0–1.2)
BILIRUB UR STRIP.AUTO-MCNC: NEGATIVE MG/DL
BUN SERPL-MCNC: 23 MG/DL (ref 6–23)
CALCIUM SERPL-MCNC: 9.8 MG/DL (ref 8.6–10.3)
CHLORIDE SERPL-SCNC: 100 MMOL/L (ref 98–107)
CO2 SERPL-SCNC: 20 MMOL/L (ref 21–32)
COLOR UR: YELLOW
CREAT SERPL-MCNC: 1.52 MG/DL (ref 0.5–1.3)
EGFRCR SERPLBLD CKD-EPI 2021: 46 ML/MIN/1.73M*2
EOSINOPHIL # BLD AUTO: 0.3 X10*3/UL (ref 0–0.4)
EOSINOPHIL NFR BLD AUTO: 3.9 %
ERYTHROCYTE [DISTWIDTH] IN BLOOD BY AUTOMATED COUNT: 19.1 % (ref 11.5–14.5)
GLUCOSE SERPL-MCNC: 136 MG/DL (ref 74–99)
GLUCOSE UR STRIP.AUTO-MCNC: NEGATIVE MG/DL
HCT VFR BLD AUTO: 32.2 % (ref 41–52)
HGB BLD-MCNC: 9.9 G/DL (ref 13.5–17.5)
HYALINE CASTS #/AREA URNS AUTO: ABNORMAL /LPF
IMM GRANULOCYTES # BLD AUTO: 0.01 X10*3/UL (ref 0–0.5)
IMM GRANULOCYTES NFR BLD AUTO: 0.1 % (ref 0–0.9)
KETONES UR STRIP.AUTO-MCNC: NEGATIVE MG/DL
LACTATE SERPL-SCNC: 1.2 MMOL/L (ref 0.4–2)
LACTATE SERPL-SCNC: 3.4 MMOL/L (ref 0.4–2)
LACTATE SERPL-SCNC: 4 MMOL/L (ref 0.4–2)
LEUKOCYTE ESTERASE UR QL STRIP.AUTO: NEGATIVE
LIPASE SERPL-CCNC: 94 U/L (ref 9–82)
LYMPHOCYTES # BLD AUTO: 0.62 X10*3/UL (ref 0.8–3)
LYMPHOCYTES NFR BLD AUTO: 8.1 %
MAGNESIUM SERPL-MCNC: 1.78 MG/DL (ref 1.6–2.4)
MCH RBC QN AUTO: 28.4 PG (ref 26–34)
MCHC RBC AUTO-ENTMCNC: 30.7 G/DL (ref 32–36)
MCV RBC AUTO: 93 FL (ref 80–100)
MONOCYTES # BLD AUTO: 0.8 X10*3/UL (ref 0.05–0.8)
MONOCYTES NFR BLD AUTO: 10.5 %
NEUTROPHILS # BLD AUTO: 5.85 X10*3/UL (ref 1.6–5.5)
NEUTROPHILS NFR BLD AUTO: 76.6 %
NITRITE UR QL STRIP.AUTO: NEGATIVE
NRBC BLD-RTO: 0 /100 WBCS (ref 0–0)
PH UR STRIP.AUTO: 5.5 [PH]
PLATELET # BLD AUTO: 174 X10*3/UL (ref 150–450)
POTASSIUM SERPL-SCNC: 4.6 MMOL/L (ref 3.5–5.3)
PROT SERPL-MCNC: 7.4 G/DL (ref 6.4–8.2)
PROT UR STRIP.AUTO-MCNC: ABNORMAL MG/DL
RBC # BLD AUTO: 3.48 X10*6/UL (ref 4.5–5.9)
RBC # UR STRIP.AUTO: NEGATIVE /UL
RBC #/AREA URNS AUTO: ABNORMAL /HPF
SODIUM SERPL-SCNC: 136 MMOL/L (ref 136–145)
SP GR UR STRIP.AUTO: >1.03
UROBILINOGEN UR STRIP.AUTO-MCNC: ABNORMAL MG/DL
WBC # BLD AUTO: 7.6 X10*3/UL (ref 4.4–11.3)
WBC #/AREA URNS AUTO: ABNORMAL /HPF

## 2024-07-03 PROCEDURE — 96361 HYDRATE IV INFUSION ADD-ON: CPT

## 2024-07-03 PROCEDURE — 83605 ASSAY OF LACTIC ACID: CPT | Performed by: PHYSICIAN ASSISTANT

## 2024-07-03 PROCEDURE — 36415 COLL VENOUS BLD VENIPUNCTURE: CPT | Performed by: PHYSICIAN ASSISTANT

## 2024-07-03 PROCEDURE — 87506 IADNA-DNA/RNA PROBE TQ 6-11: CPT | Mod: GEALAB | Performed by: PHYSICIAN ASSISTANT

## 2024-07-03 PROCEDURE — G0378 HOSPITAL OBSERVATION PER HR: HCPCS

## 2024-07-03 PROCEDURE — 96372 THER/PROPH/DIAG INJ SC/IM: CPT | Performed by: PHYSICIAN ASSISTANT

## 2024-07-03 PROCEDURE — 2500000001 HC RX 250 WO HCPCS SELF ADMINISTERED DRUGS (ALT 637 FOR MEDICARE OP): Performed by: PHYSICIAN ASSISTANT

## 2024-07-03 PROCEDURE — 87493 C DIFF AMPLIFIED PROBE: CPT | Mod: GEALAB | Performed by: PHYSICIAN ASSISTANT

## 2024-07-03 PROCEDURE — 85025 COMPLETE CBC W/AUTO DIFF WBC: CPT | Performed by: PHYSICIAN ASSISTANT

## 2024-07-03 PROCEDURE — 83605 ASSAY OF LACTIC ACID: CPT

## 2024-07-03 PROCEDURE — 96372 THER/PROPH/DIAG INJ SC/IM: CPT

## 2024-07-03 PROCEDURE — 99285 EMERGENCY DEPT VISIT HI MDM: CPT | Mod: 25

## 2024-07-03 PROCEDURE — 99222 1ST HOSP IP/OBS MODERATE 55: CPT

## 2024-07-03 PROCEDURE — 96374 THER/PROPH/DIAG INJ IV PUSH: CPT

## 2024-07-03 PROCEDURE — 83735 ASSAY OF MAGNESIUM: CPT | Performed by: PHYSICIAN ASSISTANT

## 2024-07-03 PROCEDURE — 2500000004 HC RX 250 GENERAL PHARMACY W/ HCPCS (ALT 636 FOR OP/ED)

## 2024-07-03 PROCEDURE — 81001 URINALYSIS AUTO W/SCOPE: CPT | Performed by: PHYSICIAN ASSISTANT

## 2024-07-03 PROCEDURE — 83690 ASSAY OF LIPASE: CPT | Performed by: PHYSICIAN ASSISTANT

## 2024-07-03 PROCEDURE — 2500000004 HC RX 250 GENERAL PHARMACY W/ HCPCS (ALT 636 FOR OP/ED): Performed by: PHYSICIAN ASSISTANT

## 2024-07-03 PROCEDURE — 80053 COMPREHEN METABOLIC PANEL: CPT | Performed by: PHYSICIAN ASSISTANT

## 2024-07-03 RX ORDER — ACETAMINOPHEN 500 MG
5 TABLET ORAL NIGHTLY PRN
Status: DISCONTINUED | OUTPATIENT
Start: 2024-07-03 | End: 2024-07-06 | Stop reason: HOSPADM

## 2024-07-03 RX ORDER — ENOXAPARIN SODIUM 100 MG/ML
40 INJECTION SUBCUTANEOUS EVERY 24 HOURS
Status: DISCONTINUED | OUTPATIENT
Start: 2024-07-03 | End: 2024-07-06 | Stop reason: HOSPADM

## 2024-07-03 RX ORDER — AZATHIOPRINE 50 MG/1
50 TABLET ORAL 2 TIMES DAILY
Qty: 60 TABLET | Refills: 5 | Status: SHIPPED | OUTPATIENT
Start: 2024-07-03 | End: 2024-07-06 | Stop reason: HOSPADM

## 2024-07-03 RX ORDER — COLESEVELAM 180 1/1
625 TABLET ORAL DAILY
Status: DISCONTINUED | OUTPATIENT
Start: 2024-07-04 | End: 2024-07-06 | Stop reason: HOSPADM

## 2024-07-03 RX ORDER — AZATHIOPRINE 50 MG/1
50 TABLET ORAL 2 TIMES DAILY
Status: DISCONTINUED | OUTPATIENT
Start: 2024-07-03 | End: 2024-07-05

## 2024-07-03 RX ORDER — ONDANSETRON 4 MG/1
4 TABLET, ORALLY DISINTEGRATING ORAL EVERY 8 HOURS PRN
Status: DISCONTINUED | OUTPATIENT
Start: 2024-07-03 | End: 2024-07-06 | Stop reason: HOSPADM

## 2024-07-03 RX ORDER — PANTOPRAZOLE SODIUM 40 MG/1
40 TABLET, DELAYED RELEASE ORAL
Status: DISCONTINUED | OUTPATIENT
Start: 2024-07-04 | End: 2024-07-06 | Stop reason: HOSPADM

## 2024-07-03 RX ORDER — DICYCLOMINE HYDROCHLORIDE 20 MG/1
20 TABLET ORAL 4 TIMES DAILY PRN
Status: DISCONTINUED | OUTPATIENT
Start: 2024-07-03 | End: 2024-07-06 | Stop reason: HOSPADM

## 2024-07-03 RX ORDER — CHOLECALCIFEROL (VITAMIN D3) 25 MCG
2000 TABLET ORAL DAILY
Status: DISCONTINUED | OUTPATIENT
Start: 2024-07-04 | End: 2024-07-06 | Stop reason: HOSPADM

## 2024-07-03 RX ORDER — LISINOPRIL 10 MG/1
10 TABLET ORAL DAILY
Status: DISCONTINUED | OUTPATIENT
Start: 2024-07-04 | End: 2024-07-06 | Stop reason: HOSPADM

## 2024-07-03 RX ORDER — DICYCLOMINE HYDROCHLORIDE 10 MG/ML
20 INJECTION INTRAMUSCULAR ONCE
Status: COMPLETED | OUTPATIENT
Start: 2024-07-03 | End: 2024-07-03

## 2024-07-03 RX ORDER — ONDANSETRON HYDROCHLORIDE 2 MG/ML
4 INJECTION, SOLUTION INTRAVENOUS EVERY 8 HOURS PRN
Status: DISCONTINUED | OUTPATIENT
Start: 2024-07-03 | End: 2024-07-06 | Stop reason: HOSPADM

## 2024-07-03 RX ORDER — LOPERAMIDE HYDROCHLORIDE 2 MG/1
2 CAPSULE ORAL ONCE
Status: COMPLETED | OUTPATIENT
Start: 2024-07-03 | End: 2024-07-03

## 2024-07-03 RX ORDER — ONDANSETRON HYDROCHLORIDE 2 MG/ML
4 INJECTION, SOLUTION INTRAVENOUS ONCE
Status: COMPLETED | OUTPATIENT
Start: 2024-07-03 | End: 2024-07-03

## 2024-07-03 SDOH — SOCIAL STABILITY: SOCIAL INSECURITY: ABUSE: ADULT

## 2024-07-03 SDOH — SOCIAL STABILITY: SOCIAL INSECURITY: ARE YOU OR HAVE YOU BEEN THREATENED OR ABUSED PHYSICALLY, EMOTIONALLY, OR SEXUALLY BY ANYONE?: NO

## 2024-07-03 SDOH — SOCIAL STABILITY: SOCIAL INSECURITY: DO YOU FEEL UNSAFE GOING BACK TO THE PLACE WHERE YOU ARE LIVING?: NO

## 2024-07-03 SDOH — SOCIAL STABILITY: SOCIAL INSECURITY: DO YOU FEEL ANYONE HAS EXPLOITED OR TAKEN ADVANTAGE OF YOU FINANCIALLY OR OF YOUR PERSONAL PROPERTY?: NO

## 2024-07-03 SDOH — SOCIAL STABILITY: SOCIAL INSECURITY: HAVE YOU HAD ANY THOUGHTS OF HARMING ANYONE ELSE?: NO

## 2024-07-03 SDOH — SOCIAL STABILITY: SOCIAL INSECURITY: ARE THERE ANY APPARENT SIGNS OF INJURIES/BEHAVIORS THAT COULD BE RELATED TO ABUSE/NEGLECT?: NO

## 2024-07-03 SDOH — SOCIAL STABILITY: SOCIAL INSECURITY: HAVE YOU HAD THOUGHTS OF HARMING ANYONE ELSE?: NO

## 2024-07-03 SDOH — SOCIAL STABILITY: SOCIAL INSECURITY: HAS ANYONE EVER THREATENED TO HURT YOUR FAMILY OR YOUR PETS?: NO

## 2024-07-03 SDOH — SOCIAL STABILITY: SOCIAL INSECURITY: WERE YOU ABLE TO COMPLETE ALL THE BEHAVIORAL HEALTH SCREENINGS?: YES

## 2024-07-03 SDOH — SOCIAL STABILITY: SOCIAL INSECURITY: DOES ANYONE TRY TO KEEP YOU FROM HAVING/CONTACTING OTHER FRIENDS OR DOING THINGS OUTSIDE YOUR HOME?: NO

## 2024-07-03 ASSESSMENT — PAIN - FUNCTIONAL ASSESSMENT
PAIN_FUNCTIONAL_ASSESSMENT: 0-10
PAIN_FUNCTIONAL_ASSESSMENT: 0-10

## 2024-07-03 ASSESSMENT — ACTIVITIES OF DAILY LIVING (ADL)
JUDGMENT_ADEQUATE_SAFELY_COMPLETE_DAILY_ACTIVITIES: YES
HEARING - RIGHT EAR: FUNCTIONAL
BATHING: INDEPENDENT
DRESSING YOURSELF: INDEPENDENT
TOILETING: INDEPENDENT
HEARING - LEFT EAR: FUNCTIONAL
PATIENT'S MEMORY ADEQUATE TO SAFELY COMPLETE DAILY ACTIVITIES?: YES
ASSISTIVE_DEVICE: DENTURES LOWER;DENTURES UPPER;EYEGLASSES
LACK_OF_TRANSPORTATION: NO
ADEQUATE_TO_COMPLETE_ADL: YES
FEEDING YOURSELF: INDEPENDENT
WALKS IN HOME: INDEPENDENT
GROOMING: INDEPENDENT

## 2024-07-03 ASSESSMENT — LIFESTYLE VARIABLES
SKIP TO QUESTIONS 9-10: 1
HOW OFTEN DO YOU HAVE 6 OR MORE DRINKS ON ONE OCCASION: NEVER
HOW OFTEN DO YOU HAVE A DRINK CONTAINING ALCOHOL: NEVER
EVER FELT BAD OR GUILTY ABOUT YOUR DRINKING: NO
AUDIT-C TOTAL SCORE: 0
HAVE YOU EVER FELT YOU SHOULD CUT DOWN ON YOUR DRINKING: NO
TOTAL SCORE: 0
AUDIT-C TOTAL SCORE: 0
HOW MANY STANDARD DRINKS CONTAINING ALCOHOL DO YOU HAVE ON A TYPICAL DAY: PATIENT DOES NOT DRINK
EVER HAD A DRINK FIRST THING IN THE MORNING TO STEADY YOUR NERVES TO GET RID OF A HANGOVER: NO
HAVE PEOPLE ANNOYED YOU BY CRITICIZING YOUR DRINKING: NO

## 2024-07-03 ASSESSMENT — COGNITIVE AND FUNCTIONAL STATUS - GENERAL
MOBILITY SCORE: 24
PATIENT BASELINE BEDBOUND: NO
DAILY ACTIVITIY SCORE: 24

## 2024-07-03 ASSESSMENT — PAIN DESCRIPTION - PAIN TYPE: TYPE: ACUTE PAIN

## 2024-07-03 ASSESSMENT — ENCOUNTER SYMPTOMS
VOMITING: 1
CHILLS: 0
NAUSEA: 1
FEVER: 0
DIARRHEA: 1

## 2024-07-03 ASSESSMENT — PAIN SCALES - GENERAL
PAINLEVEL_OUTOF10: 3
PAINLEVEL_OUTOF10: 0 - NO PAIN

## 2024-07-03 ASSESSMENT — PATIENT HEALTH QUESTIONNAIRE - PHQ9
SUM OF ALL RESPONSES TO PHQ9 QUESTIONS 1 & 2: 0
2. FEELING DOWN, DEPRESSED OR HOPELESS: NOT AT ALL
1. LITTLE INTEREST OR PLEASURE IN DOING THINGS: NOT AT ALL

## 2024-07-03 ASSESSMENT — COLUMBIA-SUICIDE SEVERITY RATING SCALE - C-SSRS
2. HAVE YOU ACTUALLY HAD ANY THOUGHTS OF KILLING YOURSELF?: NO
1. IN THE PAST MONTH, HAVE YOU WISHED YOU WERE DEAD OR WISHED YOU COULD GO TO SLEEP AND NOT WAKE UP?: NO
6. HAVE YOU EVER DONE ANYTHING, STARTED TO DO ANYTHING, OR PREPARED TO DO ANYTHING TO END YOUR LIFE?: NO

## 2024-07-03 ASSESSMENT — PAIN DESCRIPTION - DESCRIPTORS: DESCRIPTORS: CRAMPING

## 2024-07-03 ASSESSMENT — PAIN DESCRIPTION - LOCATION: LOCATION: ABDOMEN

## 2024-07-03 NOTE — ED TRIAGE NOTES
Patient c/o abdominal cramping with nausea, vomiting and diarrhea that started today at noon. Patients wife states he was in the hospital and discharged home 2 weeks ago, since then he has not been really eating or drinking much. Patient states he does feel lightheaded when he stands up.

## 2024-07-03 NOTE — PROGRESS NOTES
The patient was seen by the midlevel/resident.  I have personally saw the patient and made/approved the management plan and take responsibility for the patient management.  I reviewed the EKG's (when done) and agree with the interpretation.  I have seen and examined the patient; agree with the workup, evaluation, MDM, and diagnosis.  The care plan has been discussed with the midlevel/resident; I have reviewed the note and agree with the documented findings.     Presents ED with complaint of diarrhea.  Patient had diarrhea for a number of years now and has been hospitalized in the past for it.  They have been able to find a cause.  He has tried Imodium and that made things worse.  He had some vomiting and arrives tachycardic.  He thinks he is dehydrated.  He was worked up in the ED with labs given fluids antiemetics.  Spoke to patient and family at bedside anticipate hospitalization overnight for hydration.  Review the MRI done yesterday and recent labs.  Diagnoses as of 07/03/24 2222   Diarrhea, unspecified type   Nausea and vomiting, unspecified vomiting type     Justin Marte MD

## 2024-07-04 LAB
ALBUMIN SERPL BCP-MCNC: 3.4 G/DL (ref 3.4–5)
ALP SERPL-CCNC: 90 U/L (ref 33–136)
ALT SERPL W P-5'-P-CCNC: 37 U/L (ref 10–52)
ANION GAP SERPL CALC-SCNC: 13 MMOL/L (ref 10–20)
AST SERPL W P-5'-P-CCNC: 42 U/L (ref 9–39)
BILIRUB DIRECT SERPL-MCNC: 0.1 MG/DL (ref 0–0.3)
BILIRUB SERPL-MCNC: 0.6 MG/DL (ref 0–1.2)
BUN SERPL-MCNC: 24 MG/DL (ref 6–23)
C DIF TOX TCDA+TCDB STL QL NAA+PROBE: NOT DETECTED
CALCIUM SERPL-MCNC: 7.9 MG/DL (ref 8.6–10.3)
CHLORIDE SERPL-SCNC: 106 MMOL/L (ref 98–107)
CO2 SERPL-SCNC: 22 MMOL/L (ref 21–32)
CREAT SERPL-MCNC: 1.43 MG/DL (ref 0.5–1.3)
EGFRCR SERPLBLD CKD-EPI 2021: 50 ML/MIN/1.73M*2
ERYTHROCYTE [DISTWIDTH] IN BLOOD BY AUTOMATED COUNT: 18.9 % (ref 11.5–14.5)
GLUCOSE SERPL-MCNC: 83 MG/DL (ref 74–99)
HCT VFR BLD AUTO: 26.3 % (ref 41–52)
HGB BLD-MCNC: 8.2 G/DL (ref 13.5–17.5)
HOLD SPECIMEN: NORMAL
MAGNESIUM SERPL-MCNC: 1.81 MG/DL (ref 1.6–2.4)
MCH RBC QN AUTO: 28.6 PG (ref 26–34)
MCHC RBC AUTO-ENTMCNC: 31.2 G/DL (ref 32–36)
MCV RBC AUTO: 92 FL (ref 80–100)
NRBC BLD-RTO: 0 /100 WBCS (ref 0–0)
PHOSPHATE SERPL-MCNC: 3.7 MG/DL (ref 2.5–4.9)
PLATELET # BLD AUTO: 118 X10*3/UL (ref 150–450)
POTASSIUM SERPL-SCNC: 3.9 MMOL/L (ref 3.5–5.3)
PROT SERPL-MCNC: 5.6 G/DL (ref 6.4–8.2)
RBC # BLD AUTO: 2.87 X10*6/UL (ref 4.5–5.9)
SODIUM SERPL-SCNC: 137 MMOL/L (ref 136–145)
WBC # BLD AUTO: 5.3 X10*3/UL (ref 4.4–11.3)

## 2024-07-04 PROCEDURE — 84100 ASSAY OF PHOSPHORUS: CPT

## 2024-07-04 PROCEDURE — 82248 BILIRUBIN DIRECT: CPT

## 2024-07-04 PROCEDURE — 99232 SBSQ HOSP IP/OBS MODERATE 35: CPT | Performed by: INTERNAL MEDICINE

## 2024-07-04 PROCEDURE — G0378 HOSPITAL OBSERVATION PER HR: HCPCS

## 2024-07-04 PROCEDURE — 2500000004 HC RX 250 GENERAL PHARMACY W/ HCPCS (ALT 636 FOR OP/ED): Performed by: PHYSICIAN ASSISTANT

## 2024-07-04 PROCEDURE — 96361 HYDRATE IV INFUSION ADD-ON: CPT

## 2024-07-04 PROCEDURE — 96376 TX/PRO/DX INJ SAME DRUG ADON: CPT

## 2024-07-04 PROCEDURE — 96372 THER/PROPH/DIAG INJ SC/IM: CPT

## 2024-07-04 PROCEDURE — 2500000004 HC RX 250 GENERAL PHARMACY W/ HCPCS (ALT 636 FOR OP/ED)

## 2024-07-04 PROCEDURE — 83735 ASSAY OF MAGNESIUM: CPT

## 2024-07-04 PROCEDURE — 36415 COLL VENOUS BLD VENIPUNCTURE: CPT

## 2024-07-04 PROCEDURE — 85027 COMPLETE CBC AUTOMATED: CPT

## 2024-07-04 PROCEDURE — 2500000001 HC RX 250 WO HCPCS SELF ADMINISTERED DRUGS (ALT 637 FOR MEDICARE OP)

## 2024-07-04 PROCEDURE — 99233 SBSQ HOSP IP/OBS HIGH 50: CPT | Performed by: PHYSICIAN ASSISTANT

## 2024-07-04 PROCEDURE — 80048 BASIC METABOLIC PNL TOTAL CA: CPT

## 2024-07-04 RX ORDER — SODIUM CHLORIDE 9 MG/ML
75 INJECTION, SOLUTION INTRAVENOUS CONTINUOUS
Status: DISCONTINUED | OUTPATIENT
Start: 2024-07-04 | End: 2024-07-05

## 2024-07-04 SDOH — ECONOMIC STABILITY: INCOME INSECURITY: IN THE LAST 12 MONTHS, WAS THERE A TIME WHEN YOU WERE NOT ABLE TO PAY THE MORTGAGE OR RENT ON TIME?: NO

## 2024-07-04 SDOH — ECONOMIC STABILITY: FOOD INSECURITY: WITHIN THE PAST 12 MONTHS, YOU WORRIED THAT YOUR FOOD WOULD RUN OUT BEFORE YOU GOT MONEY TO BUY MORE.: NEVER TRUE

## 2024-07-04 SDOH — ECONOMIC STABILITY: TRANSPORTATION INSECURITY

## 2024-07-04 SDOH — ECONOMIC STABILITY: HOUSING INSECURITY: IN THE PAST 12 MONTHS HAS THE ELECTRIC, GAS, OIL, OR WATER COMPANY THREATENED TO SHUT OFF SERVICES IN YOUR HOME?: NO

## 2024-07-04 SDOH — ECONOMIC STABILITY: FOOD INSECURITY

## 2024-07-04 SDOH — ECONOMIC STABILITY: FOOD INSECURITY: WITHIN THE PAST 12 MONTHS, YOU WORRIED THAT YOUR FOOD WOULD RUN OUT BEFORE YOU GOT THE MONEY TO BUY MORE.: NEVER TRUE

## 2024-07-04 SDOH — ECONOMIC STABILITY: HOUSING INSECURITY: IN THE LAST 12 MONTHS, HOW MANY PLACES HAVE YOU LIVED?: 1

## 2024-07-04 SDOH — ECONOMIC STABILITY: HOUSING INSECURITY: IN THE LAST 12 MONTHS, WAS THERE A TIME WHEN YOU WERE NOT ABLE TO PAY THE MORTGAGE OR RENT ON TIME?: NO

## 2024-07-04 SDOH — ECONOMIC STABILITY: GENERAL

## 2024-07-04 SDOH — ECONOMIC STABILITY: FOOD INSECURITY: WITHIN THE PAST 12 MONTHS, THE FOOD YOU BOUGHT JUST DIDN'T LAST AND YOU DIDN'T HAVE MONEY TO GET MORE.: NEVER TRUE

## 2024-07-04 SDOH — ECONOMIC STABILITY: HOUSING INSECURITY

## 2024-07-04 SDOH — ECONOMIC STABILITY: TRANSPORTATION INSECURITY: IN THE PAST 12 MONTHS, HAS LACK OF TRANSPORTATION KEPT YOU FROM MEDICAL APPOINTMENTS OR FROM GETTING MEDICATIONS?: NO

## 2024-07-04 SDOH — ECONOMIC STABILITY: FOOD INSECURITY: WITHIN THE PAST 12 MONTHS, THE FOOD YOU BOUGHT JUST DIDN’T LAST AND YOU DIDN’T HAVE MONEY TO GET MORE.: NEVER TRUE

## 2024-07-04 ASSESSMENT — ACTIVITIES OF DAILY LIVING (ADL)
HEARING_LEFT_EAR: DIFFICULTY WITH NOISE
TOILETING: INDEPENDENT
HOW_WELL_CAN_YOU_BATHE_YOURSELF: INDEPENDENTLY
ADL_BEFORE_ADMISSION: LEFT
LACK_OF_TRANSPORTATION: NO
HOW_WELL_CAN_YOU_USE_BATHROOM_BY_YOURSELF: INDEPENDENTLY
ADEQUATE_TO_COMPLETE_ADL: YES
WALKS_IN_HOME: INDEPENDENTLY
BATHING: INDEPENDENT
HOW_WELL_CAN_YOU_FEED_YOURSELF: INDEPENDENTLY
HOW_WELL_CAN_YOU_DRESS_YOURSELF: INDEPENDENTLY
ADL_BEFORE_ADMISSION: INDEPENDENTLY
HOW_WELL_CAN_YOU_COMPLETE_GROOMING_TASKS: INDEPENDENTLY
FEEDING: INDEPENDENT
DRESSING: INDEPENDENT
ADEQUATE_TO_COMPLETE_ADL: YES
LACK_OF_TRANSPORTATION: NO
HEARING_RIGHT_EAR: DIFFICULTY WITH NOISE

## 2024-07-04 ASSESSMENT — COGNITIVE AND FUNCTIONAL STATUS - GENERAL
DAILY ACTIVITIY SCORE: 24
DAILY ACTIVITIY SCORE: 24
MOBILITY SCORE: 24
MOBILITY SCORE: 24

## 2024-07-04 ASSESSMENT — PAIN - FUNCTIONAL ASSESSMENT: PAIN_FUNCTIONAL_ASSESSMENT: 0-10

## 2024-07-04 ASSESSMENT — SOCIAL DETERMINANTS OF HEALTH (SDOH): IN THE PAST 12 MONTHS, HAS THE ELECTRIC, GAS, OIL, OR WATER COMPANY THREATENED TO SHUT OFF SERVICE IN YOUR HOME?: NO

## 2024-07-04 ASSESSMENT — PAIN SCALES - GENERAL
PAINLEVEL_OUTOF10: 0 - NO PAIN
PAINLEVEL_OUTOF10: 0 - NO PAIN

## 2024-07-04 NOTE — PROGRESS NOTES
07/04/24 1409   Discharge Planning   Living Arrangements Spouse/significant other   Support Systems Spouse/significant other   Assistance Needed Alert and oriented x 3, Independent with ADL's, Drives, No DME used at home.   Type of Residence Private residence   Number of Stairs to Enter Residence 0   Number of Stairs Within Residence 0   Do you have animals or pets at home? No   Who is requesting discharge planning? Provider   Home or Post Acute Services None   Patient expects to be discharged to: Home with spouse, no discharge needs identified at this time   Does the patient need discharge transport arranged? No   Financial Resource Strain   How hard is it for you to pay for the very basics like food, housing, medical care, and heating? Not hard   Housing Stability   In the last 12 months, was there a time when you were not able to pay the mortgage or rent on time? N   In the last 12 months, how many places have you lived? 1   In the last 12 months, was there a time when you did not have a steady place to sleep or slept in a shelter (including now)? N   Transportation Needs   In the past 12 months, has lack of transportation kept you from medical appointments or from getting medications? no   In the past 12 months, has lack of transportation kept you from meetings, work, or from getting things needed for daily living? No   Patient Choice   Provider Choice list and CMS website (https://medicare.gov/care-compare#search) for post-acute Quality and Resource Measure Data were provided and reviewed with: Patient   Patient / Family choosing to utilize agency / facility established prior to hospitalization No

## 2024-07-04 NOTE — CARE PLAN
The patient's goals for the shift include  no vomiting or diarrhea.    The clinical goals for the shift include less than 2 episodes of diarrhea    Over the shift, the patient did not make progress toward the following goals. Barriers to progression include none. Recommendations to address these barriers include none.

## 2024-07-04 NOTE — H&P
[facility] Internal Medicine History and Physical    History Of Present Illness  Oren Rowland is a 79 y.o. male with a past medical history of HTN, vitamin D deficiency, GERD, osteoporosis, and rheumatoid arthritis presents to [facility] ED with a chief complaint of nausea, vomiting, and diarrhea. He has been having diarrhea since yesterday around noon with episodes lasting 10-15 mins. Diarrhea is watery, but later episodes showed more color in his stool. He also had several episodes of vomiting yesterday lasting 3-4 hours. He received an MRI of his abdomen yesterday to monitor his pancreatic cystic head lesion which showed stable findings compared to 2022. He says he got 2 shots of medication (unsure which medications) and tried imodium - these medications gave him relief. His wife also notes that he has lost about 20-25lbs since June 1 due to poor appetite. Denies fevers, chills. Endorses some abdominal cramping yesterday. No recent travel, no sick contacts, no recent antibiotic usage.     12 Point ROS negative unless otherwise specified above.      ED COURSE:   Vitals: Temp 97.5, BP 88/61, , RR 20, SpO2 88% on room air     Labs  -CBC - Hgb 9.9  -CMP - Cr 1.52 (baseline 0.9-1), Alk Phos 114, ALT 55, AST 67  -UA - Specific Gravity >1.030, Protein 1+  -Lactate: 3.4 -> 4.0    Imaging:  -MRI abdomen w/wo contrast (7/2) - Cystic lesion with a few thin septations in the pancreatic head and uncinate process measures 3.5 cm and is not significantly changed since 2022. 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 Due to its size, advise gastroenterology consultation for correlation with endoscopic ultrasound/FNA, followed by close surveillance with MRI alternating with EUS every 3-6 months. Small 4 mm probable side-branch IPMN in the pancreatic tail. This may also be assessed concurrently at the time of follow-up for the  dominant lesion in the pancreatic head.     Interventions:   - 1L LR, 1L NS, zofran  4mg, 2mg imodium, 20mg bentyl     Past Medical History: per HPI  Past Surgical History: none  Allergies: per EMR     Social History:   - Coming from home  - Tobacco: former smoker, ~20 years at 1/2 pack per day  - Alcohol: social drinker, last drink around January  - Illicit Drug: Denies usage     Code Status: DNR  Emergency Contact: Chetna Rowland (wife / POA) - [phone]     Past Medical History  He has a past medical history of Anemia (2019), Arthritis (2019), Callus (2023), GERD (gastroesophageal reflux disease) (2000), Headache (April 20, 2024), Hypertension (2000), Inflammatory bowel disease (2020), Osteoporosis, Personal history of other diseases of the digestive system, Personal history of other diseases of the musculoskeletal system and connective tissue, and Plantar fasciitis (2021).    Surgical History  He has a past surgical history that includes Other surgical history (10/07/2019); Septoplasty (2023); Sinus surgery (Jan 2024); and [place] tooth extraction (Jan 2024).     Social History  He reports that he quit smoking about 43 years ago. His smoking use included cigarettes. He started smoking about 49 years ago. He has a 11 pack-year smoking history. He has never used smokeless tobacco. He reports that he does not currently use alcohol after a past usage of about 10.0 standard drinks of alcohol per week. He reports that he does not use drugs.    Family History  Family History   Problem Relation Name Age of Onset    Arthritis Mother Daysi Rowland     Other (abestosis) Father      Cancer Father      No Known Problems Sister      No Known Problems Brother          Allergies  Amoxicillin-pot clavulanate, Erythromycin, Hydroxychloroquine, Levofloxacin, Nsaids (non-steroidal anti-inflammatory drug), and Sulfa (sulfonamide antibiotics)    Review of Systems   Constitutional:  Negative for chills and fever.   Gastrointestinal:  Positive for diarrhea, nausea and vomiting.   All other systems reviewed and are  negative.       Physical Exam  Vitals reviewed.   HENT:      Head: Normocephalic and atraumatic.   Cardiovascular:      Rate and Rhythm: Normal rate and regular rhythm.      Heart sounds: Normal heart sounds. No murmur heard.     No gallop.   Pulmonary:      Effort: Pulmonary effort is normal. No respiratory distress.      Breath sounds: Normal breath sounds.   Abdominal:      General: Abdomen is flat. There is no distension.      Palpations: Abdomen is soft.      Tenderness: There is no abdominal tenderness.   Musculoskeletal:      Right lower leg: No edema.      Left lower leg: No edema.   Skin:     General: Skin is warm and dry.   Neurological:      Mental Status: He is alert. Mental status is at baseline.   Psychiatric:         Mood and Affect: Mood normal.         Behavior: Behavior normal.          Last Recorded Vitals  /64   Pulse 97   Temp 36.4 °C (97.5 °F) (Temporal)   Resp 16   Wt 65.8 kg (145 lb)   SpO2 96%     Relevant Results  No current facility-administered medications for this encounter.    Current Outpatient Medications:     azaTHIOprine (Imuran) 50 mg tablet, Take 1 tablet (50 mg) by mouth 2 times a day., Disp: 60 tablet, Rfl: 5    cholecalciferol (Vitamin D-3) 125 MCG (5000 UT) capsule, Take 1 capsule (125 mcg) by mouth once daily., Disp: , Rfl:     colesevelam (WelChoL) 625 mg tablet, Take 2 tablets twice a day, Disp: 120 tablet, Rfl: 5    ibandronate (Boniva) 150 mg tablet, Take 1 tablet (150 mg) by mouth every 30 (thirty) days., Disp: 3 tablet, Rfl: 3    lisinopril 10 mg tablet, Take 1 tablet by mouth once daily, Disp: 90 tablet, Rfl: 1    pantoprazole (ProtoNix) 40 mg EC tablet, Take 1 tablet (40 mg) by mouth once daily in the morning. Take before meals. Do not crush, chew, or split., Disp: 30 tablet, Rfl: 0    Results for orders placed or performed during the hospital encounter of 07/03/24 (from the past 24 hour(s))   Magnesium   Result Value Ref Range    Magnesium 1.78 1.60 - 2.40  mg/dL   CBC and Auto Differential   Result Value Ref Range    WBC 7.6 4.4 - 11.3 x10*3/uL    nRBC 0.0 0.0 - 0.0 /100 WBCs    RBC 3.48 (L) 4.50 - 5.90 x10*6/uL    Hemoglobin 9.9 (L) 13.5 - 17.5 g/dL    Hematocrit 32.2 (L) 41.0 - 52.0 %    MCV 93 80 - 100 fL    MCH 28.4 26.0 - 34.0 pg    MCHC 30.7 (L) 32.0 - 36.0 g/dL    RDW 19.1 (H) 11.5 - 14.5 %    Platelets 174 150 - 450 x10*3/uL    Neutrophils % 76.6 40.0 - 80.0 %    Immature Granulocytes %, Automated 0.1 0.0 - 0.9 %    Lymphocytes % 8.1 13.0 - 44.0 %    Monocytes % 10.5 2.0 - 10.0 %    Eosinophils % 3.9 0.0 - 6.0 %    Basophils % 0.8 0.0 - 2.0 %    Neutrophils Absolute 5.85 (H) 1.60 - 5.50 x10*3/uL    Immature Granulocytes Absolute, Automated 0.01 0.00 - 0.50 x10*3/uL    Lymphocytes Absolute 0.62 (L) 0.80 - 3.00 x10*3/uL    Monocytes Absolute 0.80 0.05 - 0.80 x10*3/uL    Eosinophils Absolute 0.30 0.00 - 0.40 x10*3/uL    Basophils Absolute 0.06 0.00 - 0.10 x10*3/uL   Comprehensive metabolic panel   Result Value Ref Range    Glucose 136 (H) 74 - 99 mg/dL    Sodium 136 136 - 145 mmol/L    Potassium 4.6 3.5 - 5.3 mmol/L    Chloride 100 98 - 107 mmol/L    Bicarbonate 20 (L) 21 - 32 mmol/L    Anion Gap 21 (H) 10 - 20 mmol/L    Urea Nitrogen 23 6 - 23 mg/dL    Creatinine 1.52 (H) 0.50 - 1.30 mg/dL    eGFR 46 (L) >60 mL/min/1.73m*2    Calcium 9.8 8.6 - 10.3 mg/dL    Albumin 4.3 3.4 - 5.0 g/dL    Alkaline Phosphatase 114 33 - 136 U/L    Total Protein 7.4 6.4 - 8.2 g/dL    AST 67 (H) 9 - 39 U/L    Bilirubin, Total 0.5 0.0 - 1.2 mg/dL    ALT 55 (H) 10 - 52 U/L   Lipase   Result Value Ref Range    Lipase 94 (H) 9 - 82 U/L   Lactate   Result Value Ref Range    Lactate 3.4 (H) 0.4 - 2.0 mmol/L   Urinalysis with Reflex Culture and Microscopic   Result Value Ref Range    Color, Urine Yellow Straw, Yellow    Appearance, Urine Clear Clear    Specific Gravity, Urine >1.030 (N) 1.005 - 1.035    pH, Urine 5.5 5.0, 5.5, 6.0, 6.5, 7.0, 7.5, 8.0    Protein, Urine 30 (1+) (A) NEGATIVE,  10 (TRACE) mg/dL    Glucose, Urine NEGATIVE NEGATIVE mg/dL    Blood, Urine NEGATIVE NEGATIVE    Ketones, Urine NEGATIVE NEGATIVE mg/dL    Bilirubin, Urine NEGATIVE NEGATIVE    Urobilinogen, Urine NORM NORM mg/dL    Nitrite, Urine NEGATIVE NEGATIVE    Leukocyte Esterase, Urine NEGATIVE NEGATIVE   Urinalysis Microscopic   Result Value Ref Range    WBC, Urine NONE 1-5, NONE /HPF    RBC, Urine NONE NONE, 1-2, 3-5 /HPF    Hyaline Casts, Urine 1+ (A) NONE /LPF   Lactate   Result Value Ref Range    Lactate 4.0 (HH) 0.4 - 2.0 mmol/L     MR abdomen w and wo IV contrast    Result Date: 7/3/2024  Interpreted By:  Angelina King, STUDY: MR ABDOMEN W AND WO IV CONTRAST;  7/2/2024 11:29 am   INDICATION: Signs/Symptoms:cystic head lesion of pancreas.   COMPARISON: CT chest abdomen and pelvis 10/22/2022 and 05/28/2024.   ACCESSION NUMBER(S): [id number]   ORDERING CLINICIAN: EAMON GRAY   TECHNIQUE: MRI PANCREAS; Multiplanar magnetic resonance images of the abdomen were obtained including the following sequences; T2-weighted SSFSE with and without fat saturation, T1-weighted GRE in/opposed phase, DWI, fat saturated 3D-T1w GRE pre and dynamically post contrast. Radial thick slab T2w RARE MRCP and coronally reconstructed navigator gated high resolution 3-D T2w RESTORE MRCP with MIP reconstruction were also performed for MRCP.  14 mL of Dotarem was administered intravenously without immediate complication.   FINDINGS: LIVER: Liver is normal in size, contour and signal intensity. Subcentimeter simple hepatic cyst noted in the left liver lobe within segment 2 and 3. No solid liver mass. Subcentimeter focus of susceptibility artifact without enhancement in the dome of the right liver lobe represents a calcification better seen on prior CT.   BILE DUCTS: No intrahepatic or extrahepatic bile duct dilatation is demonstrated.   GALLBLADDER: Within normal limits.   PANCREAS: There is moderate fatty replacement of the pancreatic head and  mild fatty replacement of the pancreatic body and tail. Slight luminal irregularity is noted in the pancreatic body and tail on 3D MRCP sequences, which may be due to artifact or sequela of prior pancreatitis.   In the pancreatic head and uncinate process, there is a T2 hyperintense nonenhancing cystic lesion with a few thin internal septations, which likely communicates with the pancreatic duct on 3D MRCP sequences. This measures 3.5 x 2.8 x 1.5 cm (cc by transverse by AP) and is not significantly changed since October 2022 when accounting for differences in technique. No thick enhancing septations or enhancing nodular components. No associated main pancreatic ductal dilation or solid mass. A tiny 4 mm cystic lesion is also noted in the pancreatic tail (series 12, image 23) without complex features. Both of these lesions likely represent side branch IPMNs.   SPLEEN: Within normal limits.   ADRENAL GLANDS: Within normal limits.   KIDNEYS: There is a small focal area of renal cortical scarring in the lateral aspect of the left mid kidney with adjacent caliectasis. Subcentimeter simple renal cyst in the left upper pole. Subcentimeter T1 hyperintense hemorrhagic/proteinaceous renal cyst in the anterior right mid kidney. No hydronephrosis.   LYMPH NODES: No lymphadenopathy.   ABDOMINAL VESSELS: Aorta and the major abdominal arterial vessels demonstrate no gross abnormality.   BOWEL: Within normal limits.   PERITONEUM/RETROPERITONEUM: No ascites.   BONES AND LOWER THORAX: Stable L3 vertebral body hemangioma noted. No aggressive bone lesion. Multilevel lumbar spine degenerative disc disease. Lower thoracic structures are unremarkable.       1.  Cystic lesion with a few thin septations in the pancreatic head and uncinate process measures 3.5 cm and is not significantly changed since 2022. 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 Due to its size, advise gastroenterology consultation for correlation with  endoscopic ultrasound/FNA, followed by close surveillance with MRI alternating with EUS every 3-6 months. 2. Small 4 mm probable side-branch IPMN in the pancreatic tail. This may also be assessed concurrently at the time of follow-up for the dominant lesion in the pancreatic head. 3. Additional benign incidental findings, as detailed above.     MACRO: None   Signed by: Angelina King 7/3/2024 12:23 PM Dictation workstation:   [id number]        Assessment/Plan   Oren Rowland is a 79 y.o. male with a past medical history of HTN, vitamin D deficiency, GERD, osteoporosis, and rheumatoid arthritis presents to [facility] ED with a chief complaint of nausea, vomiting, and diarrhea.    Acute medical issues:  Chronic diarrhea  Lactic acidosis  Tachycardia  -s/p 2L IVF in ED  -MRI abdomen w/wo contrast (7/2) - Cystic lesion with a few thin septations in the pancreatic head and uncinate process measures 3.5 cm and is not significantly changed since 2022. 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 Due to its size, advise gastroenterology consultation for correlation with endoscopic ultrasound/FNA, followed by close surveillance with MRI alternating with EUS every 3-6 months. Small 4 mm probable side-branch IPMN in the pancreatic tail. This may also be assessed concurrently at the time of follow-up for the  dominant lesion in the pancreatic head.  -suspect that diarrhea may be due to pancreatic cystic head lesion - needs to schedule follow up with Dr. Hwang (GI)  -trend lactate  -IVF PRN  -tele ordered  -C diff PCR, stool pathogen PCR pending  -cont colesevelam 625mg daily  -cont protonix 40mg daily  -zofran, bentyl PRN    NARAYAN 2/2 dehydration  -likely prerenal  -monitor    Unintentional weight loss  -lost about 20-25lbs since June 1st due to poor appetite  -consider nutrition consult    Elevated LFTs  -monitor     Chronic medical issues:  HTN - cont lisinopril 10mg daily  Rheumatoid arthritis - cont azathioprine  50mg BID  Osteoporosis - on ibandronate 150mg q30 days  Vitamin D deficiency - cont vit d supplement    Fluids: s/p 2L IVF  Electrolytes: Will replace as needed   Nutrition: Reg diet  GI Ppx: Protonix  DVT Ppx: Lovenox  Code Status: DNR  Abx: None  Lines: 1x PIV  Emergency Contact: Chetna Rowland (wife / POA) - [phone]  Family contacted: Updated via phone on 7/3 evening    Disposition: Admitted for chronic diarrhea / elevated lactate. Trend lactate. Stool pathogen PCR / C Diff pending. IVF PRN. Estimated length of stay <2 nights.     Patient staffed with Dr. Reed who is in agreement with the plan.     Bradley Sumner, DO  Internal Medicine, PGY-II

## 2024-07-04 NOTE — PROGRESS NOTES
Oren Rowland is a 79 y.o. male on day 0 of admission presenting with Diarrhea, unspecified.      Subjective   Continues to have diarrhea but tolerated some of his diet this morning.  He is hoping to dc today.  Wife coming in this morning.  He denies CP, SOB.       Objective     Last Recorded Vitals  /70 (BP Location: Left arm, Patient Position: Lying)   Pulse 95   Temp 36.5 °C (97.7 °F) (Temporal)   Resp 18   Wt 65.8 kg (145 lb)   SpO2 95%   Intake/Output last 3 Shifts:    Intake/Output Summary (Last 24 hours) at 7/4/2024 1011  Last data filed at 7/4/2024 0410  Gross per 24 hour   Intake 2288.33 ml   Output --   Net 2288.33 ml       Admission Weight  Weight: 65.8 kg (145 lb) (07/03/24 1736)    Daily Weight  07/03/24 : 65.8 kg (145 lb)    Image Results  MR abdomen w and wo IV contrast  Narrative: Interpreted By:  Angelina King,   STUDY:  MR ABDOMEN W AND WO IV CONTRAST;  7/2/2024 11:29 am      INDICATION:  Signs/Symptoms:cystic head lesion of pancreas.      COMPARISON:  CT chest abdomen and pelvis 10/22/2022 and 05/28/2024.      ACCESSION NUMBER(S):  [id number]      ORDERING CLINICIAN:  EAMON GRAY      TECHNIQUE:  MRI PANCREAS; Multiplanar magnetic resonance images of the abdomen  were obtained including the following sequences; T2-weighted SSFSE  with and without fat saturation, T1-weighted GRE in/opposed phase,  DWI, fat saturated 3D-T1w GRE pre and dynamically post contrast.  Radial thick slab T2w RARE MRCP and coronally reconstructed navigator  gated high resolution 3-D T2w RESTORE MRCP with MIP reconstruction  were also performed for MRCP.  14 mL of Dotarem was administered  intravenously without immediate complication.      FINDINGS:  LIVER:  Liver is normal in size, contour and signal intensity. Subcentimeter  simple hepatic cyst noted in the left liver lobe within segment 2 and  3. No solid liver mass. Subcentimeter focus of susceptibility  artifact without enhancement in the dome of the  right liver lobe  represents a calcification better seen on prior CT.      BILE DUCTS:  No intrahepatic or extrahepatic bile duct dilatation is demonstrated.      GALLBLADDER:  Within normal limits.      PANCREAS:  There is moderate fatty replacement of the pancreatic head and mild  fatty replacement of the pancreatic body and tail. Slight luminal  irregularity is noted in the pancreatic body and tail on 3D MRCP  sequences, which may be due to artifact or sequela of prior  pancreatitis.      In the pancreatic head and uncinate process, there is a T2  hyperintense nonenhancing cystic lesion with a few thin internal  septations, which likely communicates with the pancreatic duct on 3D  MRCP sequences. This measures 3.5 x 2.8 x 1.5 cm (cc by transverse by  AP) and is not significantly changed since October 2022 when  accounting for differences in technique. No thick enhancing  septations or enhancing nodular components. No associated main  pancreatic ductal dilation or solid mass. A tiny 4 mm cystic lesion  is also noted in the pancreatic tail (series 12, image 23) without  complex features. Both of these lesions likely represent side branch  IPMNs.      SPLEEN:  Within normal limits.      ADRENAL GLANDS:  Within normal limits.      KIDNEYS:  There is a small focal area of renal cortical scarring in the lateral  aspect of the left mid kidney with adjacent caliectasis.  Subcentimeter simple renal cyst in the left upper pole. Subcentimeter  T1 hyperintense hemorrhagic/proteinaceous renal cyst in the anterior  right mid kidney. No hydronephrosis.      LYMPH NODES:  No lymphadenopathy.      ABDOMINAL VESSELS:  Aorta and the major abdominal arterial vessels demonstrate no gross  abnormality.      BOWEL:  Within normal limits.      PERITONEUM/RETROPERITONEUM:  No ascites.      BONES AND LOWER THORAX:  Stable L3 vertebral body hemangioma noted. No aggressive bone lesion.  Multilevel lumbar spine degenerative disc disease.  Lower thoracic  structures are unremarkable.      Impression: 1.  Cystic lesion with a few thin septations in the pancreatic head  and uncinate process measures 3.5 cm and is not significantly changed  since 2022. This is favored to represent a pancreatic side branch  IPMN. Due to its size, advise gastroenterology consultation for  correlation with endoscopic ultrasound/FNA, followed by close  surveillance with MRI alternating with EUS every 3-6 months.  2. Small 4 mm probable side-branch IPMN in the pancreatic tail. This  may also be assessed concurrently at the time of follow-up for the  dominant lesion in the pancreatic head.  3. Additional benign incidental findings, as detailed above.          MACRO:  None      Signed by: Angelina King 7/3/2024 12:23 PM  Dictation workstation:   [id number]      Physical Exam  Physical Exam  Gen: NAD  Eyes:  EOM intact  ENT: MMM  Neck: No JVD  Respiratory: CTAB, no wheezes/rhonchi  Cardiac: RRR, no murmurs rubs or gallops  Abdomen: soft, NT, +BS  Extremities: no edema or cyanosis  Neuro: No focal deficits, alert and oriented x 3, cognitive impairment  Psych:  appropriate mood and behavior      Assessment/Plan      Principal Problem:    Diarrhea, unspecified  -in the setting of pancreatic intraductal papillary mucinous neoplasm  -he states he has appointment with GI next week  -continues to have diarrhea but vomiting has improved  -stool studies sent 7/3 at 1920  -continue precautions  -continue IVF  -colesevelam  -protonix  -zofran, bentyl PRN    NARAYAN 2/2 dehydration  -improved but not back to baseline  -continue mIVF  -recheck labs in AM    Unintentional weight loss  -reported 20-25 lbs in past month  -RD consult    Elevated LFTs  -improved  -needs further work up with GI    HTN  -will hold lisinopril 2/2 NARAYAN  -BP well controlled    RA  -azathioprine    DVT prophy  -lovenox    Dispo: continue mIVF, monitor labs, stool studies results pending, he will not discharge today  D/w  Dr. Emma Watts PAAndresC

## 2024-07-04 NOTE — CARE PLAN
The patient's goals for the shift include  to have adequate sleep through shift  Problem: Pain - Adult  Goal: Verbalizes/displays adequate comfort level or baseline comfort level  Outcome: Progressing     Problem: Safety - Adult  Goal: Free from fall injury  Outcome: Progressing     Problem: Discharge Planning  Goal: Discharge to home or other facility with appropriate resources  Outcome: Progressing     Problem: Chronic Conditions and Co-morbidities  Goal: Patient's chronic conditions and co-morbidity symptoms are monitored and maintained or improved  Outcome: Progressing       The clinical goals for the shift include Free of diarrhea

## 2024-07-04 NOTE — ED PROVIDER NOTES
HPI   Chief Complaint   Patient presents with    Diarrhea       This is a 79-year-old male with PMH arthritis, GERD, HTN, diarrhea presenting for evaluation of nonbloody diarrhea nonbloody emesis to many episodes to count today.  Has not been able to tolerate any p.o.  Had MRI of the abdomen yesterday showing pancreatic cyst.  He denies fevers, chills, shortness of breath, chest pain, abdominal pain, melena, hematochezia.      History provided by:  Patient   used: No                        Katie Coma Scale Score: 15                     Patient History   Past Medical History:   Diagnosis Date    Anemia 2019    Arthritis 2019    Callus     GERD (gastroesophageal reflux disease)     Headache 2024    Hypertension     Inflammatory bowel disease     Osteoporosis     Personal history of other diseases of the digestive system     History of gastroesophageal reflux (GERD)    Personal history of other diseases of the musculoskeletal system and connective tissue     History of arthritis    Plantar fasciitis      Past Surgical History:   Procedure Laterality Date    OTHER SURGICAL HISTORY  10/07/2019    Trigger finger repair    SEPTOPLASTY      elizabeth    SINUS SURGERY  2024    WISDOM TOOTH EXTRACTION  2024     Family History   Problem Relation Name Age of Onset    Arthritis Mother Daysi Rowland     Other (abestosis) Father      Cancer Father      No Known Problems Sister      No Known Problems Brother       Social History     Tobacco Use    Smoking status: Former     Current packs/day: 0.00     Average packs/day: 1 pack/day for 11.0 years (11.0 ttl pk-yrs)     Types: Cigarettes     Start date: 1975     Quit date: 1981     Years since quittin.5    Smokeless tobacco: Never   Vaping Use    Vaping status: Never Used   Substance Use Topics    Alcohol use: Not Currently     Alcohol/week: 10.0 standard drinks of alcohol     Types: 10 Shots of liquor per week     Drug use: Never       Physical Exam   ED Triage Vitals [07/03/24 1736]   Temperature Heart Rate Respirations BP   36.4 °C (97.5 °F) (!) 124 20 88/61      Pulse Ox Temp Source Heart Rate Source Patient Position   98 % Temporal Monitor --      BP Location FiO2 (%)     -- --       Physical Exam    General: Vitals noted.  Hypotensive  EENT: Sclerae anicteric.  Mucous membranes dry.  Cardiac: Tachycardic rate regular rhythm. No murmur  Pulmonary: Lungs clear bilaterally with good aeration  Abdomen: Soft. Nontender. No rebound. No guarding  : No CVA tenderness. exam deferred  Extremities: JAQUEZ normally  Skin: No rash on abdomen  Neuro: Alert and oriented    ED Course & MDM   Diagnoses as of 07/03/24 2146   Diarrhea, unspecified type   Nausea and vomiting, unspecified vomiting type       Medical Decision Making  DDx: Electrolyte derangement, dehydration, C. difficile, infectious diarrhea, gastroenteritis    Patient has no focal abdominal tenderness.  Initially presents hypotensive and tachycardic which is suspected secondary to dehydration/hypovolemia.  Was initially given a liter of LR.  Lactic was 3.4.  No leukocytosis.  Hemoglobin 9.9 stated at the baseline.  Creatinine bumped 1.52 which suspect is secondary to dehydration.  4.0 lactate on reeval so was given additional liter of normal saline.  Was given Zofran.  He did continue to have diarrhea here sample was collected for C. difficile and infectious PCR.  His heart rate is improved.  Blood pressure improved after fluids.  Plan to hospitalize for further symptom management.  Given no focal abdominal tenderness no rebound or guarding do not feel repeat imaging was indicated at this time.  Discussed with the hospitalist team who accepted.  This visit was staffed with the attending physician Dr. Marte.      Disclaimer: This note was dictated using speech recognition software. An attempt at proofreading was made to minimize errors. Minor errors in transcription may  be present. Please call if questions.    Amount and/or Complexity of Data Reviewed  Labs: ordered.  Radiology: ordered.        Procedure  Procedures     Estevan Swift PA-C  07/03/24 0856

## 2024-07-05 LAB
ALBUMIN SERPL BCP-MCNC: 3.5 G/DL (ref 3.4–5)
ALP SERPL-CCNC: 99 U/L (ref 33–136)
ALT SERPL W P-5'-P-CCNC: 36 U/L (ref 10–52)
ANION GAP SERPL CALC-SCNC: 13 MMOL/L (ref 10–20)
AST SERPL W P-5'-P-CCNC: 40 U/L (ref 9–39)
BILIRUB DIRECT SERPL-MCNC: 0.2 MG/DL (ref 0–0.3)
BILIRUB SERPL-MCNC: 0.6 MG/DL (ref 0–1.2)
BUN SERPL-MCNC: 21 MG/DL (ref 6–23)
C COLI+JEJ+UPSA DNA STL QL NAA+PROBE: NOT DETECTED
CALCIUM SERPL-MCNC: 8 MG/DL (ref 8.6–10.3)
CHLORIDE SERPL-SCNC: 108 MMOL/L (ref 98–107)
CO2 SERPL-SCNC: 18 MMOL/L (ref 21–32)
CREAT SERPL-MCNC: 1.09 MG/DL (ref 0.5–1.3)
EC STX1 GENE STL QL NAA+PROBE: NOT DETECTED
EC STX2 GENE STL QL NAA+PROBE: NOT DETECTED
EGFRCR SERPLBLD CKD-EPI 2021: 69 ML/MIN/1.73M*2
ERYTHROCYTE [DISTWIDTH] IN BLOOD BY AUTOMATED COUNT: 18.7 % (ref 11.5–14.5)
GLUCOSE SERPL-MCNC: 92 MG/DL (ref 74–99)
HCT VFR BLD AUTO: 28.4 % (ref 41–52)
HGB BLD-MCNC: 8.7 G/DL (ref 13.5–17.5)
HOLD SPECIMEN: NORMAL
MAGNESIUM SERPL-MCNC: 1.74 MG/DL (ref 1.6–2.4)
MCH RBC QN AUTO: 28.4 PG (ref 26–34)
MCHC RBC AUTO-ENTMCNC: 30.6 G/DL (ref 32–36)
MCV RBC AUTO: 93 FL (ref 80–100)
NOROVIRUS GI + GII RNA STL NAA+PROBE: NOT DETECTED
NRBC BLD-RTO: 0 /100 WBCS (ref 0–0)
PHOSPHATE SERPL-MCNC: 2.7 MG/DL (ref 2.5–4.9)
PLATELET # BLD AUTO: 130 X10*3/UL (ref 150–450)
POTASSIUM SERPL-SCNC: 3.7 MMOL/L (ref 3.5–5.3)
PROT SERPL-MCNC: 6.3 G/DL (ref 6.4–8.2)
RBC # BLD AUTO: 3.06 X10*6/UL (ref 4.5–5.9)
RV RNA STL NAA+PROBE: NOT DETECTED
SALMONELLA DNA STL QL NAA+PROBE: NOT DETECTED
SHIGELLA DNA SPEC QL NAA+PROBE: NOT DETECTED
SODIUM SERPL-SCNC: 135 MMOL/L (ref 136–145)
V CHOLERAE DNA STL QL NAA+PROBE: NOT DETECTED
WBC # BLD AUTO: 4.2 X10*3/UL (ref 4.4–11.3)
Y ENTEROCOL DNA STL QL NAA+PROBE: NOT DETECTED

## 2024-07-05 PROCEDURE — 83735 ASSAY OF MAGNESIUM: CPT

## 2024-07-05 PROCEDURE — 2500000001 HC RX 250 WO HCPCS SELF ADMINISTERED DRUGS (ALT 637 FOR MEDICARE OP): Performed by: INTERNAL MEDICINE

## 2024-07-05 PROCEDURE — 94760 N-INVAS EAR/PLS OXIMETRY 1: CPT

## 2024-07-05 PROCEDURE — 82374 ASSAY BLOOD CARBON DIOXIDE: CPT

## 2024-07-05 PROCEDURE — 96372 THER/PROPH/DIAG INJ SC/IM: CPT

## 2024-07-05 PROCEDURE — 36415 COLL VENOUS BLD VENIPUNCTURE: CPT

## 2024-07-05 PROCEDURE — 2500000004 HC RX 250 GENERAL PHARMACY W/ HCPCS (ALT 636 FOR OP/ED)

## 2024-07-05 PROCEDURE — 2500000001 HC RX 250 WO HCPCS SELF ADMINISTERED DRUGS (ALT 637 FOR MEDICARE OP)

## 2024-07-05 PROCEDURE — 85027 COMPLETE CBC AUTOMATED: CPT

## 2024-07-05 PROCEDURE — 2500000002 HC RX 250 W HCPCS SELF ADMINISTERED DRUGS (ALT 637 FOR MEDICARE OP, ALT 636 FOR OP/ED): Performed by: INTERNAL MEDICINE

## 2024-07-05 PROCEDURE — 99232 SBSQ HOSP IP/OBS MODERATE 35: CPT | Performed by: INTERNAL MEDICINE

## 2024-07-05 PROCEDURE — G0378 HOSPITAL OBSERVATION PER HR: HCPCS

## 2024-07-05 PROCEDURE — 84100 ASSAY OF PHOSPHORUS: CPT

## 2024-07-05 PROCEDURE — 82248 BILIRUBIN DIRECT: CPT

## 2024-07-05 RX ORDER — LOPERAMIDE HYDROCHLORIDE 2 MG/1
2 CAPSULE ORAL 4 TIMES DAILY PRN
Status: DISCONTINUED | OUTPATIENT
Start: 2024-07-05 | End: 2024-07-06

## 2024-07-05 RX ORDER — LISINOPRIL 10 MG/1
10 TABLET ORAL DAILY
Start: 2024-07-08

## 2024-07-05 RX ORDER — OLANZAPINE 5 MG/1
2.5 TABLET, ORALLY DISINTEGRATING ORAL EVERY 6 HOURS PRN
Status: DISCONTINUED | OUTPATIENT
Start: 2024-07-05 | End: 2024-07-06 | Stop reason: HOSPADM

## 2024-07-05 RX ORDER — OLANZAPINE 5 MG/1
5 TABLET, ORALLY DISINTEGRATING ORAL NIGHTLY
Status: DISCONTINUED | OUTPATIENT
Start: 2024-07-05 | End: 2024-07-06 | Stop reason: HOSPADM

## 2024-07-05 ASSESSMENT — COGNITIVE AND FUNCTIONAL STATUS - GENERAL
DAILY ACTIVITIY SCORE: 24
DAILY ACTIVITIY SCORE: 24
MOBILITY SCORE: 24
MOBILITY SCORE: 24

## 2024-07-05 ASSESSMENT — ACTIVITIES OF DAILY LIVING (ADL): LACK_OF_TRANSPORTATION: NO

## 2024-07-05 ASSESSMENT — PAIN SCALES - GENERAL
PAINLEVEL_OUTOF10: 0 - NO PAIN
PAINLEVEL_OUTOF10: 0 - NO PAIN

## 2024-07-05 ASSESSMENT — PAIN - FUNCTIONAL ASSESSMENT: PAIN_FUNCTIONAL_ASSESSMENT: 0-10

## 2024-07-05 NOTE — CONSULTS
"    Nutrition Assessment     Reason for Assessment: Provider consult order    79 y.o. male adm with Diarrhea, unspecified    Food and Nutrient History: Pt resting at time of visit, no visitors present, per nursing pt getting his stool checked, awaiting results, pt poor historian at times. Has been having diarrhea since wednesday(7/3) Has been trying to eat but able to eat very little as he does not feel well. pt with recent admission per RD note(5/29/24) \"Pt reports decreased oral  intakes over past 7 days- this AM per pt he was able to tolerate fruit, juice, and coffee. Pt reports diarrhea that occurs intermittently- no foods aggravate this in particular per pt\"                   Difficulty chewing/swallowing: none noted       Per Flowsheet Percent Meal intake:     Dietary Orders (From admission, onward)       Start     Ordered    07/03/24 2241  Adult diet Regular  Diet effective now        Question:  Diet type  Answer:  Regular    07/03/24 2240                  Feeding assistance level:    Scheduled medications:  cholecalciferol, 2,000 Units, oral, Daily  colesevelam, 625 mg, oral, Daily  enoxaparin, 40 mg, subcutaneous, q24h  [Held by provider] lisinopril, 10 mg, oral, Daily  pantoprazole, 40 mg, oral, Daily before breakfast      Continuous medications:     PRN medications:  PRN medications: dicyclomine, loperamide, melatonin, ondansetron ODT **OR** ondansetron   Results from last 7 days   Lab Units 07/05/24  0622 07/04/24  0620 07/03/24  1742   GLUCOSE mg/dL 92 83 136*   SODIUM mmol/L 135* 137 136   POTASSIUM mmol/L 3.7 3.9 4.6   CHLORIDE mmol/L 108* 106 100   CO2 mmol/L 18* 22 20*   BUN mg/dL 21 24* 23   CREATININE mg/dL 1.09 1.43* 1.52*   EGFR mL/min/1.73m*2 69 50* 46*   CALCIUM mg/dL 8.0* 7.9* 9.8   PHOSPHORUS mg/dL 2.7 3.7  --    MAGNESIUM mg/dL 1.74 1.81 1.78     Lab Results   Component Value Date    HGBA1C 5.4 02/28/2024    HGBA1C 5.5 06/23/2023    HGBA1C 4.9 06/07/2023           Per " "Flowsheet:  Gastrointestinal  Gastrointestinal (WDL): Within Defined Limits  Last BM Date: 07/04/24  Bowel Incontinence: Yes  Stool Appearance: Mucous, Watery  Gastrointestinal Symptoms: Diarrhea  Last bowel movement documented: 07/04/24  Past Medical History:   Diagnosis Date    Anemia 2019    Arthritis 2019    Callus 2023    GERD (gastroesophageal reflux disease) 2000    Headache April 20, 2024    Hypertension 2000    Inflammatory bowel disease 2020    Osteoporosis     Personal history of other diseases of the digestive system     History of gastroesophageal reflux (GERD)    Personal history of other diseases of the musculoskeletal system and connective tissue     History of arthritis    Plantar fasciitis 2021      Past Surgical History:   Procedure Laterality Date    OTHER SURGICAL HISTORY  10/07/2019    Trigger finger repair    SEPTOPLASTY  2023    elizabeth    SINUS SURGERY  Jan 2024    WISDOM TOOTH EXTRACTION  Jan 2024      Allergies: Amoxicillin-pot clavulanate, Erythromycin, Hydroxychloroquine, Levofloxacin, Nsaids (non-steroidal anti-inflammatory drug), and Sulfa (sulfonamide antibiotics)     Anthropometrics:  Height: 172.7 cm (5' 8\")  Weight: 65.8 kg (145 lb)  BMI (Calculated): 22.05      Daily Weight  07/03/24 : 65.8 kg (145 lb)  07/02/24 : 65.8 kg (145 lb)  06/05/24 : 72.6 kg (160 lb)  05/28/24 : 72.6 kg (160 lb)  05/28/24 : 72.6 kg (160 lb)  04/24/24 : 74.8 kg (165 lb)  04/22/24 : 74.4 kg (164 lb)  02/28/24 : 75.3 kg (166 lb)  01/17/24 : 75.8 kg (167 lb)  12/04/23 : 75.8 kg (167 lb)    Weight         7/3/2024  1736             Weight: 65.8 kg (145 lb)           Weight History / % Weight Change: 9.4%weight loss [id number], nursing will obtain new weight for accuracy  Significant Weight Loss: Yes  Interpretation of Weight Loss: >5% in 1 month    Skin: none noted (please see nursing/wound notes for further details)  Edema: none noted  0-10 (Numeric) Pain Score: 0 - No pain    Estimated Nutritional Needs:  Energy " Needs: 1645-1974kcal (25-30kcal/kg CBW)  Protein Needs: 66g (1g/kg CBW)  Fluid Needs: (1mL/kcal) or per MD                   Nutrition Focused Physical Findings: defer at this time                       Nutrition Diagnosis        Patient has Nutrition Diagnosis: Yes  New  Nutrition Diagnosis 1: Inadequate oral intake  Related to (1): diarrhea, poor appetite  As Evidenced by (1): limited PO since admission                           Nutrition Interventions/Recommendations   Intervention:  Coordination of Care: Jyoti FITZGERALD         Individualized Nutrition Prescription Provided for : May offer Ensure Plus HP again 1/day (350kcal, 20g protein), diet as tolerated, encourage PO as able    Education: Available if needs arise.    Goals: oral intake >50% and consume prescribed supplements       Nutrition Monitoring and Evaluation   Weights  Labs  PO intake  Supplement intake  Stools              Time Spent (min): 60 minutes

## 2024-07-05 NOTE — PROGRESS NOTES
07/05/24 0932   Discharge Planning   Living Arrangements Spouse/significant other   Support Systems Spouse/significant other   Assistance Needed Alert and oriented x 3, Independent with ADL's, Drives, No DME used at home.   Type of Residence Private residence   Number of Stairs to Enter Residence 0   Number of Stairs Within Residence 0   Do you have animals or pets at home? No   Who is requesting discharge planning? Provider   Home or Post Acute Services None   Patient expects to be discharged to: Patient denies any home going needs at this time   Financial Resource Strain   How hard is it for you to pay for the very basics like food, housing, medical care, and heating? Not hard   Housing Stability   In the last 12 months, was there a time when you were not able to pay the mortgage or rent on time? N   In the last 12 months, how many places have you lived? 1   In the last 12 months, was there a time when you did not have a steady place to sleep or slept in a shelter (including now)? N   Transportation Needs   In the past 12 months, has lack of transportation kept you from medical appointments or from getting medications? no   In the past 12 months, has lack of transportation kept you from meetings, work, or from getting things needed for daily living? No        07/05/24 1300   Discharge Planning   Patient expects to be discharged to: Patient is aware of likely dc today and is fine with discharge. Will arrange transport with his wife once dc completed.      07/05/24 1500   Discharge Planning   Patient expects to be discharged to: Discharge has been cancelled for today pending stool pathogen. Patient is aware of NO dc today

## 2024-07-05 NOTE — PROGRESS NOTES
[facility] Hospitalist Progress Note       [id number]: Please page me for patient care issues.  [phone]: Please page night hospitalist for any issues.     Bret Rowland  :  1945(79 y.o.)  MRN:  [id number]  PCP: Justin Gallo MD      Principal Problem:    Diarrhea, unspecified      Assessment and Plan:     Oren Rowland is a 79 y.o. 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 Patient  presents to [facility] ED with a chief complaint of 1 day of intractable nausea, vomiting, and watery diarrhea.     #Intractable N/V/Diarreha (resolved N/V, improving diarrhea)  #NARAYAN  #Transaminitis, mild  #RA  #Dementia (mild), type unknown, ongoing work up outpt  #pancreatic cystic (IPMN) ongoing work up outpt  -PRN IVF  -hold home lisinopril  -imodium pending final stool studies  -pt has GI appt on 24 outpt    Disposition:anticipate discharge 24    DVT Prophylaxis: Subq Lovenox    Code status: DNR  Diet: Adult diet Regular    Level of MDM:  Moderate    patient and family updated, I personally examined the patient, and I personally reviewed chart, data, labs radiology reports    Family Communication  Number :   Name of Designated Family Representative:       Total time spent: 35 minutes, of total time providing counseling or in coordination of care. Total time on this day of visit includes record and documentation review before and after visit including documentation and time not explicitly included on EMR time stamp      Subjective:   Interval History:  HPI  The patient complains of N/V/D  The patient feels their symptoms areimproving  no events or new concerns    Scheduled Meds:azaTHIOprine, 50 mg, oral, BID  cholecalciferol, 2,000 Units, oral, Daily  colesevelam, 625 mg, oral, Daily  enoxaparin, 40 mg, subcutaneous, q24h  [Held by  provider] lisinopril, 10 mg, oral, Daily  pantoprazole, 40 mg, oral, Daily before breakfast      Continuous Infusions:   PRN Meds:PRN medications: dicyclomine, loperamide, melatonin, ondansetron ODT **OR** ondansetron    Review of Systems   All other systems reviewed and are negative.    Interval Pertinent History:  Social History     Tobacco Use    Smoking status: Former     Current packs/day: 0.00     Average packs/day: 1 pack/day for 11.0 years (11.0 ttl pk-yrs)     Types: Cigarettes     Start date: 1975     Quit date: 1981     Years since quittin.5    Smokeless tobacco: Never   Substance Use Topics    Alcohol use: Not Currently     Alcohol/week: 10.0 standard drinks of alcohol     Types: 10 Shots of liquor per week         Objective:   Patient Vitals for the past 24 hrs:   BP Temp Pulse Resp SpO2   24 0924 -- -- -- -- 96 %   24 0530 142/64 36.8 °C (98.2 °F) 94 16 97 %   24 135/65 36.9 °C (98.4 °F) 104 16 93 %       Average, Min, and Max for last 24 hours Vitals:  TEMPERATURE:  Temp  Av.8 °C (98.3 °F)  Min: 36.8 °C (98.2 °F)  Max: 36.9 °C (98.4 °F)    RESPIRATIONS RANGE: Resp  Av  Min: 16  Max: 16    PULSE RANGE: Pulse  Av  Min: 94  Max: 104    BLOOD PRESSURE RANGE:  Systolic (24hrs), Av , Min:135 , Max:142   ; Diastolic (24hrs), Av, Min:64, Max:65      PULSE OXIMETRY RANGE: SpO2  Av.3 %  Min: 93 %  Max: 97 %  Body mass index is 22.05 kg/m².    I/O last 3 completed shifts:  In: 4136.3 (62.9 mL/kg) [P.O.:240; I.V.:896.3 (13.6 mL/kg); IV Piggyback:3000]  Out: - (0 mL/kg)   Weight: 65.8 kg   Weight change:      Physical Exam  Vitals and nursing note reviewed.   Constitutional:       General: He is not in acute distress.     Appearance: Normal appearance. He is not ill-appearing.   HENT:      Head: Normocephalic and atraumatic.      Right Ear: External ear normal.      Left Ear: External ear normal.      Nose: Nose normal. No congestion or rhinorrhea.       Mouth/Throat:      Mouth: Mucous membranes are moist.   Eyes:      Extraocular Movements: Extraocular movements intact.      Pupils: Pupils are equal, round, and reactive to light.   Cardiovascular:      Rate and Rhythm: Normal rate and regular rhythm.      Pulses: Normal pulses.      Heart sounds: Normal heart sounds.   Pulmonary:      Effort: Pulmonary effort is normal.      Breath sounds: Normal breath sounds.   Abdominal:      General: Abdomen is flat. Bowel sounds are normal.      Palpations: Abdomen is soft.   Musculoskeletal:         General: Normal range of motion.      Cervical back: Normal range of motion and neck supple.      Right lower leg: No edema.      Left lower leg: No edema.   Skin:     General: Skin is warm and dry.      Capillary Refill: Capillary refill takes less than 2 seconds.   Neurological:      General: No focal deficit present.      Mental Status: He is alert and oriented to person, place, and time. Mental status is at baseline.   Psychiatric:         Attention and Perception: He is attentive. He does not perceive auditory or visual hallucinations.         Mood and Affect: Mood normal.         Speech: Speech normal.         Thought Content: Thought content normal.         Cognition and Memory: Cognition is not impaired. Memory is impaired.         Judgment: Judgment normal.         Lab Results   Component Value Date     (L) 07/05/2024    K 3.7 07/05/2024     (H) 07/05/2024    CO2 18 (L) 07/05/2024    BUN 21 07/05/2024    CREATININE 1.09 07/05/2024    GLUCOSE 92 07/05/2024    CALCIUM 8.0 (L) 07/05/2024    PROT 6.3 (L) 07/05/2024    BILITOT 0.6 07/05/2024    ALKPHOS 99 07/05/2024    AST 40 (H) 07/05/2024    ALT 36 07/05/2024    GLOB 2.2 08/31/2023       Lab Results   Component Value Date    WBC 4.2 (L) 07/05/2024    HGB 8.7 (L) 07/05/2024    HCT 28.4 (L) 07/05/2024    MCV 93 07/05/2024     (L) 07/05/2024    LYMPHOPCT 8.1 07/03/2024    RBC 3.06 (L) 07/05/2024    MCH  28.4 07/05/2024    MCHC 30.6 (L) 07/05/2024    RDW 18.7 (H) 07/05/2024    CRP 0.41 04/22/2024       Lab Results   Component Value Date    TSH 2.27 05/28/2024     Lab Results   Component Value Date    LACTATE 1.2 07/03/2024     (H) 05/28/2024       IMAGES:  Encounter Date: 05/28/24   ECG 12 lead   Result Value    Ventricular Rate 92    Atrial Rate 92    OK Interval 140    QRS Duration 92    QT Interval 396    QTC Calculation(Bazett) 489    P Axis 80    R Axis -6    T Axis 53    QRS Count 15    Q Onset 223    P Onset 153    P Offset 201    T Offset 421    QTC Fredericia 456    Narrative    Sinus rhythm with occasional Premature ventricular complexes  Incomplete right bundle branch block  Nonspecific ST abnormality  Prolonged QT  Abnormal ECG  When compared with ECG of 28-OCT-2022 04:53,  Previous ECG has undetermined rhythm, needs review  ST no longer depressed in Anterior leads  See ED provider note for full interpretation and clinical correlation  Confirmed by Rosalinda Moreland ([id number]) on 5/29/2024 10:40:45 PM        No echocardiogram results found for the past 12 months  === 05/28/24 ===    XR CHEST 2 VIEWS    - Impression -  No evidence of acute intrathoracic abnormality.    Signed by: Babak Wiseman 5/28/2024 5:34 PM  Dictation workstation:   [id number]  === 05/28/24 ===    CT ANGIO CHEST ABDOMEN PELVIS    - Impression -  1. No thoracic or abdominal aortic aneurysm or dissection. Moderate  vascular calcification  2. Stable pancreatic cystic lesion seen at the pancreatic head. If  not previously evaluated, consider nonemergent pancreatic MRI to  better interrogate the internal features.  3. Subtle peripancreatic fluid and edema. Correlation with pancreatic  enzymes advised to exclude mild pancreatitis.  4. Emphysema. Bronchial thickening  5. Subtle bladder wall thickening. Correlation with urinalysis is  advised to exclude cystitis    Signed by: Yared Mei 5/28/2024 10:46 PM  Dictation workstation:    [id number]  === 05/28/24 ===    XR CHEST 2 VIEWS    - Impression -  No evidence of acute intrathoracic abnormality.    Signed by: Babak Wiseman 5/28/2024 5:34 PM  Dictation workstation:   [id number]  === 07/02/24 ===    MR ABDOMEN W AND WO CONTRAST    - Impression -  1.  Cystic lesion with a few thin septations in the pancreatic head  and uncinate process measures 3.5 cm and is not significantly changed  since 2022. This is favored to represent a pancreatic side branch  IPMN. Due to its size, advise gastroenterology consultation for  correlation with endoscopic ultrasound/FNA, followed by close  surveillance with MRI alternating with EUS every 3-6 months.  2. Small 4 mm probable side-branch IPMN in the pancreatic tail. This  may also be assessed concurrently at the time of follow-up for the  dominant lesion in the pancreatic head.  3. Additional benign incidental findings, as detailed above.      MACRO:  None    Signed by: Angelina King 7/3/2024 12:23 PM  Dictation workstation:   [id number]    Additional results of the last 24 hours have been reviewed.    Dictated using Calendargod Version 2.4  Proof read however unrecognized voice recognition errors may have occurred     Electronically signed by Nicci Carter DO on 07/05/24 at 4:49 PM

## 2024-07-06 VITALS
SYSTOLIC BLOOD PRESSURE: 110 MMHG | TEMPERATURE: 96.8 F | HEART RATE: 98 BPM | BODY MASS INDEX: 21.98 KG/M2 | OXYGEN SATURATION: 96 % | DIASTOLIC BLOOD PRESSURE: 68 MMHG | HEIGHT: 68 IN | WEIGHT: 145 LBS | RESPIRATION RATE: 18 BRPM

## 2024-07-06 LAB
ALBUMIN SERPL BCP-MCNC: 3.6 G/DL (ref 3.4–5)
ALP SERPL-CCNC: 103 U/L (ref 33–136)
ALT SERPL W P-5'-P-CCNC: 36 U/L (ref 10–52)
ANION GAP SERPL CALC-SCNC: 14 MMOL/L (ref 10–20)
AST SERPL W P-5'-P-CCNC: 44 U/L (ref 9–39)
BILIRUB DIRECT SERPL-MCNC: 0.1 MG/DL (ref 0–0.3)
BILIRUB SERPL-MCNC: 0.5 MG/DL (ref 0–1.2)
BUN SERPL-MCNC: 17 MG/DL (ref 6–23)
CALCIUM SERPL-MCNC: 8.2 MG/DL (ref 8.6–10.3)
CHLORIDE SERPL-SCNC: 108 MMOL/L (ref 98–107)
CO2 SERPL-SCNC: 19 MMOL/L (ref 21–32)
CREAT SERPL-MCNC: 0.99 MG/DL (ref 0.5–1.3)
EGFRCR SERPLBLD CKD-EPI 2021: 77 ML/MIN/1.73M*2
ERYTHROCYTE [DISTWIDTH] IN BLOOD BY AUTOMATED COUNT: 18.8 % (ref 11.5–14.5)
GLUCOSE SERPL-MCNC: 89 MG/DL (ref 74–99)
HCT VFR BLD AUTO: 28.2 % (ref 41–52)
HGB BLD-MCNC: 8.9 G/DL (ref 13.5–17.5)
MAGNESIUM SERPL-MCNC: 1.91 MG/DL (ref 1.6–2.4)
MCH RBC QN AUTO: 28.8 PG (ref 26–34)
MCHC RBC AUTO-ENTMCNC: 31.6 G/DL (ref 32–36)
MCV RBC AUTO: 91 FL (ref 80–100)
NRBC BLD-RTO: 0 /100 WBCS (ref 0–0)
PHOSPHATE SERPL-MCNC: 2.2 MG/DL (ref 2.5–4.9)
PLATELET # BLD AUTO: 119 X10*3/UL (ref 150–450)
POTASSIUM SERPL-SCNC: 3.6 MMOL/L (ref 3.5–5.3)
PROT SERPL-MCNC: 6.2 G/DL (ref 6.4–8.2)
RBC # BLD AUTO: 3.09 X10*6/UL (ref 4.5–5.9)
SODIUM SERPL-SCNC: 137 MMOL/L (ref 136–145)
WBC # BLD AUTO: 4.5 X10*3/UL (ref 4.4–11.3)

## 2024-07-06 PROCEDURE — 2500000001 HC RX 250 WO HCPCS SELF ADMINISTERED DRUGS (ALT 637 FOR MEDICARE OP)

## 2024-07-06 PROCEDURE — 80048 BASIC METABOLIC PNL TOTAL CA: CPT

## 2024-07-06 PROCEDURE — 84075 ASSAY ALKALINE PHOSPHATASE: CPT

## 2024-07-06 PROCEDURE — 99232 SBSQ HOSP IP/OBS MODERATE 35: CPT | Performed by: INTERNAL MEDICINE

## 2024-07-06 PROCEDURE — 36415 COLL VENOUS BLD VENIPUNCTURE: CPT

## 2024-07-06 PROCEDURE — 83735 ASSAY OF MAGNESIUM: CPT

## 2024-07-06 PROCEDURE — G0378 HOSPITAL OBSERVATION PER HR: HCPCS

## 2024-07-06 PROCEDURE — 2500000001 HC RX 250 WO HCPCS SELF ADMINISTERED DRUGS (ALT 637 FOR MEDICARE OP): Performed by: INTERNAL MEDICINE

## 2024-07-06 PROCEDURE — 84100 ASSAY OF PHOSPHORUS: CPT

## 2024-07-06 PROCEDURE — 85027 COMPLETE CBC AUTOMATED: CPT

## 2024-07-06 RX ORDER — ONDANSETRON 4 MG/1
4 TABLET, ORALLY DISINTEGRATING ORAL EVERY 8 HOURS PRN
Qty: 20 TABLET | Refills: 0 | Status: SHIPPED | OUTPATIENT
Start: 2024-07-06

## 2024-07-06 RX ORDER — LOPERAMIDE HYDROCHLORIDE 2 MG/1
2 CAPSULE ORAL 4 TIMES DAILY PRN
Status: DISCONTINUED | OUTPATIENT
Start: 2024-07-06 | End: 2024-07-06 | Stop reason: HOSPADM

## 2024-07-06 RX ORDER — DIPHENOXYLATE HYDROCHLORIDE AND ATROPINE SULFATE 2.5; .025 MG/1; MG/1
1 TABLET ORAL 4 TIMES DAILY PRN
Qty: 30 TABLET | Refills: 0 | Status: SHIPPED | OUTPATIENT
Start: 2024-07-06

## 2024-07-06 RX ORDER — LOPERAMIDE HYDROCHLORIDE 2 MG/1
2 CAPSULE ORAL 4 TIMES DAILY PRN
Qty: 30 CAPSULE | Refills: 0 | Status: SHIPPED | OUTPATIENT
Start: 2024-07-06

## 2024-07-06 RX ORDER — DIPHENOXYLATE HYDROCHLORIDE AND ATROPINE SULFATE 2.5; .025 MG/1; MG/1
1 TABLET ORAL 4 TIMES DAILY PRN
Status: DISCONTINUED | OUTPATIENT
Start: 2024-07-06 | End: 2024-07-06 | Stop reason: HOSPADM

## 2024-07-06 ASSESSMENT — COGNITIVE AND FUNCTIONAL STATUS - GENERAL
DAILY ACTIVITIY SCORE: 24
MOBILITY SCORE: 24

## 2024-07-06 ASSESSMENT — ENCOUNTER SYMPTOMS: DIARRHEA: 1

## 2024-07-06 NOTE — PROGRESS NOTES
[facility] Hospitalist Progress Note       [phone]: Please page me for patient care issues.  [phone]: Please page night hospitalist for any issues.     Bret Rowland  :  1945(79 y.o.)  MRN:  [id number]  PCP: Justin Gallo MD      Principal Problem:    Diarrhea, unspecified      Assessment and Plan:     Oren Rowland is a 79 y.o. 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 Patient  presents to [facility] ED with a chief complaint of 1 day of intractable nausea, vomiting, and watery diarrhea.     #Intractable N/V/Diarreha (resolved N/V, improving diarrhea)  #NARAYAN  #Transaminitis, mild  #RA  #Dementia (mild), type unknown, ongoing work up outpt  #pancreatic cystic (IPMN) ongoing work up outpt  -PRN IVF  -hold home lisinopril  -Stool study negative for infection to date  -trial of imodium and lomotil   -pt has GI appt on 24 outpt    Disposition:anticipate discharge 24    DVT Prophylaxis: Subq Lovenox    Code status: DNR  Diet: Adult diet Regular    Level of MDM:  Moderate    patient and family updated, I personally examined the patient, and I personally reviewed chart, data, labs radiology reports    Family Communication  Number :   Name of Designated Family Representative:       Total time spent: 35 minutes, of total time providing counseling or in coordination of care. Total time on this day of visit includes record and documentation review before and after visit including documentation and time not explicitly included on EMR time stamp      Subjective:   Interval History:  HPI  The patient complains of N/V/D  The patient feels their symptoms areimproving  no events or new concerns    Scheduled Meds:cholecalciferol, 2,000 Units, oral, Daily  colesevelam, 625 mg, oral, Daily  enoxaparin, 40 mg, subcutaneous,  q24h  [Held by provider] lisinopril, 10 mg, oral, Daily  OLANZapine zydis, 5 mg, oral, Nightly  pantoprazole, 40 mg, oral, Daily before breakfast      Continuous Infusions:   PRN Meds:PRN medications: dicyclomine, diphenoxylate-atropine, loperamide, melatonin, OLANZapine zydis, ondansetron ODT **OR** ondansetron    Review of Systems   All other systems reviewed and are negative.    Interval Pertinent History:  Social History     Tobacco Use    Smoking status: Former     Current packs/day: 0.00     Average packs/day: 1 pack/day for 11.0 years (11.0 ttl pk-yrs)     Types: Cigarettes     Start date: 1975     Quit date: 1981     Years since quittin.5    Smokeless tobacco: Never   Substance Use Topics    Alcohol use: Not Currently     Alcohol/week: 10.0 standard drinks of alcohol     Types: 10 Shots of liquor per week         Objective:   Patient Vitals for the past 24 hrs:   BP Temp Temp src Pulse Resp SpO2   24 0639 110/68 36 °C (96.8 °F) Temporal 98 18 96 %   24 2030 -- -- -- -- -- 97 %   24 108/70 36 °C (96.8 °F) Temporal 99 18 96 %   24 1838 140/78 36.7 °C (98.1 °F) Temporal 98 18 96 %   24 0924 -- -- -- -- -- 96 %       Average, Min, and Max for last 24 hours Vitals:  TEMPERATURE:  Temp  Av.2 °C (97.2 °F)  Min: 36 °C (96.8 °F)  Max: 36.7 °C (98.1 °F)    RESPIRATIONS RANGE: Resp  Av  Min: 18  Max: 18    PULSE RANGE: Pulse  Av.3  Min: 98  Max: 99    BLOOD PRESSURE RANGE:  Systolic (24hrs), Av , Min:108 , Max:140   ; Diastolic (24hrs), Av, Min:68, Max:78      PULSE OXIMETRY RANGE: SpO2  Av.2 %  Min: 96 %  Max: 97 %  Body mass index is 22.05 kg/m².    I/O last 3 completed shifts:  In: 1118.8 (17 mL/kg) [P.O.:240; I.V.:878.8 (13.4 mL/kg)]  Out: 400 (6.1 mL/kg) [Urine:400 (0.2 mL/kg/hr)]  Weight: 65.8 kg   Weight change:      Physical Exam  Vitals and nursing note reviewed.   Constitutional:       General: He is not in acute distress.      Appearance: Normal appearance. He is not ill-appearing.   HENT:      Head: Normocephalic and atraumatic.      Right Ear: External ear normal.      Left Ear: External ear normal.      Nose: Nose normal. No congestion or rhinorrhea.      Mouth/Throat:      Mouth: Mucous membranes are moist.   Eyes:      Extraocular Movements: Extraocular movements intact.      Pupils: Pupils are equal, round, and reactive to light.   Cardiovascular:      Rate and Rhythm: Regular rhythm.      Pulses: Normal pulses.      Heart sounds: Normal heart sounds.   Pulmonary:      Effort: Pulmonary effort is normal.      Breath sounds: Normal breath sounds.   Abdominal:      General: Abdomen is flat. Bowel sounds are normal.      Palpations: Abdomen is soft.   Musculoskeletal:         General: Normal range of motion.      Cervical back: Normal range of motion and neck supple.      Right lower leg: No edema.      Left lower leg: No edema.   Skin:     General: Skin is warm and dry.      Capillary Refill: Capillary refill takes less than 2 seconds.   Neurological:      General: No focal deficit present.      Mental Status: He is alert and oriented to person, place, and time. Mental status is at baseline.   Psychiatric:         Attention and Perception: He is attentive. He does not perceive auditory or visual hallucinations.         Mood and Affect: Mood normal.         Speech: Speech normal.         Thought Content: Thought content normal.         Cognition and Memory: Cognition is not impaired. Memory is impaired.         Judgment: Judgment normal.         Lab Results   Component Value Date     07/06/2024    K 3.6 07/06/2024     (H) 07/06/2024    CO2 19 (L) 07/06/2024    BUN 17 07/06/2024    CREATININE 0.99 07/06/2024    GLUCOSE 89 07/06/2024    CALCIUM 8.2 (L) 07/06/2024    PROT 6.2 (L) 07/06/2024    BILITOT 0.5 07/06/2024    ALKPHOS 103 07/06/2024    AST 44 (H) 07/06/2024    ALT 36 07/06/2024    GLOB 2.2 08/31/2023       Lab Results    Component Value Date    WBC 4.5 07/06/2024    HGB 8.9 (L) 07/06/2024    HCT 28.2 (L) 07/06/2024    MCV 91 07/06/2024     (L) 07/06/2024    LYMPHOPCT 8.1 07/03/2024    RBC 3.09 (L) 07/06/2024    MCH 28.8 07/06/2024    MCHC 31.6 (L) 07/06/2024    RDW 18.8 (H) 07/06/2024    CRP 0.41 04/22/2024       Lab Results   Component Value Date    TSH 2.27 05/28/2024     Lab Results   Component Value Date    LACTATE 1.2 07/03/2024     (H) 05/28/2024       IMAGES:  Encounter Date: 05/28/24   ECG 12 lead   Result Value    Ventricular Rate 92    Atrial Rate 92    SD Interval 140    QRS Duration 92    QT Interval 396    QTC Calculation(Bazett) 489    P Axis 80    R Axis -6    T Axis 53    QRS Count 15    Q Onset 223    P Onset 153    P Offset 201    T Offset 421    QTC Fredericia 456    Narrative    Sinus rhythm with occasional Premature ventricular complexes  Incomplete right bundle branch block  Nonspecific ST abnormality  Prolonged QT  Abnormal ECG  When compared with ECG of 28-OCT-2022 04:53,  Previous ECG has undetermined rhythm, needs review  ST no longer depressed in Anterior leads  See ED provider note for full interpretation and clinical correlation  Confirmed by Rosalinda Moreland ([id number]) on 5/29/2024 10:40:45 PM        No echocardiogram results found for the past 12 months  === 05/28/24 ===    XR CHEST 2 VIEWS    - Impression -  No evidence of acute intrathoracic abnormality.    Signed by: Babak Wiseman 5/28/2024 5:34 PM  Dictation workstation:   [id number]  === 05/28/24 ===    CT ANGIO CHEST ABDOMEN PELVIS    - Impression -  1. No thoracic or abdominal aortic aneurysm or dissection. Moderate  vascular calcification  2. Stable pancreatic cystic lesion seen at the pancreatic head. If  not previously evaluated, consider nonemergent pancreatic MRI to  better interrogate the internal features.  3. Subtle peripancreatic fluid and edema. Correlation with pancreatic  enzymes advised to exclude mild  pancreatitis.  4. Emphysema. Bronchial thickening  5. Subtle bladder wall thickening. Correlation with urinalysis is  advised to exclude cystitis    Signed by: Yared Mei 5/28/2024 10:46 PM  Dictation workstation:   [id number]  === 05/28/24 ===    XR CHEST 2 VIEWS    - Impression -  No evidence of acute intrathoracic abnormality.    Signed by: Babak Wiseman 5/28/2024 5:34 PM  Dictation workstation:   [id number]  === 07/02/24 ===    MR ABDOMEN W AND WO CONTRAST    - Impression -  1.  Cystic lesion with a few thin septations in the pancreatic head  and uncinate process measures 3.5 cm and is not significantly changed  since 2022. This is favored to represent a pancreatic side branch  IPMN. Due to its size, advise gastroenterology consultation for  correlation with endoscopic ultrasound/FNA, followed by close  surveillance with MRI alternating with EUS every 3-6 months.  2. Small 4 mm probable side-branch IPMN in the pancreatic tail. This  may also be assessed concurrently at the time of follow-up for the  dominant lesion in the pancreatic head.  3. Additional benign incidental findings, as detailed above.      MACRO:  None    Signed by: Angelina King 7/3/2024 12:23 PM  Dictation workstation:   [id number]    Additional results of the last 24 hours have been reviewed.    Dictated using Appiterate Version 2.4  Proof read however unrecognized voice recognition errors may have occurred     Electronically signed by Nicci Carter DO on 07/06/24 at 7:38 AM

## 2024-07-06 NOTE — CARE PLAN
The patient's goals for the shift include reduced episodes of diarrhea    The clinical goals for the shift include less than 2 episodes of diarrhea

## 2024-07-06 NOTE — DISCHARGE SUMMARY
[facility] Hospitalist Discharge Summary and Transition Note           Bret Rowland                  :  1945                     MRN:  [id number]     ADMIT DATE:  7/3/2024            DISCHARGE DATE:  2024     PRIMARY CARE PHYSICIAN:  Justin Gallo MD     VISIT STATUS: Observation     CODE STATUS:  DNR     DISCHARGE DIAGNOSES:    Principal Problem:    Diarrhea, unspecified       HOSPITAL COURSE:    Oren Rowland is a 79 y.o. 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 Patient  presents to [facility] ED with a chief complaint of 1 day of intractable nausea, vomiting, and watery diarrhea. Stool study negative for infection to date.  Nausea and vomiting resolved on admission however diarrhea persisted albeit with significant improvement.  Diarrhea was controlled with as needed Lomotil and Imodium.  NARAYAN resolved with IV fluids.  Patient was discharged home with instructions to follow-up with his primary GI.     #Intractable N/V/Diarreha (resolved N/V, improving diarrhea)  #NARAYAN  #Transaminitis, mild  #RA  #Dementia (mild), type unknown, ongoing work up outpt  #pancreatic cystic (IPMN) ongoing work up outpt  -Stool study negative for infection to date  -trial of imodium and lomotil   -pt has GI appt on 24 outpt     PROCEDURES:  None  CONSULTANTS:  None    COMPLEXITY OF FOLLOW UP:  [] Moderate Complexity: follow up within 7-14 calendar days (87516)  [x]Severe Complexity: follow up within 7 calendar days (68107)     FOLLOW UP TESTING, PENDING RESULTS ORREFERRALS AT TRANSITIONAL CARE VISIT:   []Yes   [] No     DISCHARGE MEDICATIONS:        Significant Medication Changes:        Your medication list        START taking these medications        Instructions Last Dose Given Next Dose Due   diphenoxylate-atropine 2.5-0.025 mg  tablet  Commonly known as: Lomotil      Take 1 tablet by mouth 4 times a day as needed for diarrhea (2ND LINE).       loperamide 2 mg capsule  Commonly known as: Imodium      Take 1 capsule (2 mg) by mouth 4 times a day as needed for diarrhea (1ST LINE).       ondansetron ODT 4 mg disintegrating tablet  Commonly known as: Zofran-ODT      Take 1 tablet (4 mg) by mouth every 8 hours if needed for nausea or vomiting.              CHANGE how you take these medications        Instructions Last Dose Given Next Dose Due   lisinopril 10 mg tablet  Start taking on: July 8, 2024  What changed:   additional instructions  These instructions start on July 8, 2024. If you are unsure what to do until then, ask your doctor or other care provider.      Take 1 tablet (10 mg) by mouth once daily. Do not start taking this medication again until 7/6/24. Do not fill before July 8, 2024.              CONTINUE taking these medications        Instructions Last Dose Given Next Dose Due   cholecalciferol 125 MCG (5000 UT) capsule  Commonly known as: Vitamin D-3           colesevelam 625 mg tablet  Commonly known as: WelChoL      Take 2 tablets twice a day       ibandronate 150 mg tablet  Commonly known as: Boniva      Take 1 tablet (150 mg) by mouth every 30 (thirty) days.       pantoprazole 40 mg EC tablet  Commonly known as: ProtoNix      Take 1 tablet (40 mg) by mouth once daily in the morning. Take before meals. Do not crush, chew, or split.              STOP taking these medications      azaTHIOprine 50 mg tablet  Commonly known as: Imuran                  Where to Get Your Medications        These medications were sent to [facility] [address] [place] [address], [place] - [address] [address]      Phone: [phone]   diphenoxylate-atropine 2.5-0.025 mg tablet  loperamide 2 mg capsule  ondansetron ODT 4 mg disintegrating tablet       Information about where to get these medications is not yet available     Ask your nurse or doctor about these medications  lisinopril 10 mg tablet           DIET: regular      DISPOSITION: Home     Follow up with Justin Gallo MD  .        DISCHARGE TIME: > 30 minutes     Electronically signed by Nicci Carter DO on 07/06/24 at 7:39 AM      Dictated using SavvyCard Version 2.4  Proof read however unrecognized voice recognition errors may have occurred

## 2024-07-08 ENCOUNTER — PATIENT OUTREACH (OUTPATIENT)
Dept: PRIMARY CARE | Facility: CLINIC | Age: 79
End: 2024-07-08
Payer: MEDICARE

## 2024-07-08 NOTE — PROGRESS NOTES
Discharge Facility: [facility]  Discharge Diagnosis: Diarrhea  Admission Date: 7/3/24  Discharge Date: 7/6/24    PCP Appointment Date: 7/12/24  Specialist Appointment Date: Unknown  Hospital Encounter and Summary: Linked       Two attempts were made to reach patient within two business days after discharge. Voicemail left with contact information for patient to call back with any non-emergent questions or concerns.    Stiven Haley LPN

## 2024-07-11 ENCOUNTER — APPOINTMENT (OUTPATIENT)
Dept: BEHAVIORAL HEALTH | Facility: HOSPITAL | Age: 79
End: 2024-07-11
Payer: MEDICARE

## 2024-07-11 ENCOUNTER — APPOINTMENT (OUTPATIENT)
Dept: GERIATRIC MEDICINE | Facility: HOSPITAL | Age: 79
End: 2024-07-11
Payer: MEDICARE

## 2024-07-11 ENCOUNTER — DOCUMENTATION (OUTPATIENT)
Dept: GASTROENTEROLOGY | Facility: HOSPITAL | Age: 79
End: 2024-07-11
Payer: MEDICARE

## 2024-07-11 NOTE — PROGRESS NOTES
Dr. Hwang's office received a referral from Dr. Prashant Dean for this patient to undergo an EUS for a pancreatic cyst. Dr. Hwang reviewed the referral on 7/10/2024. Per Dr. Hwang, OK to schedule with anesthesia.    Maria Luisa Doherty was notified to call and schedule this patient. Contact Erna Mejia RN at [phone] for any questions.      See below for supporting clinical information from the referral sent by Dr. Dean's office and from medical records:    Reason for Referral: Pancreatic Cyst    MR abdomen w and wo IV contrast  Status: Final result  PACS Images  Show images for MR abdomen w and wo IV contrast  Signed by  Signed  Time    Phone  Pager  Angelina King MD    7/03/2024 12:23         [phone]      Exam Information  Status  Exam Begun    Exam Ended  Final     7/02/2024 10:14         7/02/2024 11:29  MR abdomen w and wo IV contrast (Order [id number])  Study Result    Narrative & Impression  Interpreted By:  Angelina King,   STUDY:  MR ABDOMEN W AND WO IV CONTRAST;  7/2/2024 11:29 am      INDICATION:  Signs/Symptoms:cystic head lesion of pancreas.      COMPARISON:  CT chest abdomen and pelvis 10/22/2022 and 05/28/2024.      ACCESSION NUMBER(S):  [id number]      ORDERING CLINICIAN:  EAMON GRAY      TECHNIQUE:  MRI PANCREAS; Multiplanar magnetic resonance images of the abdomen  were obtained including the following sequences; T2-weighted SSFSE  with and without fat saturation, T1-weighted GRE in/opposed phase,  DWI, fat saturated 3D-T1w GRE pre and dynamically post contrast.  Radial thick slab T2w RARE MRCP and coronally reconstructed navigator  gated high resolution 3-D T2w RESTORE MRCP with MIP reconstruction  were also performed for MRCP.  14 mL of Dotarem was administered  intravenously without immediate complication.      FINDINGS:  LIVER:  Liver is normal in size, contour and signal intensity. Subcentimeter  simple hepatic cyst noted in the left liver lobe within segment 2  and  3. No solid liver mass. Subcentimeter focus of susceptibility  artifact without enhancement in the dome of the right liver lobe  represents a calcification better seen on prior CT.      BILE DUCTS:  No intrahepatic or extrahepatic bile duct dilatation is demonstrated.      GALLBLADDER:  Within normal limits.      PANCREAS:  There is moderate fatty replacement of the pancreatic head and mild  fatty replacement of the pancreatic body and tail. Slight luminal  irregularity is noted in the pancreatic body and tail on 3D MRCP  sequences, which may be due to artifact or sequela of prior  pancreatitis.      In the pancreatic head and uncinate process, there is a T2  hyperintense nonenhancing cystic lesion with a few thin internal  septations, which likely communicates with the pancreatic duct on 3D  MRCP sequences. This measures 3.5 x 2.8 x 1.5 cm (cc by transverse by  AP) and is not significantly changed since October 2022 when  accounting for differences in technique. No thick enhancing  septations or enhancing nodular components. No associated main  pancreatic ductal dilation or solid mass. A tiny 4 mm cystic lesion  is also noted in the pancreatic tail (series 12, image 23) without  complex features. Both of these lesions likely represent side branch  IPMNs.      SPLEEN:  Within normal limits.      ADRENAL GLANDS:  Within normal limits.      KIDNEYS:  There is a small focal area of renal cortical scarring in the lateral  aspect of the left mid kidney with adjacent caliectasis.  Subcentimeter simple renal cyst in the left upper pole. Subcentimeter  T1 hyperintense hemorrhagic/proteinaceous renal cyst in the anterior  right mid kidney. No hydronephrosis.      LYMPH NODES:  No lymphadenopathy.      ABDOMINAL VESSELS:  Aorta and the major abdominal arterial vessels demonstrate no gross  abnormality.      BOWEL:  Within normal limits.      PERITONEUM/RETROPERITONEUM:  No ascites.      BONES AND LOWER THORAX:  Stable L3  vertebral body hemangioma noted. No aggressive bone lesion.  Multilevel lumbar spine degenerative disc disease. Lower thoracic  structures are unremarkable.      IMPRESSION:  1.  Cystic lesion with a few thin septations in the pancreatic head  and uncinate process measures 3.5 cm and is not significantly changed  since 2022. This is favored to represent a pancreatic side branch  IPMN. Due to its size, advise gastroenterology consultation for  correlation with endoscopic ultrasound/FNA, followed by close  surveillance with MRI alternating with EUS every 3-6 months.  2. Small 4 mm probable side-branch IPMN in the pancreatic tail. This  may also be assessed concurrently at the time of follow-up for the  dominant lesion in the pancreatic head.  3. Additional benign incidental findings, as detailed above.          CT angio chest abdomen pelvis  Status: Final result  PACS Images  Exam Information  Status  Exam Begun    Exam Ended  Final     5/28/2024 21:19         5/28/2024 21:39  Study Result  Narrative & Impression  Interpreted By:  Yared Mei,   STUDY:  CT ANGIO CHEST ABDOMEN PELVIS;  5/28/2024 9:39 pm      INDICATION:  Signs/Symptoms:shoulder pain, hip pain, mottled skin.      COMPARISON:  10/2022      ACCESSION NUMBER(S):  [id number]      ORDERING CLINICIAN:  AURY NORMAN      TECHNIQUE:  Axial non-contrast images of the chest, abdomen, and pelvis  with  coronal and sagittal reformatted images. Axial CT images of the  chest, abdomen and pelvis after the intravenous administration of of  intravenous contrast using CT angiographic technique with coronal and  sagittal reformatted images.  MIP images were provided and reviewed.  3D reconstructions were performed on a separate independent  workstation.      FINDINGS:  VASCULAR:      PULMONARY ARTERIES:   No acute pulmonary embolism.      THORACIC AORTA:  Non-contrast images show no evidence of acute  intramural hematoma. No thoracic aortic aneurysm or  dissection.  Moderate thoracic aortic calcification. Anatomic pattern of great  vessels off the arch is normal.      ABDOMINAL AORTA: Moderate vascular calcification. No aneurysm. This  includes the origin of both renal arteries.          CHEST:      MEDIASTINUM AND LYMPH NODES: No enlarged intrathoracic or axillary  lymph nodes.      HEART: Normal size.  No coronary artery calcifications. No  pericardial effusion.      Lung fields:  Pronounced emphysema. Bronchial thickening. No pneumothorax. No  pleural effusions. Changes suggestive of old healed granulomas  disease. No pericardial effusion.          Small sliding hiatal hernia      ABDOMEN/PELVIS:      Arterial phase imaging limits evaluation of the solid organs.      LIVER: Heterogeneous appearance. Small left hepatic cysts  BILE DUCTS: Within normal limits.  GALLBLADDER: Within normal limits. No radiopaque gallstone.  PANCREAS: Cystic lesion seen in the head of the pancreas again  detected measuring 2.0 x 3.4 cm. Additionally there is slight  peripancreatic stranding. Correlation with pancreatic enzymes advised  to exclude mild pancreatitis.      SPLEEN: Within normal limits.  ADRENALS: Within normal limits.  KIDNEYS, URETERS, BLADDER: Within normal limits. No hydronephrosis,  stone, or focal lesion evident. Equivocal bladder wall thickening.  Correlation with urinalysis advised.      VESSELS: See above.  No additional significant abnormality.  LYMPH NODES: No enlarged lymph nodes.  RETROPERITONEUM:  No significant abnormality.      BOWEL: Stomach is unremarkable. No inflammatory bowel wall thickening  or abnormal dilatation of the large or small bowel. Normal appendix.  Mild diverticulosis. No focal bowel wall thickening.      PERITONEUM:   No significant ascites, free air, or fluid collection.      REPRODUCTIVE ORGANS: Within normal limits.      OSSEOUS STRUCTURES:  No acute osseous abnormality.  ABDOMINAL WALL: Within normal limits.      IMPRESSION:  1. No  thoracic or abdominal aortic aneurysm or dissection. Moderate  vascular calcification  2. Stable pancreatic cystic lesion seen at the pancreatic head. If  not previously evaluated, consider nonemergent pancreatic MRI to  better interrogate the internal features.  3. Subtle peripancreatic fluid and edema. Correlation with pancreatic  enzymes advised to exclude mild pancreatitis.  4. Emphysema. Bronchial thickening  5. Subtle bladder wall thickening. Correlation with urinalysis is  advised to exclude cystitis    Labs from 2024:  Bilirubin, Total: 0.5  ALT: 36  AST: 44 H  Alkaline Phosphatase: 103    Lipase from 7/3/2024: 94 H  Amylase from 2024: 87    Bret Rowland                  :  1945                     MRN:  [id number]    ADMIT DATE:  7/3/2024            DISCHARGE DATE:  2024    PRIMARY CARE PHYSICIAN:  Justin Gallo MD    VISIT STATUS: Observation    CODE STATUS:  DNR    DISCHARGE DIAGNOSES:    Principal Problem:    Diarrhea, unspecified       HOSPITAL COURSE:    Oren Rowland is a 79 y.o. 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 Patient  presents to [facility] ED with a chief complaint of 1 day of intractable nausea, vomiting, and watery diarrhea. Stool study negative for infection to date.  Nausea and vomiting resolved on admission however diarrhea persisted albeit with significant improvement.  Diarrhea was controlled with as needed Lomotil and Imodium.  NARAYAN resolved with IV fluids.  Patient was discharged home with instructions to follow-up with his primary GI.

## 2024-07-12 ENCOUNTER — OFFICE VISIT (OUTPATIENT)
Dept: PRIMARY CARE | Facility: CLINIC | Age: 79
End: 2024-07-12
Payer: MEDICARE

## 2024-07-12 VITALS
TEMPERATURE: 97.8 F | BODY MASS INDEX: 22.7 KG/M2 | WEIGHT: 149.8 LBS | HEIGHT: 68 IN | DIASTOLIC BLOOD PRESSURE: 64 MMHG | OXYGEN SATURATION: 97 % | SYSTOLIC BLOOD PRESSURE: 122 MMHG | HEART RATE: 103 BPM

## 2024-07-12 DIAGNOSIS — F03.90 DEMENTIA, UNSPECIFIED DEMENTIA SEVERITY, UNSPECIFIED DEMENTIA TYPE, UNSPECIFIED WHETHER BEHAVIORAL, PSYCHOTIC, OR MOOD DISTURBANCE OR ANXIETY (MULTI): ICD-10-CM

## 2024-07-12 DIAGNOSIS — R74.01 TRANSAMINITIS: ICD-10-CM

## 2024-07-12 DIAGNOSIS — K59.1 FUNCTIONAL DIARRHEA: ICD-10-CM

## 2024-07-12 DIAGNOSIS — K86.2 PANCREATIC CYST (HHS-HCC): ICD-10-CM

## 2024-07-12 DIAGNOSIS — Z09 HOSPITAL DISCHARGE FOLLOW-UP: Primary | ICD-10-CM

## 2024-07-12 PROCEDURE — 1160F RVW MEDS BY RX/DR IN RCRD: CPT | Performed by: FAMILY MEDICINE

## 2024-07-12 PROCEDURE — 1157F ADVNC CARE PLAN IN RCRD: CPT | Performed by: FAMILY MEDICINE

## 2024-07-12 PROCEDURE — 3074F SYST BP LT 130 MM HG: CPT | Performed by: FAMILY MEDICINE

## 2024-07-12 PROCEDURE — 99495 TRANSJ CARE MGMT MOD F2F 14D: CPT | Performed by: FAMILY MEDICINE

## 2024-07-12 PROCEDURE — 1159F MED LIST DOCD IN RCRD: CPT | Performed by: FAMILY MEDICINE

## 2024-07-12 PROCEDURE — 3078F DIAST BP <80 MM HG: CPT | Performed by: FAMILY MEDICINE

## 2024-07-12 PROCEDURE — 1036F TOBACCO NON-USER: CPT | Performed by: FAMILY MEDICINE

## 2024-07-12 PROCEDURE — 1126F AMNT PAIN NOTED NONE PRSNT: CPT | Performed by: FAMILY MEDICINE

## 2024-07-12 ASSESSMENT — ENCOUNTER SYMPTOMS
LOSS OF SENSATION IN FEET: 0
PALPITATIONS: 0
DEPRESSION: 0
COUGH: 0
SHORTNESS OF BREATH: 0
OCCASIONAL FEELINGS OF UNSTEADINESS: 0
ABDOMINAL DISTENTION: 0

## 2024-07-12 ASSESSMENT — PATIENT HEALTH QUESTIONNAIRE - PHQ9
SUM OF ALL RESPONSES TO PHQ9 QUESTIONS 1 AND 2: 0
2. FEELING DOWN, DEPRESSED OR HOPELESS: NOT AT ALL
1. LITTLE INTEREST OR PLEASURE IN DOING THINGS: NOT AT ALL

## 2024-07-12 ASSESSMENT — PAIN SCALES - GENERAL: PAINLEVEL: 0-NO PAIN

## 2024-07-12 NOTE — PROGRESS NOTES
"Patient: Bret Martinez"Oren\"  : 1945  PCP: Justin Gallo MD  MRN: [id number]  Program: Transitional Care Management  Status: Enrolled  Effective Dates: 2024 - present  Responsible Staff: Stiven Haley LPN  Social Determinants to be Addressed: Physical Activity, Social Connections, Stress, Tobacco Use         Bret Almazan" is a 79 y.o. male presenting today for follow-up after being discharged from the hospital 6 days ago. The main problem requiring admission was diarrhea. The discharge summary and/or Transitional Care Management documentation was reviewed. Medication reconciliation was performed as indicated via the \"Bernardo as Reviewed\" timestamp.     Bret Estevez\" was contacted by Transitional Care Management services two days after his discharge. This encounter and supporting documentation was reviewed.    Review of Systems   Respiratory:  Negative for cough and shortness of breath.    Cardiovascular:  Negative for chest pain and palpitations.   Gastrointestinal:  Negative for abdominal distention.       /64   Pulse 103   Temp 36.6 °C (97.8 °F) (Temporal)   Ht 1.727 m (5' 8\")   Wt 67.9 kg (149 lb 12.8 oz)   SpO2 97%   BMI 22.78 kg/m²     Physical Exam  Vitals reviewed.   Constitutional:       Appearance: Normal appearance.   Cardiovascular:      Rate and Rhythm: Normal rate and regular rhythm.      Heart sounds: Normal heart sounds. No murmur heard.  Pulmonary:      Breath sounds: Normal breath sounds.   Abdominal:      General: Abdomen is flat. Bowel sounds are normal.      Palpations: Abdomen is soft.   Musculoskeletal:         General: No swelling.   Neurological:      Mental Status: He is alert.         The complexity of medical decision making for this patient's transitional care is moderate.    Assessment/Plan   Diagnoses and all orders for this visit:  Hospital discharge follow-up  Dementia, unspecified dementia severity, unspecified dementia type, " unspecified whether behavioral, psychotic, or mood disturbance or anxiety (Multi)  -     Follow Up In Primary Care - Established  Functional diarrhea  Transaminitis  Comments:  Resolved to normal at time of discharge  Pancreatic cyst ([facility])  Comments:  Going for outpatient endoscopic ultrasound for pancreatic cyst with Dr. Hwang  Encouraged patient to follow-up with GI  Discussed with patient that is unable to give an accurate diagnosis but likely a cyst which is benign.  If a fine-needle aspiration is needed that would be be determined by the gastroenterologist

## 2024-07-12 NOTE — PROGRESS NOTES
"Subjective   Patient ID: Oren Rowland is a 79 y.o. male who presents for hospital fu.    HPI     Review of Systems    Objective   /64   Pulse 103   Temp 36.6 °C (97.8 °F) (Temporal)   Ht 1.727 m (5' 8\")   Wt 67.9 kg (149 lb 12.8 oz)   SpO2 97%   BMI 22.78 kg/m²     Physical Exam    Assessment/Plan          "

## 2024-07-15 ENCOUNTER — PATIENT MESSAGE (OUTPATIENT)
Dept: PRIMARY CARE | Facility: CLINIC | Age: 79
End: 2024-07-15
Payer: MEDICARE

## 2024-07-15 DIAGNOSIS — K21.9 GASTROESOPHAGEAL REFLUX DISEASE, UNSPECIFIED WHETHER ESOPHAGITIS PRESENT: Chronic | ICD-10-CM

## 2024-07-15 RX ORDER — PANTOPRAZOLE SODIUM 40 MG/1
40 TABLET, DELAYED RELEASE ORAL
Qty: 30 TABLET | Refills: 5 | Status: SHIPPED | OUTPATIENT
Start: 2024-07-15

## 2024-07-19 ENCOUNTER — PATIENT OUTREACH (OUTPATIENT)
Dept: PRIMARY CARE | Facility: CLINIC | Age: 79
End: 2024-07-19
Payer: MEDICARE

## 2024-07-30 ENCOUNTER — TELEPHONE (OUTPATIENT)
Dept: PRIMARY CARE | Facility: CLINIC | Age: 79
End: 2024-07-30
Payer: MEDICARE

## 2024-07-30 DIAGNOSIS — I10 PRIMARY HYPERTENSION: ICD-10-CM

## 2024-07-30 RX ORDER — LISINOPRIL 10 MG/1
10 TABLET ORAL DAILY
Qty: 30 TABLET | Refills: 5 | Status: SHIPPED | OUTPATIENT
Start: 2024-07-30 | End: 2024-07-30 | Stop reason: SDUPTHER

## 2024-07-30 RX ORDER — LISINOPRIL 10 MG/1
10 TABLET ORAL DAILY
Qty: 30 TABLET | Refills: 5 | Status: SHIPPED | OUTPATIENT
Start: 2024-07-30

## 2024-07-30 NOTE — TELEPHONE ENCOUNTER
[facility] called asking about the Mychart msg and FU up on the medication. She mentioned that while he was in the hospital for 3 days that they changed the provider to the one he saw in the hospital. He is not able to get his script as it was cancelled by them. She is asking if you are able to place new order so he is able to PU his medication.

## 2024-08-06 ENCOUNTER — PATIENT OUTREACH (OUTPATIENT)
Dept: PRIMARY CARE | Facility: CLINIC | Age: 79
End: 2024-08-06
Payer: MEDICARE

## 2024-08-19 ENCOUNTER — APPOINTMENT (OUTPATIENT)
Dept: RHEUMATOLOGY | Facility: CLINIC | Age: 79
End: 2024-08-19
Payer: MEDICARE

## 2024-08-19 VITALS
HEIGHT: 68 IN | SYSTOLIC BLOOD PRESSURE: 118 MMHG | WEIGHT: 159 LBS | HEART RATE: 101 BPM | OXYGEN SATURATION: 96 % | BODY MASS INDEX: 24.1 KG/M2 | DIASTOLIC BLOOD PRESSURE: 68 MMHG

## 2024-08-19 DIAGNOSIS — Z79.899 ENCOUNTER FOR LONG-TERM (CURRENT) USE OF MEDICATIONS: ICD-10-CM

## 2024-08-19 DIAGNOSIS — M13.80 SERONEGATIVE ARTHRITIS: Primary | ICD-10-CM

## 2024-08-19 DIAGNOSIS — M81.0 OSTEOPOROSIS, UNSPECIFIED OSTEOPOROSIS TYPE, UNSPECIFIED PATHOLOGICAL FRACTURE PRESENCE: ICD-10-CM

## 2024-08-19 PROCEDURE — 1036F TOBACCO NON-USER: CPT | Performed by: INTERNAL MEDICINE

## 2024-08-19 PROCEDURE — 99214 OFFICE O/P EST MOD 30 MIN: CPT | Performed by: INTERNAL MEDICINE

## 2024-08-19 PROCEDURE — 1157F ADVNC CARE PLAN IN RCRD: CPT | Performed by: INTERNAL MEDICINE

## 2024-08-19 RX ORDER — PREDNISONE 5 MG/1
5 TABLET ORAL DAILY PRN
Qty: 90 TABLET | Refills: 0 | Status: SHIPPED | OUTPATIENT
Start: 2024-08-19 | End: 2024-11-17

## 2024-08-19 ASSESSMENT — ENCOUNTER SYMPTOMS
SLEEP DISTURBANCE: 1
BRUISES/BLEEDS EASILY: 1
SHORTNESS OF BREATH: 1
FATIGUE: 1

## 2024-08-19 NOTE — PROGRESS NOTES
"Subjective   Patient ID: Bret Rowland \"Oren\" is a 79 y.o. male who presents for Follow-up (3 Month FUV).  HPI  Patient with history of seronegative rheumatoid arthritis, osteoarthritis, and osteoporosis previously seen by Dr. Avila and Dr. Louis.  Previous medications have included Plaquenil    .  He did have some mild liver elevations and his ultrasound did show fibrosis.  We had him change to azathioprine but then noted he had a hospitalization with an episode of pancreatitis and discontinued.  He retry the medication and it did aggravate his system again.    He is now back on leflunomide 20 mg.  He may take prednisone every 10 to 12 days but he has been taking Excedrin extra strength sometimes 8 a day.    His hands and fist are sometimes sore and in the morning he has stiffness that may take 1 to 2 hours.  Sometimes he has some edema in his lower extremities and he feels that his toes are swollen    He was started on pantoprazole in the hospital.    He continues to be on ibandronate.    Review of Systems   Constitutional:  Positive for fatigue.   Eyes:         Wears glasses   Respiratory:  Positive for shortness of breath.         Previous smoker quit 35 years ago   Cardiovascular:  Negative for chest pain.        Hypertension, had full cardiac workup for his shortness of breath on exertion with cardiology and was negative.   Gastrointestinal:         Had  h/o microscopic colitis.  Now controlling it with diet.  Previously had been on budesonide and colestipol.   Musculoskeletal:         Per HPI   Hematological:  Bruises/bleeds easily.   Psychiatric/Behavioral:  Positive for sleep disturbance.        Objective   Physical Exam  GEN: NAD A&O x3 appropriate affect able to get onto the exam table  EYES:  no conjunctival redness, eyelids normal, wears glasses  SKIN: no rashes, ulcers, or subcutaneous nodules  PULSES: +radials  TENDER POINTS: 0/18   MSK:  Contracture Dupuytren's of the fifth digit on the right but " no synovitis in DIP PIP MCP wrist elbows no tenderness in the proximal upper or lower extremities no swelling of the knees or ankles  Assessment/Plan        Seronegative rheumatoid arthritis versus PMR symptoms.  Uncertain of his diagnosis since he has been on medicines for quite some time.  Previous medications include azathioprine which exacerbated pancreatitis   -Currently on leflunomide 20 mg and as needed prednisone.  He has been taking Excedrin 8 a day and told him there are risks to this.  He does have fibrotic liver changes.   -Will recheck his blood work and if there are is because may consider adjusting his medications again.  Discussed possibility of using Orencia or anti-TNF inhibitor    Osteoporosis last bone density was 5/17/2022   -Left femoral neck -2.5.  Currently on ibandronate.  He was started on pantoprazole in the hospital   -He is due for bone density    Osteoarthritis        Will have him come back in 4 months or as needed.

## 2024-08-20 ENCOUNTER — LAB (OUTPATIENT)
Dept: LAB | Facility: LAB | Age: 79
End: 2024-08-20
Payer: MEDICARE

## 2024-08-20 DIAGNOSIS — Z79.899 ENCOUNTER FOR LONG-TERM (CURRENT) USE OF MEDICATIONS: ICD-10-CM

## 2024-08-20 DIAGNOSIS — M81.0 OSTEOPOROSIS, UNSPECIFIED OSTEOPOROSIS TYPE, UNSPECIFIED PATHOLOGICAL FRACTURE PRESENCE: ICD-10-CM

## 2024-08-20 DIAGNOSIS — M13.80 SERONEGATIVE ARTHRITIS: ICD-10-CM

## 2024-08-20 LAB
ALBUMIN SERPL BCP-MCNC: 4 G/DL (ref 3.4–5)
ALP SERPL-CCNC: 73 U/L (ref 33–136)
ALT SERPL W P-5'-P-CCNC: 25 U/L (ref 10–52)
ANION GAP SERPL CALC-SCNC: 15 MMOL/L (ref 10–20)
AST SERPL W P-5'-P-CCNC: 28 U/L (ref 9–39)
BILIRUB SERPL-MCNC: 0.4 MG/DL (ref 0–1.2)
BUN SERPL-MCNC: 22 MG/DL (ref 6–23)
CALCIUM SERPL-MCNC: 8.5 MG/DL (ref 8.6–10.3)
CHLORIDE SERPL-SCNC: 105 MMOL/L (ref 98–107)
CO2 SERPL-SCNC: 21 MMOL/L (ref 21–32)
CREAT SERPL-MCNC: 0.94 MG/DL (ref 0.5–1.3)
CRP SERPL-MCNC: 0.18 MG/DL
EGFRCR SERPLBLD CKD-EPI 2021: 82 ML/MIN/1.73M*2
ERYTHROCYTE [DISTWIDTH] IN BLOOD BY AUTOMATED COUNT: 17.6 % (ref 11.5–14.5)
ERYTHROCYTE [SEDIMENTATION RATE] IN BLOOD BY WESTERGREN METHOD: 9 MM/H (ref 0–20)
GLUCOSE SERPL-MCNC: 117 MG/DL (ref 74–99)
HCT VFR BLD AUTO: 31.8 % (ref 41–52)
HGB BLD-MCNC: 9.9 G/DL (ref 13.5–17.5)
MCH RBC QN AUTO: 30.4 PG (ref 26–34)
MCHC RBC AUTO-ENTMCNC: 31.1 G/DL (ref 32–36)
MCV RBC AUTO: 98 FL (ref 80–100)
NRBC BLD-RTO: 0 /100 WBCS (ref 0–0)
PLATELET # BLD AUTO: 147 X10*3/UL (ref 150–450)
POTASSIUM SERPL-SCNC: 4.1 MMOL/L (ref 3.5–5.3)
PROT SERPL-MCNC: 6.5 G/DL (ref 6.4–8.2)
RBC # BLD AUTO: 3.26 X10*6/UL (ref 4.5–5.9)
SODIUM SERPL-SCNC: 137 MMOL/L (ref 136–145)
WBC # BLD AUTO: 6.7 X10*3/UL (ref 4.4–11.3)

## 2024-08-20 PROCEDURE — 36415 COLL VENOUS BLD VENIPUNCTURE: CPT

## 2024-08-20 PROCEDURE — 80053 COMPREHEN METABOLIC PANEL: CPT

## 2024-08-20 PROCEDURE — 85027 COMPLETE CBC AUTOMATED: CPT

## 2024-08-20 PROCEDURE — 86140 C-REACTIVE PROTEIN: CPT

## 2024-08-20 PROCEDURE — 85652 RBC SED RATE AUTOMATED: CPT

## 2024-08-20 ASSESSMENT — LIFESTYLE VARIABLES
3_OR_MORE_DRINKS_PER_DAY: N
CURRENT_SMOKER: N

## 2024-08-26 ENCOUNTER — APPOINTMENT (OUTPATIENT)
Dept: RADIOLOGY | Facility: CLINIC | Age: 79
End: 2024-08-26
Payer: MEDICARE

## 2024-08-28 ENCOUNTER — APPOINTMENT (OUTPATIENT)
Dept: PRIMARY CARE | Facility: CLINIC | Age: 79
End: 2024-08-28
Payer: MEDICARE

## 2024-08-28 ENCOUNTER — OFFICE VISIT (OUTPATIENT)
Dept: PRIMARY CARE | Facility: CLINIC | Age: 79
End: 2024-08-28
Payer: MEDICARE

## 2024-08-28 VITALS
WEIGHT: 162.4 LBS | DIASTOLIC BLOOD PRESSURE: 66 MMHG | HEIGHT: 68 IN | SYSTOLIC BLOOD PRESSURE: 124 MMHG | TEMPERATURE: 98.2 F | BODY MASS INDEX: 24.61 KG/M2 | OXYGEN SATURATION: 98 % | HEART RATE: 105 BPM

## 2024-08-28 DIAGNOSIS — R73.03 PREDIABETES: Chronic | ICD-10-CM

## 2024-08-28 DIAGNOSIS — K21.9 GASTROESOPHAGEAL REFLUX DISEASE, UNSPECIFIED WHETHER ESOPHAGITIS PRESENT: Chronic | ICD-10-CM

## 2024-08-28 DIAGNOSIS — D63.8 ANEMIA, CHRONIC DISEASE: ICD-10-CM

## 2024-08-28 DIAGNOSIS — I10 PRIMARY HYPERTENSION: Primary | Chronic | ICD-10-CM

## 2024-08-28 DIAGNOSIS — E78.2 MIXED HYPERLIPIDEMIA: Chronic | ICD-10-CM

## 2024-08-28 PROCEDURE — 1036F TOBACCO NON-USER: CPT | Performed by: FAMILY MEDICINE

## 2024-08-28 PROCEDURE — 99214 OFFICE O/P EST MOD 30 MIN: CPT | Performed by: FAMILY MEDICINE

## 2024-08-28 PROCEDURE — 1160F RVW MEDS BY RX/DR IN RCRD: CPT | Performed by: FAMILY MEDICINE

## 2024-08-28 PROCEDURE — 1126F AMNT PAIN NOTED NONE PRSNT: CPT | Performed by: FAMILY MEDICINE

## 2024-08-28 PROCEDURE — G2211 COMPLEX E/M VISIT ADD ON: HCPCS | Performed by: FAMILY MEDICINE

## 2024-08-28 PROCEDURE — 1159F MED LIST DOCD IN RCRD: CPT | Performed by: FAMILY MEDICINE

## 2024-08-28 PROCEDURE — 3078F DIAST BP <80 MM HG: CPT | Performed by: FAMILY MEDICINE

## 2024-08-28 PROCEDURE — 1157F ADVNC CARE PLAN IN RCRD: CPT | Performed by: FAMILY MEDICINE

## 2024-08-28 PROCEDURE — 3074F SYST BP LT 130 MM HG: CPT | Performed by: FAMILY MEDICINE

## 2024-08-28 ASSESSMENT — PAIN SCALES - GENERAL: PAINLEVEL: 0-NO PAIN

## 2024-08-28 ASSESSMENT — PATIENT HEALTH QUESTIONNAIRE - PHQ9
2. FEELING DOWN, DEPRESSED OR HOPELESS: NOT AT ALL
SUM OF ALL RESPONSES TO PHQ9 QUESTIONS 1 AND 2: 0
1. LITTLE INTEREST OR PLEASURE IN DOING THINGS: NOT AT ALL

## 2024-08-28 ASSESSMENT — LIFESTYLE VARIABLES
AUDIT-C TOTAL SCORE: 0
HOW MANY STANDARD DRINKS CONTAINING ALCOHOL DO YOU HAVE ON A TYPICAL DAY: PATIENT DOES NOT DRINK
HOW OFTEN DO YOU HAVE SIX OR MORE DRINKS ON ONE OCCASION: NEVER
HOW OFTEN DO YOU HAVE A DRINK CONTAINING ALCOHOL: NEVER
SKIP TO QUESTIONS 9-10: 1

## 2024-08-28 ASSESSMENT — ENCOUNTER SYMPTOMS
SHORTNESS OF BREATH: 0
ABDOMINAL DISTENTION: 0
OCCASIONAL FEELINGS OF UNSTEADINESS: 0
LOSS OF SENSATION IN FEET: 0
COUGH: 0
DEPRESSION: 0
PALPITATIONS: 0

## 2024-08-28 NOTE — PROGRESS NOTES
"Subjective   Patient ID: Oren Rowland is a 79 y.o. male who presents for Follow-up.    HPI   Hypertension :   -Patient is here for follow-up hypertension: chronic , stable  -Tolerating medications without side effects  GERD: Stable and controlled with pantoprazole  Osteoporosis data Badgley-milligrams once a month without any side effects  Dyslipidemia  -Patient presents for follow up chronic ,stable problem  -Concerns: none   -Treatment:  diet    Review of Systems   Respiratory:  Negative for cough and shortness of breath.    Cardiovascular:  Negative for chest pain and palpitations.   Gastrointestinal:  Negative for abdominal distention.       Objective   /66   Pulse 105   Temp 36.8 °C (98.2 °F) (Temporal)   Ht 1.727 m (5' 8\")   Wt 73.7 kg (162 lb 6.4 oz)   SpO2 98%   BMI 24.69 kg/m²   L 119/66  Physical Exam  Vitals reviewed.   Constitutional:       Appearance: Normal appearance.   Cardiovascular:      Rate and Rhythm: Normal rate and regular rhythm.      Heart sounds: Normal heart sounds. No murmur heard.  Pulmonary:      Breath sounds: Normal breath sounds.   Abdominal:      General: Abdomen is flat. Bowel sounds are normal.      Palpations: Abdomen is soft.   Musculoskeletal:         General: No swelling.   Neurological:      Mental Status: He is alert.         Assessment/Plan   Diagnoses and all orders for this visit:  Primary hypertension  Mixed hyperlipidemia  Prediabetes  Gastroesophageal reflux disease, unspecified whether esophagitis present  Anemia, chronic disease  Other orders  -     3 Month Follow Up In Primary Care         "

## 2024-10-15 DIAGNOSIS — M13.80 SERONEGATIVE ARTHRITIS: ICD-10-CM

## 2024-10-15 RX ORDER — LEFLUNOMIDE 20 MG/1
20 TABLET ORAL DAILY
Qty: 30 TABLET | Refills: 3 | Status: SHIPPED | OUTPATIENT
Start: 2024-10-15

## 2024-11-15 ENCOUNTER — HOSPITAL ENCOUNTER (OUTPATIENT)
Dept: RADIOLOGY | Facility: CLINIC | Age: 79
Discharge: HOME | End: 2024-11-15
Payer: MEDICARE

## 2024-11-15 DIAGNOSIS — M81.0 OSTEOPOROSIS, UNSPECIFIED OSTEOPOROSIS TYPE, UNSPECIFIED PATHOLOGICAL FRACTURE PRESENCE: ICD-10-CM

## 2024-11-15 PROCEDURE — 77080 DXA BONE DENSITY AXIAL: CPT

## 2024-12-09 ENCOUNTER — APPOINTMENT (OUTPATIENT)
Dept: RHEUMATOLOGY | Facility: CLINIC | Age: 79
End: 2024-12-09
Payer: MEDICARE

## 2024-12-09 VITALS
BODY MASS INDEX: 25.2 KG/M2 | WEIGHT: 176 LBS | HEIGHT: 70 IN | DIASTOLIC BLOOD PRESSURE: 80 MMHG | OXYGEN SATURATION: 95 % | SYSTOLIC BLOOD PRESSURE: 132 MMHG | HEART RATE: 87 BPM

## 2024-12-09 DIAGNOSIS — M81.0 OSTEOPOROSIS, UNSPECIFIED OSTEOPOROSIS TYPE, UNSPECIFIED PATHOLOGICAL FRACTURE PRESENCE: ICD-10-CM

## 2024-12-09 DIAGNOSIS — Z79.899 ENCOUNTER FOR LONG-TERM (CURRENT) USE OF MEDICATIONS: ICD-10-CM

## 2024-12-09 DIAGNOSIS — M13.80 SERONEGATIVE ARTHRITIS: Primary | ICD-10-CM

## 2024-12-09 PROCEDURE — 3079F DIAST BP 80-89 MM HG: CPT | Performed by: INTERNAL MEDICINE

## 2024-12-09 PROCEDURE — 1159F MED LIST DOCD IN RCRD: CPT | Performed by: INTERNAL MEDICINE

## 2024-12-09 PROCEDURE — 99213 OFFICE O/P EST LOW 20 MIN: CPT | Performed by: INTERNAL MEDICINE

## 2024-12-09 PROCEDURE — 3075F SYST BP GE 130 - 139MM HG: CPT | Performed by: INTERNAL MEDICINE

## 2024-12-09 PROCEDURE — 1036F TOBACCO NON-USER: CPT | Performed by: INTERNAL MEDICINE

## 2024-12-09 PROCEDURE — 1123F ACP DISCUSS/DSCN MKR DOCD: CPT | Performed by: INTERNAL MEDICINE

## 2024-12-09 PROCEDURE — 1125F AMNT PAIN NOTED PAIN PRSNT: CPT | Performed by: INTERNAL MEDICINE

## 2024-12-09 PROCEDURE — 1157F ADVNC CARE PLAN IN RCRD: CPT | Performed by: INTERNAL MEDICINE

## 2024-12-09 PROCEDURE — G2211 COMPLEX E/M VISIT ADD ON: HCPCS | Performed by: INTERNAL MEDICINE

## 2024-12-09 RX ORDER — PREDNISONE 5 MG/1
5 TABLET ORAL DAILY
Qty: 90 TABLET | Refills: 3 | Status: SHIPPED | OUTPATIENT
Start: 2024-12-09 | End: 2025-12-09

## 2024-12-09 ASSESSMENT — ENCOUNTER SYMPTOMS
DEPRESSION: 0
OCCASIONAL FEELINGS OF UNSTEADINESS: 0
SHORTNESS OF BREATH: 1
LOSS OF SENSATION IN FEET: 1
BRUISES/BLEEDS EASILY: 1
FATIGUE: 1
SLEEP DISTURBANCE: 1

## 2024-12-09 ASSESSMENT — PAIN SCALES - GENERAL: PAINLEVEL_OUTOF10: 2

## 2024-12-09 NOTE — PROGRESS NOTES
"Subjective   Patient ID: Bret Rowland \"Oren\" is a 79 y.o. male who presents for No chief complaint on file..  HPI  Patient with history of seronegative rheumatoid arthritis, osteoarthritis, and osteoporosis previously seen by Dr. Avila and Dr. Louis.  Previous medications have included Plaquenil.  Ultrasound of liver does show fibrosis  DEXA 11/15/2024 left total hip -2, left femoral neck -2.4, lumbar spine 0.2  DEXA 5/17/2022 left total hip -1.7, left femoral neck -2.5, lumbar spine 0.9    Accompanied by his wife today    Patient was last seen in August and at that time we had him continue on leflunomide.  Does have fibrotic liver changes.  We also ordered a new bone density in which she had mild decrease in the left total hip but some mild improvement in the left femoral neck.  Numbers are still osteopenic.  I would have him continue with ibandronate.  In the morning he is stiff and it takes about 45 minutes to an hour to get him loosened up.  At night he does toss and turn because he gets uncomfortable on 1 side and has to move to the other.  He does feel that overall he is doing pretty okay.  He will take his 5 mg of prednisone in the morning and is able to do everything that he needs to do.  He does not do any shoveling or snow color out.    He might have a little bit in the shoulders, ankles and knees.    Denies any infections or falls.  He does have symptoms of neuropathy in his feet.  Usually is worse later on in the day.  Review of Systems   Constitutional:  Positive for fatigue.   Eyes:         Wears glasses   Respiratory:  Positive for shortness of breath.         Previous smoker quit 35 years ago   Cardiovascular:  Negative for chest pain.        Hypertension, had full cardiac workup for his shortness of breath on exertion with cardiology and was negative.   Gastrointestinal:         Had  h/o microscopic colitis.  Now controlling it with diet.  Previously had been on budesonide and colestipol. "   Musculoskeletal:         Per HPI   Neurological:         Neuropathy symptoms in the lower extremities   Hematological:  Bruises/bleeds easily.   Psychiatric/Behavioral:  Positive for sleep disturbance.        Objective   Physical Exam  GEN: NAD A&O x3 appropriate affect able to get onto the exam table  EYES:  no conjunctival redness, eyelids normal, wears glasses  SKIN: no rashes, ulcers, or subcutaneous nodules  PULSES: +radials  TENDER POINTS: 0/18   MSK:  Contracture Dupuytren's of the fifth digit on the right but no synovitis in DIP PIP MCP wrist elbows no tenderness in the proximal upper or lower extremities no swelling of the knees or ankles  Assessment/Plan        Seronegative rheumatoid arthritis versus PMR symptoms.  Uncertain of his diagnosis since he has been on medicines for quite some time.  Previous medications include azathioprine which exacerbated pancreatitis   -Currently on leflunomide 20 mg and 5 mg daily of prednisone.  Does still have some stiffness but no swelling on exam.    Osteoporosis   DEXA 11/15/2024 left total hip -2, left femoral neck -2.4, lumbar spine 0.2   -Left femoral neck -2.5.  Currently on ibandronate.  He was started on pantoprazole in the hospital   -He is due for bone density    Osteoarthritis        Will have him come back in 4 to 5 months or as needed

## 2024-12-10 ENCOUNTER — LAB (OUTPATIENT)
Dept: LAB | Facility: LAB | Age: 79
End: 2024-12-10
Payer: MEDICARE

## 2024-12-10 DIAGNOSIS — M81.0 OSTEOPOROSIS, UNSPECIFIED OSTEOPOROSIS TYPE, UNSPECIFIED PATHOLOGICAL FRACTURE PRESENCE: ICD-10-CM

## 2024-12-10 DIAGNOSIS — M13.80 SERONEGATIVE ARTHRITIS: ICD-10-CM

## 2024-12-10 DIAGNOSIS — Z79.899 ENCOUNTER FOR LONG-TERM (CURRENT) USE OF MEDICATIONS: ICD-10-CM

## 2024-12-10 LAB
ALBUMIN SERPL BCP-MCNC: 3.9 G/DL (ref 3.4–5)
ALP SERPL-CCNC: 71 U/L (ref 33–136)
ALT SERPL W P-5'-P-CCNC: 22 U/L (ref 10–52)
ANION GAP SERPL CALC-SCNC: 14 MMOL/L (ref 10–20)
AST SERPL W P-5'-P-CCNC: 26 U/L (ref 9–39)
BILIRUB SERPL-MCNC: 0.2 MG/DL (ref 0–1.2)
BUN SERPL-MCNC: 16 MG/DL (ref 6–23)
CALCIUM SERPL-MCNC: 8.3 MG/DL (ref 8.6–10.3)
CHLORIDE SERPL-SCNC: 109 MMOL/L (ref 98–107)
CO2 SERPL-SCNC: 22 MMOL/L (ref 21–32)
CREAT SERPL-MCNC: 0.98 MG/DL (ref 0.5–1.3)
CRP SERPL-MCNC: 0.17 MG/DL
EGFRCR SERPLBLD CKD-EPI 2021: 78 ML/MIN/1.73M*2
ERYTHROCYTE [DISTWIDTH] IN BLOOD BY AUTOMATED COUNT: 15.1 % (ref 11.5–14.5)
ERYTHROCYTE [SEDIMENTATION RATE] IN BLOOD BY WESTERGREN METHOD: 11 MM/H (ref 0–20)
GLUCOSE SERPL-MCNC: 104 MG/DL (ref 74–99)
HCT VFR BLD AUTO: 34.6 % (ref 41–52)
HGB BLD-MCNC: 10 G/DL (ref 13.5–17.5)
MCH RBC QN AUTO: 27 PG (ref 26–34)
MCHC RBC AUTO-ENTMCNC: 28.9 G/DL (ref 32–36)
MCV RBC AUTO: 93 FL (ref 80–100)
NRBC BLD-RTO: 0 /100 WBCS (ref 0–0)
PLATELET # BLD AUTO: 186 X10*3/UL (ref 150–450)
POTASSIUM SERPL-SCNC: 4.4 MMOL/L (ref 3.5–5.3)
PROT SERPL-MCNC: 6 G/DL (ref 6.4–8.2)
RBC # BLD AUTO: 3.71 X10*6/UL (ref 4.5–5.9)
SODIUM SERPL-SCNC: 141 MMOL/L (ref 136–145)
WBC # BLD AUTO: 6.6 X10*3/UL (ref 4.4–11.3)

## 2024-12-10 PROCEDURE — 85652 RBC SED RATE AUTOMATED: CPT

## 2024-12-10 PROCEDURE — 82306 VITAMIN D 25 HYDROXY: CPT

## 2024-12-10 PROCEDURE — 80053 COMPREHEN METABOLIC PANEL: CPT

## 2024-12-10 PROCEDURE — 85027 COMPLETE CBC AUTOMATED: CPT

## 2024-12-10 PROCEDURE — 86140 C-REACTIVE PROTEIN: CPT

## 2024-12-10 PROCEDURE — 36415 COLL VENOUS BLD VENIPUNCTURE: CPT

## 2024-12-11 LAB — 25(OH)D3 SERPL-MCNC: 49 NG/ML (ref 30–100)

## 2024-12-30 ENCOUNTER — TELEPHONE (OUTPATIENT)
Dept: PRIMARY CARE | Facility: CLINIC | Age: 79
End: 2024-12-30
Payer: MEDICARE

## 2024-12-30 NOTE — TELEPHONE ENCOUNTER
Patient is concerned about his lisinopril.  And the possibility that he needs to have his medication changed.  Would recommend an appointment in the office and for him to bring the letter with him to see me so can discuss what needs to be done

## 2025-01-15 DIAGNOSIS — K21.9 GASTROESOPHAGEAL REFLUX DISEASE, UNSPECIFIED WHETHER ESOPHAGITIS PRESENT: Chronic | ICD-10-CM

## 2025-01-15 RX ORDER — PANTOPRAZOLE SODIUM 40 MG/1
40 TABLET, DELAYED RELEASE ORAL
Qty: 60 TABLET | Refills: 5 | Status: SHIPPED | OUTPATIENT
Start: 2025-01-15

## 2025-02-10 ENCOUNTER — TELEPHONE (OUTPATIENT)
Dept: PRIMARY CARE | Facility: CLINIC | Age: 80
End: 2025-02-10
Payer: MEDICARE

## 2025-02-10 DIAGNOSIS — I10 PRIMARY HYPERTENSION: ICD-10-CM

## 2025-02-10 RX ORDER — LISINOPRIL 10 MG/1
10 TABLET ORAL DAILY
Qty: 90 TABLET | Refills: 0 | OUTPATIENT
Start: 2025-02-10

## 2025-02-10 RX ORDER — LISINOPRIL 10 MG/1
10 TABLET ORAL DAILY
Qty: 30 TABLET | Refills: 5 | Status: SHIPPED | OUTPATIENT
Start: 2025-02-10

## 2025-02-10 NOTE — TELEPHONE ENCOUNTER
Refill Request:    lisinopril 10 mg tablet Take 1 tablet (10 mg) by mouth once daily. Do not start taking this medication again until 7/6/24.     PHARMACY:  [facility] Phone: [phone]   Fax: [phone]        Last OV-08/28/24, follow up    Next OV-03/05/25, [facility] # [phone]

## 2025-02-12 ENCOUNTER — APPOINTMENT (OUTPATIENT)
Dept: PRIMARY CARE | Facility: CLINIC | Age: 80
End: 2025-02-12
Payer: MEDICARE

## 2025-02-24 DIAGNOSIS — M13.80 SERONEGATIVE ARTHRITIS: ICD-10-CM

## 2025-02-25 RX ORDER — LEFLUNOMIDE 20 MG/1
20 TABLET ORAL DAILY
Qty: 30 TABLET | Refills: 2 | Status: SHIPPED | OUTPATIENT
Start: 2025-02-25

## 2025-03-05 ENCOUNTER — APPOINTMENT (OUTPATIENT)
Dept: PRIMARY CARE | Facility: CLINIC | Age: 80
End: 2025-03-05
Payer: MEDICARE

## 2025-04-17 ENCOUNTER — TELEPHONE (OUTPATIENT)
Dept: RHEUMATOLOGY | Facility: CLINIC | Age: 80
End: 2025-04-17
Payer: MEDICARE

## 2025-04-17 NOTE — TELEPHONE ENCOUNTER
Called and left a message concerning his question.  Uncertain if he already had the tooth pulled or will be getting it pulled.  He has been on Boniva To prevent bone loss since he has been on chronic steroid.  Also has had a bone density most recently in November that shows osteopenia.  Told him to discontinue the Boniva if he is going to have the tooth pulled.  Told him to call us with any questions

## 2025-04-21 ENCOUNTER — APPOINTMENT (OUTPATIENT)
Dept: RHEUMATOLOGY | Facility: CLINIC | Age: 80
End: 2025-04-21
Payer: MEDICARE

## 2025-05-01 ENCOUNTER — APPOINTMENT (OUTPATIENT)
Dept: RHEUMATOLOGY | Facility: CLINIC | Age: 80
End: 2025-05-01
Payer: MEDICARE

## 2025-05-01 VITALS
WEIGHT: 180 LBS | DIASTOLIC BLOOD PRESSURE: 76 MMHG | SYSTOLIC BLOOD PRESSURE: 137 MMHG | HEART RATE: 88 BPM | BODY MASS INDEX: 26.06 KG/M2 | OXYGEN SATURATION: 98 %

## 2025-05-01 DIAGNOSIS — M81.0 OSTEOPOROSIS, UNSPECIFIED OSTEOPOROSIS TYPE, UNSPECIFIED PATHOLOGICAL FRACTURE PRESENCE: ICD-10-CM

## 2025-05-01 DIAGNOSIS — M13.80 SERONEGATIVE ARTHRITIS: Primary | ICD-10-CM

## 2025-05-01 DIAGNOSIS — Z79.899 ENCOUNTER FOR LONG-TERM (CURRENT) USE OF MEDICATIONS: ICD-10-CM

## 2025-05-01 PROCEDURE — 1123F ACP DISCUSS/DSCN MKR DOCD: CPT | Performed by: INTERNAL MEDICINE

## 2025-05-01 PROCEDURE — G2211 COMPLEX E/M VISIT ADD ON: HCPCS | Performed by: INTERNAL MEDICINE

## 2025-05-01 PROCEDURE — 1157F ADVNC CARE PLAN IN RCRD: CPT | Performed by: INTERNAL MEDICINE

## 2025-05-01 PROCEDURE — 1036F TOBACCO NON-USER: CPT | Performed by: INTERNAL MEDICINE

## 2025-05-01 PROCEDURE — 3075F SYST BP GE 130 - 139MM HG: CPT | Performed by: INTERNAL MEDICINE

## 2025-05-01 PROCEDURE — 99214 OFFICE O/P EST MOD 30 MIN: CPT | Performed by: INTERNAL MEDICINE

## 2025-05-01 PROCEDURE — 1159F MED LIST DOCD IN RCRD: CPT | Performed by: INTERNAL MEDICINE

## 2025-05-01 PROCEDURE — 3078F DIAST BP <80 MM HG: CPT | Performed by: INTERNAL MEDICINE

## 2025-05-01 RX ORDER — LEFLUNOMIDE 20 MG/1
20 TABLET ORAL DAILY
Qty: 90 TABLET | Refills: 3 | Status: SHIPPED | OUTPATIENT
Start: 2025-05-01 | End: 2026-05-01

## 2025-05-01 ASSESSMENT — ENCOUNTER SYMPTOMS
SLEEP DISTURBANCE: 1
DEPRESSION: 0
SHORTNESS OF BREATH: 1
BRUISES/BLEEDS EASILY: 1
FATIGUE: 1
LOSS OF SENSATION IN FEET: 0
OCCASIONAL FEELINGS OF UNSTEADINESS: 0

## 2025-05-01 NOTE — PROGRESS NOTES
"Subjective   Patient ID: Bret Rowland \"Oren\" is a 79 y.o. male who presents for Follow-up.  HPI  Patient with history of seronegative rheumatoid arthritis, osteoarthritis, and osteoporosis previously seen by Dr. Avila and Dr. Louis.  Previous medications have included Plaquenil.  Ultrasound of liver does show fibrosis  DEXA 11/15/2024 left total hip -2, left femoral neck -2.4, lumbar spine 0.2  DEXA 5/17/2022 left total hip -1.7, left femoral neck -2.5, lumbar spine 0.9    Accompanied by his wife today    Patient was last seen in December and at that time we had him continue on leflunomide 20 mg and 5 mg of prednisone.He also had an continue on ibandronate.    He had called last week about getting a tooth pulled.  I left a message for him to discontinue the ibandronate.  His dentist did do some blood work and they agreed to wait for 4 months before pulling out his teeth.  Is going to get a bridge.    Overall he feels that he has been doing okay and able to do all of his activities.  He has been remodeling the bathroom.  He did have a flare of his microscopic colitis.    Has not had any infections.  He feels that his joint symptoms are stable  Review of Systems   Constitutional:  Positive for fatigue.   Eyes:         Wears glasses   Respiratory:  Positive for shortness of breath.         Previous smoker quit 35 years ago   Cardiovascular:  Negative for chest pain.        Hypertension, had full cardiac workup for his shortness of breath on exertion with cardiology and was negative.   Gastrointestinal:         Had  h/o microscopic colitis.  Now controlling it with diet.  Previously had been on budesonide and colestipol.   Musculoskeletal:         Per HPI   Neurological:         Neuropathy symptoms in the lower extremities   Hematological:  Bruises/bleeds easily.   Psychiatric/Behavioral:  Positive for sleep disturbance.        Objective   Physical Exam  GEN: NAD A&O x3 appropriate affect able to get onto the exam " table  EYES:  no conjunctival redness, eyelids normal, wears glasses  SKIN: no rashes, ulcers, or subcutaneous nodules  PULSES: +radials  TENDER POINTS: 0/18   MSK:  Contracture Dupuytren's of the fifth digit on the right but no synovitis in DIP PIP MCP wrist elbows no tenderness in the proximal upper or lower extremities no swelling of the knees or ankles  Assessment/Plan        Seronegative rheumatoid arthritis versus PMR symptoms.  Uncertain of his diagnosis since he has been on medicines for quite some time.  Previous medications include azathioprine which exacerbated pancreatitis   -Currently on leflunomide 20 mg and 5 mg daily of prednisone.  No current tenderness on exam   -will get blood work to monitor drug toxicity and disease activity    Osteoporosis on chronic prednisone  DEXA 11/15/2024 left total hip -2, left femoral neck -2.4, lumbar spine 0.2   -Left femoral neck -2.5.  Currently on ibandronate.  He is holding it for planned tooth extractions.  Will be getting a partial      Osteoarthritis    Will see him again in 5 to 6 months or as needed

## 2025-05-07 LAB
ALBUMIN SERPL-MCNC: 4.2 G/DL (ref 3.6–5.1)
ALP SERPL-CCNC: 73 U/L (ref 35–144)
ALT SERPL-CCNC: 21 U/L (ref 9–46)
ANION GAP SERPL CALCULATED.4IONS-SCNC: 9 MMOL/L (CALC) (ref 7–17)
AST SERPL-CCNC: 25 U/L (ref 10–35)
BILIRUB SERPL-MCNC: 0.4 MG/DL (ref 0.2–1.2)
BUN SERPL-MCNC: 26 MG/DL (ref 7–25)
CALCIUM SERPL-MCNC: 8.7 MG/DL (ref 8.6–10.3)
CHLORIDE SERPL-SCNC: 109 MMOL/L (ref 98–110)
CO2 SERPL-SCNC: 20 MMOL/L (ref 20–32)
CREAT SERPL-MCNC: 1.6 MG/DL (ref 0.7–1.28)
CRP SERPL-MCNC: <3 MG/L
EGFRCR SERPLBLD CKD-EPI 2021: 44 ML/MIN/1.73M2
ERYTHROCYTE [DISTWIDTH] IN BLOOD BY AUTOMATED COUNT: 15.6 % (ref 11–15)
ERYTHROCYTE [SEDIMENTATION RATE] IN BLOOD BY WESTERGREN METHOD: 6 MM/H
GLUCOSE SERPL-MCNC: 105 MG/DL (ref 65–99)
HCT VFR BLD AUTO: 32.1 % (ref 38.5–50)
HGB BLD-MCNC: 9.7 G/DL (ref 13.2–17.1)
MCH RBC QN AUTO: 25.1 PG (ref 27–33)
MCHC RBC AUTO-ENTMCNC: 30.2 G/DL (ref 32–36)
MCV RBC AUTO: 83.2 FL (ref 80–100)
PLATELET # BLD AUTO: 193 THOUSAND/UL (ref 140–400)
PMV BLD REES-ECKER: 10 FL (ref 7.5–12.5)
POTASSIUM SERPL-SCNC: 4.5 MMOL/L (ref 3.5–5.3)
PROT SERPL-MCNC: 6.7 G/DL (ref 6.1–8.1)
RBC # BLD AUTO: 3.86 MILLION/UL (ref 4.2–5.8)
SODIUM SERPL-SCNC: 138 MMOL/L (ref 135–146)
WBC # BLD AUTO: 7.4 THOUSAND/UL (ref 3.8–10.8)

## 2025-06-10 ENCOUNTER — OFFICE VISIT (OUTPATIENT)
Dept: PRIMARY CARE | Facility: CLINIC | Age: 80
End: 2025-06-10
Payer: MEDICARE

## 2025-06-10 VITALS
OXYGEN SATURATION: 97 % | HEART RATE: 105 BPM | TEMPERATURE: 97.3 F | DIASTOLIC BLOOD PRESSURE: 70 MMHG | WEIGHT: 181 LBS | BODY MASS INDEX: 26.21 KG/M2 | SYSTOLIC BLOOD PRESSURE: 136 MMHG

## 2025-06-10 DIAGNOSIS — M13.80 SERONEGATIVE ARTHRITIS: Chronic | ICD-10-CM

## 2025-06-10 DIAGNOSIS — M06.052: ICD-10-CM

## 2025-06-10 DIAGNOSIS — M06.051: ICD-10-CM

## 2025-06-10 DIAGNOSIS — E78.2 MIXED HYPERLIPIDEMIA: Chronic | ICD-10-CM

## 2025-06-10 DIAGNOSIS — K21.9 GASTROESOPHAGEAL REFLUX DISEASE, UNSPECIFIED WHETHER ESOPHAGITIS PRESENT: Chronic | ICD-10-CM

## 2025-06-10 DIAGNOSIS — Z00.00 ROUTINE GENERAL MEDICAL EXAMINATION AT HEALTH CARE FACILITY: Primary | ICD-10-CM

## 2025-06-10 DIAGNOSIS — R73.03 PREDIABETES: Chronic | ICD-10-CM

## 2025-06-10 DIAGNOSIS — I10 PRIMARY HYPERTENSION: ICD-10-CM

## 2025-06-10 PROBLEM — M05.752: Status: ACTIVE | Noted: 2023-09-15

## 2025-06-10 PROBLEM — M05.751: Status: ACTIVE | Noted: 2023-09-15

## 2025-06-10 PROCEDURE — 99214 OFFICE O/P EST MOD 30 MIN: CPT | Performed by: FAMILY MEDICINE

## 2025-06-10 PROCEDURE — 99397 PER PM REEVAL EST PAT 65+ YR: CPT | Performed by: FAMILY MEDICINE

## 2025-06-10 PROCEDURE — 99215 OFFICE O/P EST HI 40 MIN: CPT | Mod: 25 | Performed by: FAMILY MEDICINE

## 2025-06-10 ASSESSMENT — ACTIVITIES OF DAILY LIVING (ADL)
TOILETING: INDEPENDENT
DRESSING: INDEPENDENT
PATIENT'S MEMORY ADEQUATE TO SAFELY COMPLETE DAILY ACTIVITIES?: YES
USING TRANSPORTATION: INDEPENDENT
GROOMING: INDEPENDENT
PREPARING MEALS: INDEPENDENT
JUDGMENT_ADEQUATE_SAFELY_COMPLETE_DAILY_ACTIVITIES: YES
DRESSING: INDEPENDENT
DOING_HOUSEWORK: INDEPENDENT
USING TELEPHONE: INDEPENDENT
FEEDING: INDEPENDENT
TOILETING: INDEPENDENT
BATHING: INDEPENDENT
TAKING MEDICATION: INDEPENDENT
EATING: INDEPENDENT
TAKING_MEDICATION: INDEPENDENT
MANAGING_FINANCES: INDEPENDENT
HEARING - LEFT EAR: FUNCTIONAL
PILL BOX USED: YES
ASSISTIVE_DEVICE: EYEGLASSES;DENTURES UPPER
ADEQUATE_TO_COMPLETE_ADL: YES
GROCERY_SHOPPING: INDEPENDENT
DRESSING YOURSELF: INDEPENDENT
HEARING - RIGHT EAR: FUNCTIONAL
NEEDS ASSISTANCE WITH FOOD: INDEPENDENT
JUDGMENT_ADEQUATE_SAFELY_COMPLETE_DAILY_ACTIVITIES: YES
FEEDING YOURSELF: INDEPENDENT
GROCERY SHOPPING: INDEPENDENT
BATHING: INDEPENDENT
BATHING: INDEPENDENT
MANAGING FINANCES: INDEPENDENT
DOING HOUSEWORK: INDEPENDENT
STIL DRIVING: YES
ADEQUATE_TO_COMPLETE_ADL: YES
WALKS IN HOME: INDEPENDENT

## 2025-06-10 ASSESSMENT — ENCOUNTER SYMPTOMS
ALLERGIC/IMMUNOLOGIC NEGATIVE: 1
OCCASIONAL FEELINGS OF UNSTEADINESS: 0
CONSTITUTIONAL NEGATIVE: 1
ENDOCRINE NEGATIVE: 1
LOSS OF SENSATION IN FEET: 0
EYES NEGATIVE: 1
MUSCULOSKELETAL NEGATIVE: 1
DEPRESSION: 0
PSYCHIATRIC NEGATIVE: 1
NEUROLOGICAL NEGATIVE: 1
GASTROINTESTINAL NEGATIVE: 1
RESPIRATORY NEGATIVE: 1

## 2025-06-10 ASSESSMENT — COLUMBIA-SUICIDE SEVERITY RATING SCALE - C-SSRS
1. IN THE PAST MONTH, HAVE YOU WISHED YOU WERE DEAD OR WISHED YOU COULD GO TO SLEEP AND NOT WAKE UP?: NO
2. HAVE YOU ACTUALLY HAD ANY THOUGHTS OF KILLING YOURSELF?: NO
6. HAVE YOU EVER DONE ANYTHING, STARTED TO DO ANYTHING, OR PREPARED TO DO ANYTHING TO END YOUR LIFE?: NO

## 2025-06-10 ASSESSMENT — PAIN SCALES - GENERAL: PAINLEVEL_OUTOF10: 0-NO PAIN

## 2025-06-10 NOTE — PROGRESS NOTES
Subjective   Reason for Visit: Bret Rowland is an 79 y.o. male here for a Medicare Wellness visit.          Reviewed all medications by prescribing practitioner or clinical pharmacist (such as prescriptions, OTCs, herbal therapies and supplements) and documented in the medical record.    HPI  Hyperlipidemia:          Patient here for F/U. stable, due for fasting lipid profile, currently trying to control with therapeutic lifestyle changes.   Hypertension:          Patient here for F/U. at goal, doing well and without complaints, tolerating meds with no side effects.   Diabetes Mellitus:          Insulin Resistance diagnosed with pre diabetes, follow fasting blood sugars periodically, counseled on weight loss and regular exercise.   GERD:          GERD F/U doing well and without complaints, controlled.   Inflammatory Polyarthritis:          c/o Morning stiffness on prednisone for serroneg ra managed by long at [facility]          c/o Joint pain.          Denies : Joint swelling.   Patient Care Team:  Justin Gallo MD as PCP - General (Family Medicine)  Justin Gallo MD as PCP - [employer] PCP  Tamiko Vera MD as Consulting Physician (Rheumatology)  Prashant Dean MD as Surgeon (Gastroenterology)     Review of Systems   Constitutional: Negative.    HENT: Negative.     Eyes: Negative.    Respiratory: Negative.     Gastrointestinal: Negative.    Endocrine: Negative.    Genitourinary: Negative.    Musculoskeletal: Negative.    Skin: Negative.    Allergic/Immunologic: Negative.    Neurological: Negative.    Psychiatric/Behavioral: Negative.         Objective   Vitals:  /64   Pulse 105   Temp 36.3 °C (97.3 °F) (Temporal)   Wt 82.1 kg (181 lb)   SpO2 97%   BMI 26.21 kg/m²       Physical Exam  Vitals reviewed.   Constitutional:       Appearance: Normal appearance.   HENT:      Right Ear: Tympanic membrane, ear canal and external ear normal.      Left Ear: Tympanic membrane,  ear canal and external ear normal.      Nose: Nose normal.      Mouth/Throat:      Mouth: Mucous membranes are moist.   Eyes:      Extraocular Movements: Extraocular movements intact.      Conjunctiva/sclera: Conjunctivae normal.      Pupils: Pupils are equal, round, and reactive to light.   Cardiovascular:      Rate and Rhythm: Normal rate and regular rhythm.      Pulses: Normal pulses.      Heart sounds: Normal heart sounds.   Pulmonary:      Breath sounds: Normal breath sounds.   Abdominal:      General: Abdomen is flat. Bowel sounds are normal.      Palpations: Abdomen is soft.   Musculoskeletal:         General: Normal range of motion.      Cervical back: Neck supple.   Skin:     General: Skin is warm.   Neurological:      General: No focal deficit present.      Mental Status: He is alert and oriented to person, place, and time.      Cranial Nerves: Cranial nerves 2-12 are intact.   Psychiatric:         Attention and Perception: Attention normal.         Mood and Affect: Mood normal.         Assessment & Plan  Routine general medical examination at health care facility    Orders:    1 Year Follow Up In Primary Care - Wellness Exam; Future    Mixed hyperlipidemia         Prediabetes    Orders:    Hemoglobin A1C; Future    Seronegative arthritis         Primary hypertension    Orders:    Urinalysis with Reflex Microscopic; Future    Comprehensive Metabolic Panel; Future    CBC and Auto Differential; Future    TSH with reflex to Free T4 if abnormal; Future    Lipid Panel; Future    Gastroesophageal reflux disease, unspecified whether esophagitis present         Rheumatoid arthritis involving both hips with negative rheumatoid factor (Multi)

## 2025-06-10 NOTE — ASSESSMENT & PLAN NOTE
Orders:    Urinalysis with Reflex Microscopic; Future    Comprehensive Metabolic Panel; Future    CBC and Auto Differential; Future    TSH with reflex to Free T4 if abnormal; Future    Lipid Panel; Future

## 2025-06-17 DIAGNOSIS — K59.1 FUNCTIONAL DIARRHEA: ICD-10-CM

## 2025-06-17 RX ORDER — COLESEVELAM 180 1/1
1250 TABLET ORAL 2 TIMES DAILY
Qty: 120 TABLET | Refills: 5 | Status: SHIPPED | OUTPATIENT
Start: 2025-06-17

## 2025-06-27 ENCOUNTER — HOSPITAL ENCOUNTER (OUTPATIENT)
Dept: RADIOLOGY | Facility: HOSPITAL | Age: 80
Discharge: HOME | End: 2025-06-27
Payer: MEDICARE

## 2025-06-27 DIAGNOSIS — K86.2 CYST OF PANCREAS (HHS-HCC): ICD-10-CM

## 2025-06-27 PROCEDURE — 2550000001 HC RX 255 CONTRASTS: Performed by: INTERNAL MEDICINE

## 2025-06-27 PROCEDURE — 74183 MRI ABD W/O CNTR FLWD CNTR: CPT

## 2025-06-27 PROCEDURE — A9575 INJ GADOTERATE MEGLUMI 0.1ML: HCPCS | Performed by: INTERNAL MEDICINE

## 2025-06-27 RX ORDER — GADOTERATE MEGLUMINE 376.9 MG/ML
20 INJECTION INTRAVENOUS
Status: COMPLETED | OUTPATIENT
Start: 2025-06-27 | End: 2025-06-27

## 2025-06-27 RX ADMIN — GADOTERATE MEGLUMINE 16 ML: 376.9 INJECTION INTRAVENOUS at 12:49

## 2025-07-11 LAB
ALBUMIN SERPL-MCNC: 4.1 G/DL (ref 3.6–5.1)
ALP SERPL-CCNC: 77 U/L (ref 35–144)
ALT SERPL-CCNC: 20 U/L (ref 9–46)
ANION GAP SERPL CALCULATED.4IONS-SCNC: 8 MMOL/L (CALC) (ref 7–17)
APPEARANCE UR: CLEAR
AST SERPL-CCNC: 32 U/L (ref 10–35)
BASOPHILS # BLD AUTO: 41 CELLS/UL (ref 0–200)
BASOPHILS NFR BLD AUTO: 0.7 %
BILIRUB SERPL-MCNC: 0.4 MG/DL (ref 0.2–1.2)
BILIRUB UR QL STRIP: NEGATIVE
BUN SERPL-MCNC: 19 MG/DL (ref 7–25)
CALCIUM SERPL-MCNC: 8.7 MG/DL (ref 8.6–10.3)
CHLORIDE SERPL-SCNC: 110 MMOL/L (ref 98–110)
CHOLEST SERPL-MCNC: 215 MG/DL
CHOLEST/HDLC SERPL: 2.3 (CALC)
CO2 SERPL-SCNC: 24 MMOL/L (ref 20–32)
COLOR UR: YELLOW
CREAT SERPL-MCNC: 1.17 MG/DL (ref 0.7–1.22)
EGFRCR SERPLBLD CKD-EPI 2021: 63 ML/MIN/1.73M2
EOSINOPHIL # BLD AUTO: 207 CELLS/UL (ref 15–500)
EOSINOPHIL NFR BLD AUTO: 3.5 %
ERYTHROCYTE [DISTWIDTH] IN BLOOD BY AUTOMATED COUNT: 17 % (ref 11–15)
EST. AVERAGE GLUCOSE BLD GHB EST-MCNC: 120 MG/DL
EST. AVERAGE GLUCOSE BLD GHB EST-SCNC: 6.6 MMOL/L
GLUCOSE SERPL-MCNC: 91 MG/DL (ref 65–99)
GLUCOSE UR QL STRIP: NEGATIVE
HBA1C MFR BLD: 5.8 %
HCT VFR BLD AUTO: 33.8 % (ref 38.5–50)
HDLC SERPL-MCNC: 92 MG/DL
HGB BLD-MCNC: 10 G/DL (ref 13.2–17.1)
HGB UR QL STRIP: NEGATIVE
KETONES UR QL STRIP: NEGATIVE
LDLC SERPL CALC-MCNC: 99 MG/DL (CALC)
LEUKOCYTE ESTERASE UR QL STRIP: NEGATIVE
LYMPHOCYTES # BLD AUTO: 2012 CELLS/UL (ref 850–3900)
LYMPHOCYTES NFR BLD AUTO: 34.1 %
MCH RBC QN AUTO: 24.7 PG (ref 27–33)
MCHC RBC AUTO-ENTMCNC: 29.6 G/DL (ref 32–36)
MCV RBC AUTO: 83.5 FL (ref 80–100)
MONOCYTES # BLD AUTO: 549 CELLS/UL (ref 200–950)
MONOCYTES NFR BLD AUTO: 9.3 %
NEUTROPHILS # BLD AUTO: 3092 CELLS/UL (ref 1500–7800)
NEUTROPHILS NFR BLD AUTO: 52.4 %
NITRITE UR QL STRIP: NEGATIVE
NONHDLC SERPL-MCNC: 123 MG/DL (CALC)
PH UR STRIP: 5.5 [PH] (ref 5–8)
PLATELET # BLD AUTO: 190 THOUSAND/UL (ref 140–400)
PMV BLD REES-ECKER: 9.7 FL (ref 7.5–12.5)
POTASSIUM SERPL-SCNC: 4.1 MMOL/L (ref 3.5–5.3)
PROT SERPL-MCNC: 6.5 G/DL (ref 6.1–8.1)
PROT UR QL STRIP: NEGATIVE
RBC # BLD AUTO: 4.05 MILLION/UL (ref 4.2–5.8)
SODIUM SERPL-SCNC: 142 MMOL/L (ref 135–146)
SP GR UR STRIP: 1.02 (ref 1–1.03)
TRIGL SERPL-MCNC: 142 MG/DL
TSH SERPL-ACNC: 0.58 MIU/L (ref 0.4–4.5)
WBC # BLD AUTO: 5.9 THOUSAND/UL (ref 3.8–10.8)

## 2025-07-15 ENCOUNTER — HOSPITAL ENCOUNTER (OUTPATIENT)
Dept: RADIOLOGY | Facility: HOSPITAL | Age: 80
Discharge: HOME | End: 2025-07-15
Payer: MEDICARE

## 2025-07-15 ENCOUNTER — APPOINTMENT (OUTPATIENT)
Dept: RADIOLOGY | Facility: HOSPITAL | Age: 80
End: 2025-07-15
Payer: MEDICARE

## 2025-07-15 DIAGNOSIS — K86.2 CYST OF PANCREAS (HHS-HCC): ICD-10-CM

## 2025-07-15 PROCEDURE — 76981 USE PARENCHYMA: CPT | Performed by: RADIOLOGY

## 2025-07-15 PROCEDURE — 76705 ECHO EXAM OF ABDOMEN: CPT

## 2025-07-15 PROCEDURE — 76981 USE PARENCHYMA: CPT

## 2025-07-15 PROCEDURE — 76705 ECHO EXAM OF ABDOMEN: CPT | Performed by: RADIOLOGY

## 2025-07-29 ENCOUNTER — APPOINTMENT (OUTPATIENT)
Dept: RADIOLOGY | Facility: HOSPITAL | Age: 80
End: 2025-07-29
Payer: MEDICARE

## 2025-07-29 ENCOUNTER — HOSPITAL ENCOUNTER (EMERGENCY)
Facility: HOSPITAL | Age: 80
Discharge: HOME | End: 2025-07-29
Payer: MEDICARE

## 2025-07-29 VITALS
HEART RATE: 84 BPM | HEIGHT: 69 IN | RESPIRATION RATE: 16 BRPM | BODY MASS INDEX: 26.66 KG/M2 | SYSTOLIC BLOOD PRESSURE: 114 MMHG | TEMPERATURE: 97.9 F | DIASTOLIC BLOOD PRESSURE: 81 MMHG | OXYGEN SATURATION: 95 % | WEIGHT: 180 LBS

## 2025-07-29 DIAGNOSIS — S22.31XA CLOSED FRACTURE OF ONE RIB OF RIGHT SIDE, INITIAL ENCOUNTER: ICD-10-CM
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
## 2025-07-29 DIAGNOSIS — S09.90XA CLOSED HEAD INJURY, INITIAL ENCOUNTER: ICD-10-CM

## 2025-07-29 DIAGNOSIS — S63.502A FOREARM SPRAIN, LEFT, INITIAL ENCOUNTER: ICD-10-CM

## 2025-07-29 DIAGNOSIS — R07.81 RIB PAIN ON RIGHT SIDE: ICD-10-CM

## 2025-07-29 DIAGNOSIS — S62.102A CLOSED FRACTURE OF LEFT WRIST, INITIAL ENCOUNTER: ICD-10-CM

## 2025-07-29 PROCEDURE — 71101 X-RAY EXAM UNILAT RIBS/CHEST: CPT | Mod: RIGHT SIDE | Performed by: RADIOLOGY

## 2025-07-29 PROCEDURE — 73110 X-RAY EXAM OF WRIST: CPT | Mod: LT

## 2025-07-29 PROCEDURE — 71250 CT THORAX DX C-: CPT | Performed by: RADIOLOGY

## 2025-07-29 PROCEDURE — 2500000001 HC RX 250 WO HCPCS SELF ADMINISTERED DRUGS (ALT 637 FOR MEDICARE OP): Performed by: NURSE PRACTITIONER

## 2025-07-29 PROCEDURE — 71250 CT THORAX DX C-: CPT

## 2025-07-29 PROCEDURE — 73090 X-RAY EXAM OF FOREARM: CPT | Mod: LT

## 2025-07-29 PROCEDURE — 71101 X-RAY EXAM UNILAT RIBS/CHEST: CPT | Mod: RT

## 2025-07-29 PROCEDURE — 73110 X-RAY EXAM OF WRIST: CPT | Mod: LEFT SIDE | Performed by: RADIOLOGY

## 2025-07-29 PROCEDURE — 73090 X-RAY EXAM OF FOREARM: CPT | Mod: LEFT SIDE | Performed by: RADIOLOGY

## 2025-07-29 PROCEDURE — 72125 CT NECK SPINE W/O DYE: CPT

## 2025-07-29 PROCEDURE — 70450 CT HEAD/BRAIN W/O DYE: CPT

## 2025-07-29 PROCEDURE — 70450 CT HEAD/BRAIN W/O DYE: CPT | Performed by: RADIOLOGY

## 2025-07-29 PROCEDURE — 72125 CT NECK SPINE W/O DYE: CPT | Performed by: RADIOLOGY

## 2025-07-29 PROCEDURE — 99284 EMERGENCY DEPT VISIT MOD MDM: CPT | Mod: 25

## 2025-07-29 RX ORDER — LIDOCAINE 40 MG/G
CREAM TOPICAL 4 TIMES DAILY PRN
Qty: 133 G | Refills: 1 | Status: SHIPPED | OUTPATIENT
Start: 2025-07-29 | End: 2026-07-29

## 2025-07-29 RX ORDER — LIDOCAINE 560 MG/1
1 PATCH PERCUTANEOUS; TOPICAL; TRANSDERMAL ONCE
Status: DISCONTINUED | OUTPATIENT
Start: 2025-07-29 | End: 2025-07-30 | Stop reason: HOSPADM

## 2025-07-29 RX ORDER — OXYCODONE HYDROCHLORIDE 5 MG/1
5 TABLET ORAL EVERY 6 HOURS PRN
Qty: 12 TABLET | Refills: 0 | Status: SHIPPED | OUTPATIENT
Start: 2025-07-29 | End: 2025-08-01

## 2025-07-29 RX ORDER — OXYCODONE HYDROCHLORIDE 5 MG/1
5 TABLET ORAL ONCE
Refills: 0 | Status: COMPLETED | OUTPATIENT
Start: 2025-07-29 | End: 2025-07-29

## 2025-07-29 RX ADMIN — OXYCODONE 5 MG: 5 TABLET ORAL at 20:00

## 2025-07-29 ASSESSMENT — PAIN DESCRIPTION - ORIENTATION: ORIENTATION: LEFT

## 2025-07-29 ASSESSMENT — LIFESTYLE VARIABLES
EVER FELT BAD OR GUILTY ABOUT YOUR DRINKING: NO
HAVE YOU EVER FELT YOU SHOULD CUT DOWN ON YOUR DRINKING: NO
TOTAL SCORE: 0
EVER HAD A DRINK FIRST THING IN THE MORNING TO STEADY YOUR NERVES TO GET RID OF A HANGOVER: NO
HAVE PEOPLE ANNOYED YOU BY CRITICIZING YOUR DRINKING: NO

## 2025-07-29 ASSESSMENT — PAIN DESCRIPTION - PAIN TYPE: TYPE: ACUTE PAIN

## 2025-07-29 ASSESSMENT — PAIN SCALES - GENERAL: PAINLEVEL_OUTOF10: 5 - MODERATE PAIN

## 2025-07-29 ASSESSMENT — PAIN - FUNCTIONAL ASSESSMENT: PAIN_FUNCTIONAL_ASSESSMENT: 0-10

## 2025-07-29 ASSESSMENT — PAIN DESCRIPTION - LOCATION: LOCATION: WRIST

## 2025-07-29 NOTE — ED TRIAGE NOTES
Patient had a fall when he stood up out of his chair. Patient denies LOC states legs became weak and fell from a standing position. Patient has pain in left lower forearm/wrist. Noticeable swelling at site. Denies hitting head, not on blood thinners

## 2025-07-30 NOTE — ED PROVIDER NOTES
[facility]  ED  Encounter Note  Admit Date/RoomTime: 2025  7:10 PM  ED Room: [facility]  NAME: Bret Rowland  : 1945  MRN: [id number]     Chief Complaint:  Fall and Wrist Injury    HISTORY OF PRESENT ILLNESS        Bret Rowland is a 80 y.o. male who presents to the ED for evaluation of fall that occurred shortly before coming in. He stated that he stood out of a chair and just fell.     Patient just tripped. Landed on left forearm, wrist and also right ribs.  No blood thinners. No LOC. No numbness weakness.  He has pain to       right rib area as well as left wrist+2 pulses.     ROS   Pertinent positives and negatives are stated within HPI, all other systems reviewed and are negative.    Past Medical History:  has a past medical history of Anemia (), Arthritis (), Callus (), Chronic diarrhea (), Fracture of hand (2025), GERD (gastroesophageal reflux disease), Headache (2024), Hyperlipidemia, Hypertension (), Inflammatory bowel disease (), Irritable bowel syndrome (), Osteoarthritis (), Osteoporosis, Personal history of other diseases of the digestive system, Personal history of other diseases of the musculoskeletal system and connective tissue, Plantar fasciitis (), Rheumatoid arthritis, and Trigger finger ().    Surgical History:  has a past surgical history that includes Other surgical history (10/07/2019); Septoplasty (); Sinus surgery (2024); [place] tooth extraction (2024); Dupuytren contracture release (); and Trigger finger release ().    Social History:  reports that he quit smoking about 40 years ago. His smoking use included cigarettes. He started smoking about 50 years ago. He has a 11 pack-year smoking history. He has never used smokeless tobacco. He reports current alcohol use of about 18.0 standard drinks of alcohol per week. He reports that he does not use drugs.    Family History:  family history includes Arthritis in his mother; Cancer in his father; No Known Problems in his brother and sister; Rheumatologic disease (age of onset: 60 - 69) in his mother; abestosis in his father.     Allergies: Amoxicillin-pot clavulanate, Erythromycin, Hydroxychloroquine, Levofloxacin, Nsaids (non-steroidal anti-inflammatory drug), and Sulfa (sulfonamide antibiotics)    PHYSICAL EXAM   Oxygen Saturation Interpretation: Normal.         Physical Exam  Constitutional/General: Alert and oriented x3, well appearing, non toxic  HEENT:  NC/NT. PERRLA.  Airway patent.  Neck: Supple, full ROM. No midline vertebral tenderness or crepitus.   Respiratory: Lung sounds clear to auscultation bilaterally. No wheezes, rhonchi or stridor. Not in respiratory distress.  CV:  Regular rate. Regular rhythm. No murmurs or rubs. 2+ distal pulses.  GI:  Abdomen soft, non-tender, non-distended. +BS. No rebound, guarding, or rigidity. No pulsatile masses.  Musculoskeletal: Moves all extremities x 4. Warm and well perfused. Capillary refill <3 seconds  Integument: Skin warm and dry. No rashes.   Neurologic: Alert and oriented with no focal deficits, symmetric strength 5/5 in the upper and lower extremities bilaterally.  Psychiatric: Normal affect.    Lab / Imaging Results   (All laboratory and radiology results have been personally reviewed by myself)  Labs:    Imaging:  All Radiology results interpreted by Radiologist unless otherwise noted.  CT chest wo IV contrast   Final Result   Emphysema as well as mild wall thickening of the bronchi which could   be infectious or inflammatory.        Sequela of remote granulomatous disease, small hiatal hernia and   trace pericardial effusion.             MACRO:   None.        Signed by: Frances Vargas 7/29/2025 11:09 PM   Dictation workstation:   [id number]      XR forearm left 2 views   Final Result   Mildly displaced obliquely oriented left distal ulnar fracture             MACRO:   None         Signed by: Yared Mei 7/29/2025 9:28 PM   Dictation workstation:   [id number]      XR ribs right 2 views w chest pa or ap   Final Result   Findings suspicious for distal right 8th rib fracture. No   pneumothorax. Hyperinflated lungs. Emphysema.             MACRO:   None        Signed by: Yared Mei 7/29/2025 9:33 PM   Dictation workstation:   [id number]      XR wrist left 3+ views   Final Result   Mildly displaced obliquely oriented left distal ulnar fracture             MACRO:   None        Signed by: Yared Mei 7/29/2025 9:28 PM   Dictation workstation:   [id number]      CT head wo IV contrast   Final Result   CT HEAD:   No acute intracranial abnormality or calvarial fracture.   Global volume loss. Right frontal encephalomalacia. Persistent   erosive change noted along the anterior skull base. Query prior   infection. This appears similar to prior examination suggests that   this is a more chronic process. If persistent concern or this is a   finding that has not been previously evaluated, consider MRI with   contrast        CT CERVICAL SPINE:   No acute fracture or traumatic malalignment of the cervical spine.   Mild degenerative changes        Signed by: Yared Mei 7/29/2025 9:27 PM   Dictation workstation:   [id number]      CT cervical spine wo IV contrast   Final Result   CT HEAD:   No acute intracranial abnormality or calvarial fracture.   Global volume loss. Right frontal encephalomalacia. Persistent   erosive change noted along the anterior skull base. Query prior   infection. This appears similar to prior examination suggests that   this is a more chronic process. If persistent concern or this is a   finding that has not been previously evaluated, consider MRI with   contrast        CT CERVICAL SPINE:   No acute fracture or traumatic malalignment of the cervical spine.   Mild degenerative changes        Signed by: Yared Mei 7/29/2025 9:27 PM   Dictation workstation:   [id number]           ED Course / Medical Decision Making     Medications   oxyCODONE (Roxicodone) immediate release tablet 5 mg (5 mg oral Given 7/29/25 2000)     Diagnoses as of 07/30/25 2059   Fall, initial encounter   Closed head injury, initial encounter   Forearm sprain, left, initial encounter   Rib pain on right side   Closed fracture of one rib of right side, initial encounter   Closed fracture of left wrist, initial encounter     Re-examination:    Patient’s condition stable       Procedure(s):   Personally supervised placement of left arm splint by technician, splint approximately applied by technician neuro intact +2 pulses     MDM:       Ct head and neck wnl. Patient does have ddd in neck but stable. CXR showed + 8th rib fracture ct scan chest without contrast stable no additional rib fractures. Discussed keeping active and keep breathing and using a pillow to splint ribs. Patient with pretty high pain, has taken Percocet before for pain will give Roxicodone use 1/2 to tablet day and tylenol as needed. Try to avoid pain med as can cause confusion falls constipation. Elevate ice arm and see ortho of choosing in followup. Return if needed or new s/s. See PCP in followup Patient ambulated in ED without any issues or concerns  Ddx: fall   Plan of Care/Counseling:  I reviewed today's visit with the patient and wife  in addition to providing specific details for the plan of care and counseling regarding the diagnosis and prognosis.  Questions are answered at this time and are agreeable with the plan.    ASSESSMENT     1. Fall, initial encounter    2. Closed head injury, initial encounter    3. Forearm sprain, left, initial encounter    4. Rib pain on right side    5. Closed fracture of one rib of right side, initial encounter    6. Closed fracture of left wrist, initial encounter      PLAN   Home Referral Orthopedics, Advised to return for signs of head injury, weakness, numbness or tingling to extremities, incontinence,  and Advised to return for worsening or additional problems such as abdominal or chest pain  Diagnostic tests were reviewed and questions answered. Diagnosis, care plan and treatment options were discussed. The patient and spouse/SO understand instructions and will follow up as directed.  Condition stable  The patient and spouse was given verbal follow-up instructions  Patient condition is stable    New Medications     New Medications Ordered This Visit   Medications    oxyCODONE (Roxicodone) immediate release tablet 5 mg     Refill:  0     If ordered PRN for pain, nurse is permitted to administer this medication for higher pain scores based on patient preference?:   Yes    lidocaine (LMX) 4 % cream     Sig: Apply topically 4 times a day as needed for mild pain (1 - 3). WHATEVER ON FORMULATORY AND COVERED BY INSURANCE     Dispense:  133 g     Refill:  1    oxyCODONE (Roxicodone) 5 mg immediate release tablet     Sig: Take 1 tablet (5 mg) by mouth every 6 hours if needed for severe pain (7 - 10) for up to 3 days.     Dispense:  12 tablet     Refill:  0     Electronically signed by JAIR Ludwig     **This report was transcribed using voice recognition software. Every effort was made to ensure accuracy; however, inadvertent computerized transcription errors may be present.  END OF ED PROVIDER NOTE     JAIR Ludwig  07/30/25 [id number]

## 2025-07-30 NOTE — DISCHARGE INSTRUCTIONS
ELEVATE ICE  WEAR THE SPLINT  USE TYLENOL FOR A FEW DAYS FOR COMFORT  SEE ORTHO IN FOLLOWUP- LISTED ARE TWO HAND DOCTORS = SELECT ONE OF THEM TO FOLLOWUP    LIDODERM FOR THE RIB FRACTURES  Also small amount of pain meds (careful can make you fall confused or have constipation). Can use 1/2 tablet with tylenol or just tylenol alone

## 2025-07-31 ENCOUNTER — APPOINTMENT (OUTPATIENT)
Dept: GASTROENTEROLOGY | Facility: HOSPITAL | Age: 80
End: 2025-07-31
Payer: MEDICARE

## 2025-07-31 ENCOUNTER — EVALUATION (OUTPATIENT)
Dept: OCCUPATIONAL THERAPY | Facility: CLINIC | Age: 80
End: 2025-07-31
Payer: MEDICARE

## 2025-07-31 ENCOUNTER — OFFICE VISIT (OUTPATIENT)
Dept: ORTHOPEDIC SURGERY | Facility: CLINIC | Age: 80
End: 2025-07-31
Payer: MEDICARE

## 2025-07-31 DIAGNOSIS — M79.632 PAIN IN LEFT FOREARM: Primary | ICD-10-CM

## 2025-07-31 DIAGNOSIS — S52.242A CLOSED DISPLACED SPIRAL FRACTURE OF SHAFT OF LEFT ULNA, INITIAL ENCOUNTER: ICD-10-CM

## 2025-07-31 DIAGNOSIS — S69.92XD LEFT WRIST INJURY, SUBSEQUENT ENCOUNTER: Primary | ICD-10-CM

## 2025-07-31 PROBLEM — R41.3 MEMORY LOSS: Status: ACTIVE | Noted: 2025-07-31

## 2025-07-31 PROCEDURE — 99203 OFFICE O/P NEW LOW 30 MIN: CPT

## 2025-07-31 PROCEDURE — 1159F MED LIST DOCD IN RCRD: CPT

## 2025-07-31 PROCEDURE — L3702 EO W/O JOINTS CF: HCPCS

## 2025-07-31 PROCEDURE — 1160F RVW MEDS BY RX/DR IN RCRD: CPT

## 2025-07-31 ASSESSMENT — PAIN SCALES - GENERAL: PAINLEVEL_OUTOF10: 10 - WORST POSSIBLE PAIN

## 2025-07-31 ASSESSMENT — PAIN - FUNCTIONAL ASSESSMENT: PAIN_FUNCTIONAL_ASSESSMENT: 0-10

## 2025-07-31 NOTE — PROGRESS NOTES
HPI  Bret Rowland is a 80 y.o. male, accompanied by his wife,  in office today for   Chief Complaint   Patient presents with    Left Wrist - Injury   .  he had a fall 2 days ago onto his left arm.  He went to the ED at the time, diagnosed with ulna shaft fracture, splinted and sling, prescribed oxycodone for pain.  Continues to have pain ulna side of forearm.  He has been taking Tylenol as the oxycodone makes him drowsy.  This is his dominant hand    Past Medical History: RA, HTN    Medication  Medications Ordered Prior to Encounter[1]    Physical Exam  Constitutional: well developed, well nourished male in no acute distress  Psychiatric: normal mood, appropriate affect  Eyes: sclera anicteric  HENT: normocephalic/atraumatic  CV: regular rate and rhythm   Respiratory: non labored breathing  Integumentary: no rash  Neurological: moves all extremities    Left Hand Exam     Tenderness   The patient is experiencing tenderness in the ulnar area.     Range of Motion   Wrist   Left wrist extension: not assessed given fracture.     Other   Erythema: absent  Scars: absent  Sensation: normal              Imaging/Lab:  X-rays were taken 7/29/25 which were reviewed by myself and read by radiology and show Demineralization limits sensitivity. There is an obliquely oriented left distal ulna fracture which is mildly displaced. No other fractures identified. Mild degenerative changes of the carpus.      Assessment  Assessment: left ulna shaft fracture    Plan  Plan:  History, physical exam, and imaging were reviewed with patient. Discussed thermoplast splint versus hard cast.  Though wife is skeptical, states he would prefer the splint.  Orders entered for OT and sent directly to OT for splint placement. Splint to be worn full time, only removing for hygiene.  HE should be NWB with the left arm.  RICE and pain medication as needed.  Education: Discussed goal of no rotation of the forearm.  Follow Up: 3 weeks with new imaging of  forearm    All questions were answered for the patient prior to end of exam and patient addressed their understanding.    Beba Moore PA-C  07/31/25         [1]   Current Outpatient Medications on File Prior to Visit   Medication Sig Dispense Refill    cholecalciferol (Vitamin D-3) 25 MCG (1000 UT) capsule Take 1 capsule (25 mcg) by mouth once daily.      colesevelam (Welchol) 625 mg tablet Take 2 tablets by mouth twice daily 120 tablet 5    ibandronate (Boniva) 150 mg tablet Take 1 tablet (150 mg) by mouth every 30 (thirty) days. 3 tablet 3    leflunomide (Arava) 20 mg tablet Take 1 tablet (20 mg) by mouth once daily. 90 tablet 3    lidocaine (LMX) 4 % cream Apply topically 4 times a day as needed for mild pain (1 - 3). WHATEVER ON FORMULATORY AND COVERED BY INSURANCE 133 g 1    lisinopril 10 mg tablet Take 1 tablet (10 mg) by mouth once daily. Do not start taking this medication again until 7/6/24. 30 tablet 5    oxyCODONE (Roxicodone) 5 mg immediate release tablet Take 1 tablet (5 mg) by mouth every 6 hours if needed for severe pain (7 - 10) for up to 3 days. 12 tablet 0    pantoprazole (ProtoNix) 40 mg EC tablet TAKE 1 TABLET BY MOUTH ONCE DAILY IN THE MORNING BEFORE A MEAL 60 tablet 5    predniSONE (Deltasone) 5 mg tablet Take 1 tablet (5 mg) by mouth once daily. 90 tablet 3     No current facility-administered medications on file prior to visit.

## 2025-07-31 NOTE — PROGRESS NOTES
"Patient Name: Bret Rowland \"Oren\"  MRN: [id number]  7/31/2025 7/31/2025  Time Calculation  Start Time: 1102  Stop Time: 1132  Time Calculation (min): 30 min      L 3702          1. Pain in left forearm              DATE OF INJURY:  7/29  SUBJECTIVE: ... Pain 8/10    OBJECTIVE and TREATMENT  Patient instructed and completed the following:    Edema: instructed Edema control hand elevation and perform active finger and wrist ROM x10 every hour awake, and use pillows to keep hand above heart.  ROM:    Left     is:    Limited per protocol         Open and close fingers, x10  Opposition to each fingers x10  AROM at elbow x10    Custom orthosis fabricated elbow forearm and wrist splint elbow at 90 deg, neutral.    Patient to wear at all times except with HEP  .  Handouts issued, see scanned exercises for home.    ASSESSMENT  Patient in need of skilled therapy services for orthosis fabrication to prevent further injury, avoid further surgery, and regain pain free independence with ADL and IADL skills, work and leisure.     PLAN  No further visits planned at this time.    Patient may need follow up visits in the future due to wound and bone healing.      Breille Hay, OTR/L, [id number]        "

## 2025-08-11 ENCOUNTER — HOSPITAL ENCOUNTER (OUTPATIENT)
Dept: RADIOLOGY | Facility: HOSPITAL | Age: 80
Discharge: HOME | End: 2025-08-11
Payer: MEDICARE

## 2025-08-11 ENCOUNTER — DOCUMENTATION (OUTPATIENT)
Dept: OCCUPATIONAL THERAPY | Facility: HOSPITAL | Age: 80
End: 2025-08-11

## 2025-08-11 ENCOUNTER — APPOINTMENT (OUTPATIENT)
Dept: ORTHOPEDIC SURGERY | Facility: CLINIC | Age: 80
End: 2025-08-11
Payer: MEDICARE

## 2025-08-11 DIAGNOSIS — S52.242A CLOSED DISPLACED SPIRAL FRACTURE OF SHAFT OF LEFT ULNA, INITIAL ENCOUNTER: ICD-10-CM

## 2025-08-11 DIAGNOSIS — S52.242A CLOSED DISPLACED SPIRAL FRACTURE OF SHAFT OF LEFT ULNA, INITIAL ENCOUNTER: Primary | ICD-10-CM

## 2025-08-11 PROCEDURE — 73090 X-RAY EXAM OF FOREARM: CPT | Mod: LT

## 2025-08-11 PROCEDURE — 1159F MED LIST DOCD IN RCRD: CPT | Performed by: ORTHOPAEDIC SURGERY

## 2025-08-11 PROCEDURE — 99213 OFFICE O/P EST LOW 20 MIN: CPT | Performed by: ORTHOPAEDIC SURGERY

## 2025-08-11 PROCEDURE — 73090 X-RAY EXAM OF FOREARM: CPT | Mod: LEFT SIDE | Performed by: RADIOLOGY

## 2025-08-11 ASSESSMENT — PAIN - FUNCTIONAL ASSESSMENT: PAIN_FUNCTIONAL_ASSESSMENT: NO/DENIES PAIN

## 2025-08-22 DIAGNOSIS — K59.1 FUNCTIONAL DIARRHEA: ICD-10-CM

## 2025-08-22 RX ORDER — COLESEVELAM 180 1/1
1250 TABLET ORAL 2 TIMES DAILY
Qty: 360 TABLET | Refills: 3 | Status: SHIPPED | OUTPATIENT
Start: 2025-08-22

## 2025-08-25 DIAGNOSIS — I10 PRIMARY HYPERTENSION: ICD-10-CM

## 2025-08-26 RX ORDER — LISINOPRIL 10 MG/1
10 TABLET ORAL DAILY
Qty: 90 TABLET | Refills: 1 | Status: SHIPPED | OUTPATIENT
Start: 2025-08-26

## 2025-08-28 ENCOUNTER — APPOINTMENT (OUTPATIENT)
Dept: ORTHOPEDIC SURGERY | Facility: CLINIC | Age: 80
End: 2025-08-28
Payer: MEDICARE

## 2025-09-08 ENCOUNTER — APPOINTMENT (OUTPATIENT)
Dept: ORTHOPEDIC SURGERY | Facility: CLINIC | Age: 80
End: 2025-09-08
Payer: MEDICARE

## 2025-09-15 ENCOUNTER — APPOINTMENT (OUTPATIENT)
Dept: ORTHOPEDIC SURGERY | Facility: CLINIC | Age: 80
End: 2025-09-15
Payer: MEDICARE

## 2025-10-20 ENCOUNTER — APPOINTMENT (OUTPATIENT)
Dept: RHEUMATOLOGY | Facility: CLINIC | Age: 80
End: 2025-10-20
Payer: MEDICARE

## 2025-11-20 ENCOUNTER — APPOINTMENT (OUTPATIENT)
Dept: RHEUMATOLOGY | Facility: CLINIC | Age: 80
End: 2025-11-20
Payer: MEDICARE